# Patient Record
Sex: FEMALE | Race: WHITE | Employment: UNEMPLOYED | URBAN - METROPOLITAN AREA
[De-identification: names, ages, dates, MRNs, and addresses within clinical notes are randomized per-mention and may not be internally consistent; named-entity substitution may affect disease eponyms.]

---

## 2020-11-25 ENCOUNTER — TELEPHONE (OUTPATIENT)
Dept: PHARMACY | Age: 50
End: 2020-11-25

## 2020-11-25 ENCOUNTER — OFFICE VISIT (OUTPATIENT)
Dept: PRIMARY CARE CLINIC | Age: 50
End: 2020-11-25
Payer: COMMERCIAL

## 2020-11-25 VITALS
WEIGHT: 219.4 LBS | SYSTOLIC BLOOD PRESSURE: 106 MMHG | DIASTOLIC BLOOD PRESSURE: 64 MMHG | BODY MASS INDEX: 33.25 KG/M2 | HEIGHT: 68 IN | OXYGEN SATURATION: 99 % | HEART RATE: 85 BPM

## 2020-11-25 PROBLEM — Z86.718 HX OF DEEP VENOUS THROMBOSIS: Status: ACTIVE | Noted: 2020-11-25

## 2020-11-25 PROBLEM — R73.03 PREDIABETES: Status: ACTIVE | Noted: 2020-11-25

## 2020-11-25 PROBLEM — Z95.828 HISTORY OF AORTO-FEMORAL BYPASS: Status: ACTIVE | Noted: 2020-11-25

## 2020-11-25 PROBLEM — I50.9 CHRONIC CONGESTIVE HEART FAILURE (HCC): Status: ACTIVE | Noted: 2020-11-25

## 2020-11-25 PROBLEM — K21.9 GASTROESOPHAGEAL REFLUX DISEASE: Status: ACTIVE | Noted: 2020-11-25

## 2020-11-25 PROBLEM — I25.10 CORONARY ARTERY DISEASE INVOLVING NATIVE CORONARY ARTERY OF NATIVE HEART WITHOUT ANGINA PECTORIS: Status: ACTIVE | Noted: 2020-11-25

## 2020-11-25 PROBLEM — Z95.1 S/P CABG (CORONARY ARTERY BYPASS GRAFT): Status: ACTIVE | Noted: 2020-11-25

## 2020-11-25 PROBLEM — I10 ESSENTIAL HYPERTENSION: Status: ACTIVE | Noted: 2020-11-25

## 2020-11-25 PROCEDURE — 1036F TOBACCO NON-USER: CPT | Performed by: INTERNAL MEDICINE

## 2020-11-25 PROCEDURE — G8427 DOCREV CUR MEDS BY ELIG CLIN: HCPCS | Performed by: INTERNAL MEDICINE

## 2020-11-25 PROCEDURE — G8417 CALC BMI ABV UP PARAM F/U: HCPCS | Performed by: INTERNAL MEDICINE

## 2020-11-25 PROCEDURE — 99204 OFFICE O/P NEW MOD 45 MIN: CPT | Performed by: INTERNAL MEDICINE

## 2020-11-25 PROCEDURE — G8484 FLU IMMUNIZE NO ADMIN: HCPCS | Performed by: INTERNAL MEDICINE

## 2020-11-25 RX ORDER — WARFARIN SODIUM 5 MG/1
5 TABLET ORAL
COMMUNITY
End: 2020-11-25 | Stop reason: SDUPTHER

## 2020-11-25 RX ORDER — METOPROLOL SUCCINATE 25 MG/1
50 TABLET, EXTENDED RELEASE ORAL 2 TIMES DAILY
COMMUNITY
End: 2022-05-03 | Stop reason: SDUPTHER

## 2020-11-25 RX ORDER — POTASSIUM CHLORIDE 1.5 G/1.77G
20 POWDER, FOR SOLUTION ORAL DAILY
COMMUNITY
End: 2022-05-03 | Stop reason: SDUPTHER

## 2020-11-25 RX ORDER — WARFARIN SODIUM 5 MG/1
5 TABLET ORAL DAILY
Qty: 90 TABLET | Refills: 1 | Status: ON HOLD | OUTPATIENT
Start: 2020-11-25 | End: 2021-05-04 | Stop reason: HOSPADM

## 2020-11-25 RX ORDER — CICLOPIROX 7.7 MG/G
GEL TOPICAL
Qty: 1 TUBE | Refills: 1 | Status: SHIPPED | OUTPATIENT
Start: 2020-11-25 | End: 2021-05-03 | Stop reason: ALTCHOICE

## 2020-11-25 RX ORDER — FAMOTIDINE 20 MG/1
20 TABLET, FILM COATED ORAL 2 TIMES DAILY
COMMUNITY
End: 2021-05-03

## 2020-11-25 RX ORDER — LISINOPRIL 10 MG/1
10 TABLET ORAL DAILY
COMMUNITY
End: 2021-02-25 | Stop reason: ALTCHOICE

## 2020-11-25 RX ORDER — MEDROXYPROGESTERONE ACETATE 2.5 MG/1
5 TABLET ORAL DAILY
Status: ON HOLD | COMMUNITY
End: 2021-05-04

## 2020-11-25 RX ORDER — FUROSEMIDE 40 MG/1
40 TABLET ORAL DAILY
COMMUNITY
End: 2022-05-03 | Stop reason: SDUPTHER

## 2020-11-25 RX ORDER — ATORVASTATIN CALCIUM 40 MG/1
40 TABLET, FILM COATED ORAL DAILY
COMMUNITY
End: 2022-05-03 | Stop reason: SDUPTHER

## 2020-11-25 ASSESSMENT — PATIENT HEALTH QUESTIONNAIRE - PHQ9
SUM OF ALL RESPONSES TO PHQ QUESTIONS 1-9: 0
2. FEELING DOWN, DEPRESSED OR HOPELESS: 0
SUM OF ALL RESPONSES TO PHQ QUESTIONS 1-9: 0
SUM OF ALL RESPONSES TO PHQ QUESTIONS 1-9: 0
1. LITTLE INTEREST OR PLEASURE IN DOING THINGS: 0
SUM OF ALL RESPONSES TO PHQ9 QUESTIONS 1 & 2: 0

## 2020-11-25 NOTE — TELEPHONE ENCOUNTER
New Referral  Has been on warfarin since 2005  Has 2.5 mg and 1 mg tablets  Takes 5.5 mg daily  Will bring ID, Insurance card and medication list

## 2020-11-30 ENCOUNTER — TELEPHONE (OUTPATIENT)
Dept: PRIMARY CARE CLINIC | Age: 50
End: 2020-11-30

## 2020-11-30 NOTE — TELEPHONE ENCOUNTER
Patient states the handicap placard that was made for her does not have an expiration date so the [facility] would not accept it.  Please place new order for patient and call her when ready, thank you

## 2020-12-01 ENCOUNTER — TELEPHONE (OUTPATIENT)
Dept: PHARMACY | Age: 50
End: 2020-12-01

## 2020-12-01 NOTE — TELEPHONE ENCOUNTER
Spoke with patient for COVID 19 screening secondary to upcoming appointment tomorrow . At this time patient denies symptoms, recent (previous 14 days) positive covid test, recent travel and exposure. Patient will report to [facility] at scheduled time. Patient educated to call physician or go to ER with respiratory symptoms or fever and to not present to appointment if symptomatic. Will coordinate for next appointment accordingly. Reminded patient to bring insurance card, photo ID, and all current medication bottles for initial visit with us.     Camille Snider, PharmD, 12/1/2020 1:37 PM

## 2020-12-02 ENCOUNTER — HOSPITAL ENCOUNTER (OUTPATIENT)
Dept: PHARMACY | Age: 50
Setting detail: THERAPIES SERIES
Discharge: HOME | End: 2020-12-02
Payer: COMMERCIAL

## 2020-12-02 ENCOUNTER — TELEPHONE (OUTPATIENT)
Dept: PHARMACY | Age: 50
End: 2020-12-02

## 2020-12-02 VITALS — TEMPERATURE: 97.8 F

## 2020-12-02 LAB
INR BLD: 2.7
PROTIME: 32 SECONDS

## 2020-12-02 PROCEDURE — 99212 OFFICE O/P EST SF 10 MIN: CPT

## 2020-12-02 PROCEDURE — 85610 PROTHROMBIN TIME: CPT

## 2020-12-02 RX ORDER — WARFARIN SODIUM 1 MG/1
TABLET ORAL
Qty: 45 TABLET | Refills: 1 | Status: SHIPPED | OUTPATIENT
Start: 2020-12-02 | End: 2022-01-31

## 2020-12-02 NOTE — PROGRESS NOTES
Spoke with Rosita Chan at [address] Patient information given. They will contact patient to set up visit with their clinic. Progress note and referral faxed to [facility].    Jerilyn Maya, Pharm D, [facility]  [facility]  12/2/2020 12:25 PM

## 2020-12-02 NOTE — PROGRESS NOTES
First visit to [facility] Office. Education provided on indication and mechanism of warfarin, compliance, appropriate follow-up & monitoring, dietary and medication interactions, potential thromboembolic & bleeding complications, when to seek medical care, and office policy. Has been on warfarin since 2005 for dvt. Has been taking warfarin 5.5 mg (2.5 mg tablet with 3 of the 1 mg tablets) for at least 2 weeks. Unsure of dosing prior to that as warfarin was provided to her in skilled nursing. Patient also has warfarin 5 mg tablets at home she just picked up from [facility] after appointment with PCP. Patient would prefer to take a 5 mg tablet and 1/2 of the 1 mg tablet. Last INR was 11/16, but unsure of results. Patient is currently living with her elderly mother who drives her to all appointments. Patient is working on getting back on her feet and getting used to the [facility] area. Patient would like to go to the Coumadin Clinic in [facility] due to distance mother is driving her to appointments. Patient provided with a map of the [facility]    Patient states compliant all of the time with regimen. No bleeding or thromboembolic side effects noted. No significant med or dietary changes. Med list reviewed, no changes. Does eat salads, but uses regular head lettuce and understands to stay consistent. No significant recent illness or disease state changes. PT/INR done in office per protocol. INR was therapeutic at 2.7 (goal 2-3). Warfarin regimen will be continued at current dose 5.5 mg daily (1 of the 5 mg tablets with 1/2 of the 1 mg tablet). Will retest in 2 weeks. Patient understands dosing directions and information discussed. Dosing schedule and follow up appointment given to patient. Progress note routed to referring physicians office.     CLINICAL PHARMACY CONSULT: MED RECONCILIATION/REVIEW ADDENDUM    For Pharmacy Admin Tracking Only    PHSO: No  Total # of Interventions Recommended: 1  - Refills Provided #: 1  - Maintenance Safety Lab Monitoring #: 1  Total Interventions Accepted: 1  Time Spent (min): [address], LarryD

## 2020-12-02 NOTE — TELEPHONE ENCOUNTER
Refill for warfarin 1 mg tablet sent to [facility].    Larry Robb D, [facility]  [facility]  12/2/2020 10:28 AM

## 2020-12-05 ASSESSMENT — ENCOUNTER SYMPTOMS
CONSTIPATION: 0
DIARRHEA: 0
BACK PAIN: 0
SINUS PRESSURE: 0
SHORTNESS OF BREATH: 0
VOMITING: 0
WHEEZING: 0
NAUSEA: 0
SINUS PAIN: 0
ABDOMINAL DISTENTION: 0
ABDOMINAL PAIN: 0
COUGH: 0

## 2020-12-05 NOTE — PROGRESS NOTES
[facility] PRIMARY CARE  [facility] [address] [facility] [address]  [address] [address]  Dept: [phone]  Dept Fax: [phone]    Michael Bhandari is a 53 y.o. female who presents today for her medical conditions/complaints as noted below. Chief Complaint   Patient presents with   [facility] Patient     Establish Care. Referral to Cardiology. HPI:     This is a 41-year-old female who is here to establish care. She has past medical history of, coronary disease, history of DVT, essential hypertension, prediabetes, history of aortofemoral bypass, GERD. She is currently on Coumadin for history of DVT and she needs refill on Coumadin and referral to cardiology. She is currently on Lasix, lisinopril, Lipitor, potassium, Toprol. She is also on Provera. No other complaints or concerns.       No results found for: LABA1C          ( goal A1C is < 7)   No results found for: LABMICR  No results found for: LDLCHOLESTEROL, LDLCALC    (goal LDL is <100)   No results found for: AST, ALT, BUN  BP Readings from Last 3 Encounters:   20 106/64          (goal 120/80)    Past Medical History:   Diagnosis Date    CAD (coronary artery disease)     GERD (gastroesophageal reflux disease)     Hyperlipidemia     Hypertension     Prediabetes       Past Surgical History:   Procedure Laterality Date    CORONARY ARTERY BYPASS GRAFT      EYE SURGERY      Eye Socket    SHOULDER SURGERY Right     rods and pins placed    TONSILLECTOMY         Family History   Problem Relation Age of Onset    Heart Disease Father     Colon Cancer Brother        Social History     Tobacco Use    Smoking status: Former Smoker     Packs/day: 1.00     Years: 25.00     Pack years: 25.00     Types: Cigarettes     Last attempt to quit: 2007     Years since quittin.9    Smokeless tobacco: Never Used   Substance Use Topics    Alcohol use: Not Currently      Current Outpatient Medications Medication Sig Dispense Refill    metoprolol succinate (TOPROL XL) 25 MG extended release tablet Take 75 mg by mouth 2 times daily      medroxyPROGESTERone (PROVERA) 2.5 MG tablet Take 5 mg by mouth daily      famotidine (PEPCID) 20 MG tablet Take 20 mg by mouth 2 times daily      furosemide (LASIX) 40 MG tablet Take 40 mg by mouth daily      lisinopril (PRINIVIL;ZESTRIL) 10 MG tablet Take 10 mg by mouth daily      atorvastatin (LIPITOR) 40 MG tablet Take 40 mg by mouth daily      potassium chloride (KLOR-CON) 20 MEQ packet Take 20 mEq by mouth daily      warfarin (COUMADIN) 5 MG tablet Take 1 tablet by mouth daily Indications: Taking 5.5 Mg DAILY 90 tablet 1    Ciclopirox (LOPROX) 0.77 % gel Apply to affected areas twice daily (Patient not taking: Reported on 12/2/2020) 1 Tube 1    warfarin (COUMADIN) 1 MG tablet Take 5.5 mg (1 of the 5 mg tablet + 1/2 of the 1 mg tablet) daily or as directed by Coumadin Clinic. 45 tablet 1    Handicap Placard MISC by Does not apply route For 5 years 1 each 0     No current facility-administered medications for this visit. No Known Allergies    Health Maintenance   Topic Date Due    Potassium monitoring  1971    Creatinine monitoring  1971    A1C test (Diabetic or Prediabetic)  07/03/1981    Lipid screen  07/03/1981    HIV screen  07/03/1986    Cervical cancer screen  07/03/1992    DTaP/Tdap/Td vaccine (1 - Tdap) 11/25/2021 (Originally 7/3/1990)    Flu vaccine  Completed    Hepatitis A vaccine  Aged Out    Hepatitis B vaccine  Aged Out    Hib vaccine  Aged Out    Meningococcal (ACWY) vaccine  Aged Out    Pneumococcal 0-64 years Vaccine  Aged Out       Subjective:      Review of Systems   Constitutional: Negative for activity change, appetite change, chills, fatigue and fever. HENT: Negative for congestion, ear pain, hearing loss, nosebleeds, sinus pressure, sinus pain and sneezing. Eyes: Negative for visual disturbance.    Respiratory: Negative for cough, shortness of breath and wheezing. Cardiovascular: Negative for chest pain and palpitations. Gastrointestinal: Negative for abdominal distention, abdominal pain, constipation, diarrhea, nausea and vomiting. Endocrine: Negative for cold intolerance, heat intolerance, polydipsia, polyphagia and polyuria. Genitourinary: Negative for difficulty urinating, menstrual problem, vaginal bleeding and vaginal discharge. Musculoskeletal: Negative for back pain and joint swelling. Skin: Negative for rash. Neurological: Negative for numbness and headaches. Psychiatric/Behavioral: Negative for sleep disturbance. The patient is not nervous/anxious. All other systems reviewed and are negative. Objective:     Physical Exam  Vitals signs and nursing note reviewed. Constitutional:       General: She is not in acute distress. Appearance: She is well-developed. She is not diaphoretic. HENT:      Head: Normocephalic and atraumatic. Right Ear: Hearing normal.      Left Ear: Hearing normal.      Mouth/Throat:      Pharynx: Uvula midline. Eyes:      General: No scleral icterus. Conjunctiva/sclera: Conjunctivae normal.      Pupils: Pupils are equal, round, and reactive to light. Neck:      Musculoskeletal: Full passive range of motion without pain, normal range of motion and neck supple. Thyroid: No thyroid mass or thyromegaly. Vascular: No JVD. Trachea: Phonation normal.   Cardiovascular:      Rate and Rhythm: Normal rate and regular rhythm. Pulses: No decreased pulses. Carotid pulses are 2+ on the right side and 2+ on the left side. Radial pulses are 2+ on the right side and 2+ on the left side. Heart sounds: Normal heart sounds. No murmur. Pulmonary:      Effort: Pulmonary effort is normal. No accessory muscle usage or respiratory distress. Breath sounds: Normal breath sounds. No wheezing or rales.    Abdominal:      General: Bowel sounds are normal. There is no distension. Palpations: Abdomen is soft. Tenderness: There is no abdominal tenderness. Musculoskeletal: Normal range of motion. General: No deformity. Lymphadenopathy:      Cervical: No cervical adenopathy. Skin:     General: Skin is warm. Capillary Refill: Capillary refill takes less than 2 seconds. Neurological:      Mental Status: She is alert and oriented to person, place, and time. Deep Tendon Reflexes: Reflexes normal.   Psychiatric:         Behavior: Behavior normal.       /64   Pulse 85   Ht 5' 8\" (1.727 m)   Wt 219 lb 6.4 oz (99.5 kg)   SpO2 99% Comment: 2L of O2  BMI 33.36 kg/m²     Assessment:          1. Encounter to establish care with new doctor      2. Chronic congestive heart failure, unspecified heart failure type ([facility] [address]    - Handicap placard  - Armando Chery DO, Cardiology, [place]    3. Gastroesophageal reflux disease, unspecified whether esophagitis present  Pepcid    4. Coronary artery disease involving native coronary artery of native heart without angina pectoris  Toprol, Lipitor    5. Hx of deep venous thrombosis    - warfarin (COUMADIN) 5 MG tablet; Take 1 tablet by mouth daily Indications: Taking 5.5 Mg DAILY  Dispense: 90 tablet; Refill: 1  - [facility]) Medication Mgmt (Anticoagulation) - [address]    6. Essential hypertension  Well-controlled on lisinopril, Toprol    7. Prediabetes  Diet and exercise advised    8. Ringworm of body    - Ciclopirox (LOPROX) 0.77 % gel; Apply to affected areas twice daily (Patient not taking: Reported on 12/2/2020)  Dispense: 1 Tube; Refill: 1    9. History of aorto-femoral bypass  Follow-up with cardiology            Diagnosis Orders   1. Encounter to establish care with new doctor     2. Chronic congestive heart failure, unspecified [facility] failure type ([facility]  Handicap Armando Miranda DO, Cardiology, [place]   3.  Gastroesophageal reflux disease, unspecified whether esophagitis present     4. Coronary artery disease involving native coronary artery of native heart without angina pectoris     5. Hx of deep venous thrombosis  warfarin (COUMADIN) 5 MG tablet    [facility] Medication Mgmt (Anticoagulation) - [facility]   6. Essential hypertension     7. Prediabetes     8. Ringworm of body  Ciclopirox (LOPROX) 0.77 % gel   9. History of aorto-femoral bypass             Plan:      Return in about 3 months (around 2/25/2021) for Routine follow-up. Orders Placed This Encounter   Procedures    Handicap placard    EZEKIEL - Nikhil, [address], DO, Cardiology, [facility]     Referral Priority:   Routine     Referral Type:   Eval and Treat     Referral Reason:   Specialty Services Required     Referred to Provider:   Mitch Simon DO     Requested Specialty:   Cardiology     Number of Visits Requested:   [id number] Located within [facility] Medication Mgmt (Anticoagulation) - [facility]     Referral Priority:   Routine     Referral Type:   Eval and Treat     Referral Reason:   Specialty Services Required     Requested Specialty:   Pharmacist     Number of Visits Requested:   1     Expiration Date:   11/25/2022     Orders Placed This Encounter   Medications    warfarin (COUMADIN) 5 MG tablet     Sig: Take 1 tablet by mouth daily Indications: Taking 5.5 Mg DAILY     Dispense:  90 tablet     Refill:  1    Ciclopirox (LOPROX) 0.77 % gel     Sig: Apply to affected areas twice daily     Dispense:  1 Tube     Refill:  1         Patient given educational materials - see patient instructions. Discussed use, benefit, and side effects of prescribedmedications. All patient questions answered. Pt voiced understanding. Reviewed health maintenance. Instructed to continue current medications, diet and exercise. Patient agreed with treatment plan. Follow up as directed. I spent a total of 45 minutes face to face with this patient. Over 50% of that time was spent on counseling and care coordination.   Please see

## 2020-12-07 ENCOUNTER — TELEPHONE (OUTPATIENT)
Dept: PHARMACY | Age: 50
End: 2020-12-07

## 2020-12-16 ENCOUNTER — HOSPITAL ENCOUNTER (OUTPATIENT)
Dept: PHARMACY | Age: 50
Setting detail: THERAPIES SERIES
Discharge: HOME | End: 2020-12-16
Payer: COMMERCIAL

## 2020-12-16 LAB
INR BLD: 3.9
PROTIME: 46.4 SECONDS

## 2020-12-16 PROCEDURE — 99212 OFFICE O/P EST SF 10 MIN: CPT

## 2020-12-16 PROCEDURE — 85610 PROTHROMBIN TIME: CPT

## 2020-12-16 NOTE — PROGRESS NOTES
Medication Management Service, Warfarin Management  [facility] [phone]  Visit Date: 12/16/2020   Subjective:   Joanna Oneal is a 53 y.o. female who presents to clinic today for anticoagulation monitoring and adjustment. Patient seen in clinic for warfarin management due to  Indication:   DVT. INR goal: of 2.0-3.0. Duration of therapy: indefinite. Assessment and PLAN   PT/INR done in office per protocol. INR today is 3.9, supratherapeutic. Plan:  First time seeing the patient here at the [facility]. Pt reports that she is typically stable on this dose of 5.5 mg daily. Today, she presents supratherapeutic. After discussion, the cause could not be identified. Will hold today's dose then will continue current regimen of warfarin 5.5 mg daily. Using warfarin 1 mg and 5 mg tablets. Recheck INR in one week. Patient seen at [address] [place] [address] Dr. COVID screening complete and temperature recorded - afebrile. Patient verbalized understanding of dosing directions and information discussed. Dosing schedule given to patient. Progress note sent to referring office. Patient acknowledges working in consult agreement with pharmacist as referred by his/her physician.       Electronically signed by Meryle Locks, [address] [facility] on 12/16/20 at 9:00 AM EST    CLINICAL PHARMACY CONSULT: MED RECONCILIATION/REVIEW [address]: No  Total # of Interventions Recommended: 1  - Decreased Dose #: 1  - Maintenance Safety Lab Monitoring #: 1  Total Interventions Accepted: 1  Time Spent (min): [address], [address]

## 2020-12-23 ENCOUNTER — HOSPITAL ENCOUNTER (OUTPATIENT)
Dept: PHARMACY | Age: 50
Setting detail: THERAPIES SERIES
Discharge: HOME | End: 2020-12-23
Payer: COMMERCIAL

## 2020-12-23 LAB
INR BLD: 3.8
PROTIME: 45.9 SECONDS

## 2020-12-23 PROCEDURE — 85610 PROTHROMBIN TIME: CPT

## 2020-12-23 PROCEDURE — 99212 OFFICE O/P EST SF 10 MIN: CPT

## 2020-12-23 NOTE — PROGRESS NOTES
Medication Management Service, Warfarin Management  [facility] [phone]  Visit Date: 12/23/2020   Subjective:   Chaim Robledo is a 53 y.o. female who presents to clinic today for anticoagulation monitoring and adjustment. Patient seen in clinic for warfarin management due to  Indication:   DVT. INR goal: of 2.0-3.0. Duration of therapy: indefinite. Assessment and PLAN   PT/INR done in office per protocol. INR today is 3.8, supratherapeutic. Plan: The patient remains supratherapeutic. Will hold today's 5.5 mg dose then will initiate a new decreased regimen of warfarin 5 mg daily (9.1% decrease). Using warfarin 5 mg tablets. Recheck INR in one week. Patient seen at [address] [place] [address] Dr. COVID screening complete and temperature recorded - afebrile. Patient verbalized understanding of dosing directions and information discussed. Dosing schedule given to patient. Progress note sent to referring office. Patient acknowledges working in consult agreement with pharmacist as referred by his/her physician.       Electronically signed by Lula Petersen, [address] [place] [address] on 12/23/20 at 9:10 AM EST    CLINICAL PHARMACY CONSULT: MED RECONCILIATION/REVIEW Charles  22. Tracking Only    PHSO: No  Total # of Interventions Recommended: 2  - Decreased Dose #: 2  (hold x 1 then decrease regimen)  - Maintenance Safety Lab Monitoring #: 1  Total Interventions Accepted: 2  Time Spent (min): [facility], [address] [place] [facility]

## 2020-12-30 ENCOUNTER — HOSPITAL ENCOUNTER (OUTPATIENT)
Dept: PHARMACY | Age: 50
Setting detail: THERAPIES SERIES
Discharge: HOME | End: 2020-12-30
Payer: COMMERCIAL

## 2020-12-30 LAB
INR BLD: 3.5
PROTIME: 42.5 SECONDS

## 2020-12-30 PROCEDURE — 99212 OFFICE O/P EST SF 10 MIN: CPT

## 2020-12-30 PROCEDURE — 85610 PROTHROMBIN TIME: CPT

## 2020-12-30 NOTE — PROGRESS NOTES
Medication Management Service, Warfarin Management  [facility] Medication Management, [phone]  Visit Date: 12/30/2020   Subjective:   Ronna Cortes is a 53 y.o. female who presents to clinic today for anticoagulation monitoring and adjustment. Patient was referred for warfarin management due to  Indication:   DVT. INR goal: of 2.0-3.0. Duration of therapy: other: undeterminded. Patient reports the following:   Adherent with regimen:  Yes  Missed or extra doses:  None   Bleeding or thromboembolic side effects:  None  Significant medication, dietary, alcohol, or tobacco changes:  None  Significant recent illness, disease state changes, or hospitalization:  None  Upcoming surgeries or procedures:  ECHO on 1/8/21. Assessment and PLAN   PT/INR done in office per protocol. INR today is 3.5, supratherapeutic. The COVID screening was done at the appointment today and the patient's temperature was 97.6 F. Plan:  Instructed the patient to start a decreased warfarin regimen of 2.5 mg Wed and 5 mg on all other days. Using warfarin 1 mg and 5 mg tablets. Recheck INR in 2 week(s). Patient verbalized understanding of dosing directions and information discussed. Dosing schedule given to patient. Progress note sent to referring office. Patient acknowledges working in consult agreement with pharmacist as referred by his/her physician.       Electronically signed by Anna Cox, [facility] on 12/30/20 at 9:19 AM EST    CLINICAL PHARMACY CONSULT: MED RECONCILIATION/REVIEW Charles  22. Tracking Only    PHSO: No  Total # of Interventions Recommended: 1  - Decreased Dose #: 1  - Maintenance Safety Lab Monitoring #: 1  Total Interventions Accepted: 1  Time Spent (min): [facility], PharmD  [address]

## 2021-01-13 ENCOUNTER — HOSPITAL ENCOUNTER (OUTPATIENT)
Dept: PHARMACY | Age: 51
Setting detail: THERAPIES SERIES
Discharge: HOME | End: 2021-01-13
Payer: COMMERCIAL

## 2021-01-13 LAB
INR BLD: 3.8
PROTIME: 45.2 SECONDS

## 2021-01-13 PROCEDURE — 85610 PROTHROMBIN TIME: CPT

## 2021-01-13 PROCEDURE — 99212 OFFICE O/P EST SF 10 MIN: CPT

## 2021-01-13 NOTE — PROGRESS NOTES
Medication Management Service, Warfarin Management  [facility] [phone]  Visit Date: 1/13/2021   Subjective:   Magali Sanders is a 53 y.o. female who presents to clinic today for anticoagulation monitoring and adjustment. Patient seen in clinic for warfarin management due to  Indication:   DVT. INR goal: of 2.0-3.0. Duration of therapy: indefinite. Assessment and PLAN   PT/INR done in office per protocol. INR today is 3.8, supratherapeutic. Plan:  The patient remains supratherapeutic. Will hold today's 2.5 mg dose then further reduce the patient's warfarin maintenance regimen to 2.5 mg on WF and 5 mg all other days of the week. Using warfarin 5 mg tablets. Recheck INR in ~two weeks. Patient seen at [address] [place] [address] Dr. COVID screening complete and temperature recorded - afebrile. Patient verbalized understanding of dosing directions and information discussed. Dosing schedule given to patient. Progress note sent to referring office. Patient acknowledges working in consult agreement with pharmacist as referred by his/her physician.       Electronically signed by Benigno Dyson, [address] [place] [address] on 1/13/21 at 10:09 AM EST    CLINICAL PHARMACY CONSULT: MED RECONCILIATION/REVIEW Charles  22. Tracking Only    PHSO: No  Total # of Interventions Recommended: 2  - Decreased Dose #: 2 - hold x 1; reduce maintenance regimen  - Maintenance Safety Lab Monitoring #: 1  Total Interventions Accepted: 2  Time Spent (min): [address], [address] [place] [address]

## 2021-01-25 ENCOUNTER — HOSPITAL ENCOUNTER (OUTPATIENT)
Dept: PHARMACY | Age: 51
Setting detail: THERAPIES SERIES
Discharge: HOME | End: 2021-01-25
Payer: COMMERCIAL

## 2021-01-25 DIAGNOSIS — Z86.718 HX OF DEEP VENOUS THROMBOSIS: ICD-10-CM

## 2021-01-25 LAB
INR BLD: 2.9
PROTIME: 34.4 SECONDS

## 2021-01-25 PROCEDURE — 85610 PROTHROMBIN TIME: CPT

## 2021-01-25 PROCEDURE — 99211 OFF/OP EST MAY X REQ PHY/QHP: CPT

## 2021-02-08 ENCOUNTER — HOSPITAL ENCOUNTER (OUTPATIENT)
Dept: PHARMACY | Age: 51
Setting detail: THERAPIES SERIES
Discharge: HOME | End: 2021-02-08
Payer: COMMERCIAL

## 2021-02-08 ENCOUNTER — HOSPITAL ENCOUNTER (OUTPATIENT)
Age: 51
Discharge: HOME | End: 2021-02-08
Payer: COMMERCIAL

## 2021-02-08 DIAGNOSIS — Z86.718 HX OF DEEP VENOUS THROMBOSIS: ICD-10-CM

## 2021-02-08 LAB
ANION GAP SERPL CALCULATED.3IONS-SCNC: 7 MMOL/L (ref 9–17)
BUN BLDV-MCNC: 21 MG/DL (ref 6–20)
BUN/CREAT BLD: ABNORMAL (ref 9–20)
CALCIUM SERPL-MCNC: 9.3 MG/DL (ref 8.6–10.4)
CHLORIDE BLD-SCNC: 105 MMOL/L (ref 98–107)
CO2: 29 MMOL/L (ref 20–31)
CREAT SERPL-MCNC: 0.92 MG/DL (ref 0.5–0.9)
GFR AFRICAN AMERICAN: >60 ML/MIN
GFR NON-AFRICAN AMERICAN: >60 ML/MIN
GFR SERPL CREATININE-BSD FRML MDRD: ABNORMAL ML/MIN/{1.73_M2}
GFR SERPL CREATININE-BSD FRML MDRD: ABNORMAL ML/MIN/{1.73_M2}
GLUCOSE BLD-MCNC: 133 MG/DL (ref 70–99)
INR BLD: 2.2
POTASSIUM SERPL-SCNC: 5 MMOL/L (ref 3.7–5.3)
PROTIME: 26.8 SECONDS
SODIUM BLD-SCNC: 141 MMOL/L (ref 135–144)

## 2021-02-08 PROCEDURE — 85610 PROTHROMBIN TIME: CPT

## 2021-02-08 PROCEDURE — 80048 BASIC METABOLIC PNL TOTAL CA: CPT

## 2021-02-08 PROCEDURE — 36415 COLL VENOUS BLD VENIPUNCTURE: CPT

## 2021-02-08 PROCEDURE — 99211 OFF/OP EST MAY X REQ PHY/QHP: CPT

## 2021-02-08 NOTE — PROGRESS NOTES
Medication Management Service, Warfarin Management  [facility] Medication Management, [phone]  Visit Date: 2/8/2021   Subjective:   Marilu Mancuso is a 53 y.o. female who presents to clinic today for anticoagulation monitoring and adjustment. Patient was referred for warfarin management due to  Indication:   DVT. INR goal: of 2.0-3.0. Duration of therapy: other: undetermined. Patient reports the following:   Adherent with regimen:  Yes  Missed or extra doses:  None   Bleeding or thromboembolic side effects:  None  Significant medication, dietary, alcohol, or tobacco changes: The patient has been taking acetaminophen lately, 2 tablets BID, some days she takes 6 tablets daily. Significant recent illness, disease state changes, or hospitalization:  The patient threw out her back last week. Upcoming surgeries or procedures:  None           Assessment and PLAN   PT/INR done in office per protocol. INR today is 2.2, therapeutic. The COVID screening was done at the appointment today and the patient's temperature was 98.6 F. Plan:  Instructed the patient to continue the current warfarin regimen of 2.5 mg Wed, Fri and 5 mg on all other days. Using warfarin 1 mg and 5 mg tablets. Recheck INR in 4 week(s). Patient verbalized understanding of dosing directions and information discussed. Dosing schedule given to patient. Progress note sent to referring office. Patient acknowledges working in consult agreement with pharmacist as referred by his/her physician.       Electronically signed by Greg Beckett [facility] on 2/8/21 at 9:01 AM EST    CLINICAL PHARMACY CONSULT: MED RECONCILIATION/REVIEW Charles  22. Tracking Only    PHSO: No  Total # of Interventions Recommended: 0  - Maintenance Safety Lab Monitoring #: 1  Total Interventions Accepted: 0  Time Spent (min): 1060 [facility], PharmD  [address]

## 2021-02-25 ENCOUNTER — OFFICE VISIT (OUTPATIENT)
Dept: PRIMARY CARE CLINIC | Age: 51
End: 2021-02-25
Payer: COMMERCIAL

## 2021-02-25 VITALS
OXYGEN SATURATION: 99 % | SYSTOLIC BLOOD PRESSURE: 124 MMHG | WEIGHT: 239 LBS | RESPIRATION RATE: 16 BRPM | BODY MASS INDEX: 36.34 KG/M2 | DIASTOLIC BLOOD PRESSURE: 76 MMHG | HEART RATE: 104 BPM

## 2021-02-25 DIAGNOSIS — G89.29 CHRONIC BILATERAL LOW BACK PAIN WITHOUT SCIATICA: ICD-10-CM

## 2021-02-25 DIAGNOSIS — R73.03 PREDIABETES: ICD-10-CM

## 2021-02-25 DIAGNOSIS — I10 ESSENTIAL HYPERTENSION: Primary | ICD-10-CM

## 2021-02-25 DIAGNOSIS — K21.9 GASTROESOPHAGEAL REFLUX DISEASE, UNSPECIFIED WHETHER ESOPHAGITIS PRESENT: ICD-10-CM

## 2021-02-25 DIAGNOSIS — M54.50 CHRONIC BILATERAL LOW BACK PAIN WITHOUT SCIATICA: ICD-10-CM

## 2021-02-25 DIAGNOSIS — Z95.1 S/P CABG (CORONARY ARTERY BYPASS GRAFT): ICD-10-CM

## 2021-02-25 PROCEDURE — 99214 OFFICE O/P EST MOD 30 MIN: CPT | Performed by: INTERNAL MEDICINE

## 2021-02-25 PROCEDURE — G8427 DOCREV CUR MEDS BY ELIG CLIN: HCPCS | Performed by: INTERNAL MEDICINE

## 2021-02-25 PROCEDURE — G8484 FLU IMMUNIZE NO ADMIN: HCPCS | Performed by: INTERNAL MEDICINE

## 2021-02-25 PROCEDURE — 1036F TOBACCO NON-USER: CPT | Performed by: INTERNAL MEDICINE

## 2021-02-25 PROCEDURE — G8417 CALC BMI ABV UP PARAM F/U: HCPCS | Performed by: INTERNAL MEDICINE

## 2021-02-25 RX ORDER — SPIRONOLACTONE 25 MG/1
25 TABLET ORAL DAILY
COMMUNITY
End: 2021-09-01

## 2021-02-25 RX ORDER — SACUBITRIL AND VALSARTAN 24; 26 MG/1; MG/1
1 TABLET, FILM COATED ORAL 2 TIMES DAILY
COMMUNITY

## 2021-02-25 ASSESSMENT — ENCOUNTER SYMPTOMS
ABDOMINAL DISTENTION: 0
CONSTIPATION: 0
WHEEZING: 0
VOMITING: 0
SINUS PRESSURE: 0
COUGH: 0
SINUS PAIN: 0
SHORTNESS OF BREATH: 0
DIARRHEA: 0
NAUSEA: 0
ABDOMINAL PAIN: 0
BACK PAIN: 1

## 2021-02-25 ASSESSMENT — PATIENT HEALTH QUESTIONNAIRE - PHQ9
SUM OF ALL RESPONSES TO PHQ QUESTIONS 1-9: 0
SUM OF ALL RESPONSES TO PHQ9 QUESTIONS 1 & 2: 0
1. LITTLE INTEREST OR PLEASURE IN DOING THINGS: 0

## 2021-02-25 NOTE — PROGRESS NOTES
[facility] PRIMARY CARE  [address] [facility] 15[address]  [address] [address]  Dept: [phone]  Dept Fax: [phone]    Edinson Powers is a 53 y.o. female who presents today for her medical conditions/complaints as noted below. Chief Complaint   Patient presents with    Other     Low back pain       HPI:     This is a 67-year-old female who is here for regular follow-up. Next she has past medical history of essential hypertension, prediabetes, GERD, cardiac disease status post CABG. Reviewed all the medications updated list.  She has been having lower back pain and she has not taken any medications for the same. She does not have any radiation to her bilateral lower extremities.   Blood pressures well controlled      No results found for: LABA1C          ( goal A1C is < 7)   No results found for: LABMICR  No results found for: LDLCHOLESTEROL, LDLCALC    (goal LDL is <100)   BUN (mg/dL)   Date Value   2021 21 (H)     BP Readings from Last 3 Encounters:   21 124/76   20 106/64          (goal 120/80)    Past Medical History:   Diagnosis Date    CAD (coronary artery disease)     GERD (gastroesophageal reflux disease)     Hyperlipidemia     Hypertension     Prediabetes       Past Surgical History:   Procedure Laterality Date    CORONARY ARTERY BYPASS GRAFT  2004    EYE SURGERY  1990    Eye Socket    SHOULDER SURGERY Right 1994    rods and pins placed    TONSILLECTOMY         Family History   Problem Relation Age of Onset    Heart Disease Father     Colon Cancer Brother        Social History     Tobacco Use    Smoking status: Former Smoker     Packs/day: 1.00     Years: 25.00     Pack years: 25.00     Types: Cigarettes     Quit date: 2007     Years since quittin.1    Smokeless tobacco: Never Used   Substance Use Topics    Alcohol use: Not Currently      Current Outpatient Medications   Medication Sig Dispense Refill  sacubitril-valsartan (ENTRESTO) 24-26 MG per tablet Take 1 tablet by mouth 2 times daily      spironolactone (ALDACTONE) 25 MG tablet Take 25 mg by mouth daily      warfarin (COUMADIN) 1 MG tablet Take 5.5 mg (1 of the 5 mg tablet + 1/2 of the 1 mg tablet) daily or as directed by Coumadin Clinic. 45 tablet 1    metoprolol succinate (TOPROL XL) 25 MG extended release tablet Take 75 mg by mouth 2 times daily      medroxyPROGESTERone (PROVERA) 2.5 MG tablet Take 5 mg by mouth daily      furosemide (LASIX) 40 MG tablet Take 40 mg by mouth daily      atorvastatin (LIPITOR) 40 MG tablet Take 40 mg by mouth daily      potassium chloride (KLOR-CON) 20 MEQ packet Take 20 mEq by mouth daily      warfarin (COUMADIN) 5 MG tablet Take 1 tablet by mouth daily Indications: Taking 5.5 Mg DAILY 90 tablet 1    Ciclopirox (LOPROX) 0.77 % gel Apply to affected areas twice daily 1 Tube 1    Handicap Placard MISC by Does not apply route For 5 years 1 each 0    famotidine (PEPCID) 20 MG tablet Take 20 mg by mouth 2 times daily       No current facility-administered medications for this visit. No Known Allergies    Health Maintenance   Topic Date Due    Hepatitis C screen  1970    Pneumococcal 0-64 years Vaccine (1 of 1 - PPSV23) 07/03/1977    A1C test (Diabetic or Prediabetic)  07/03/1981    Lipid screen  07/03/1981    HIV screen  07/03/1986    Cervical cancer screen  07/03/1992    DTaP/Tdap/Td vaccine (1 - Tdap) 11/25/2021 (Originally 7/3/1990)    Potassium monitoring  02/08/2022    Creatinine monitoring  02/08/2022    Flu vaccine  Completed    Hepatitis A vaccine  Aged Out    Hepatitis B vaccine  Aged Out    Hib vaccine  Aged Out    Meningococcal (ACWY) vaccine  Aged Out       Subjective:      Review of Systems   Constitutional: Negative for activity change, appetite change, chills, fatigue and fever. Effort: Pulmonary effort is normal. No accessory muscle usage or respiratory distress. Breath sounds: Normal breath sounds. No wheezing or rales. Abdominal:      General: Bowel sounds are normal. There is no distension. Palpations: Abdomen is soft. Tenderness: There is no abdominal tenderness. Musculoskeletal: Normal range of motion. General: No deformity. Lymphadenopathy:      Cervical: No cervical adenopathy. Skin:     General: Skin is warm. Capillary Refill: Capillary refill takes less than 2 seconds. Findings: No rash. Neurological:      Mental Status: She is alert and oriented to person, place, and time. Deep Tendon Reflexes: Reflexes normal.   Psychiatric:         Behavior: Behavior normal.       /76   Pulse 104   Resp 16   Wt 239 lb (108.4 kg)   SpO2 99%   BMI 36.34 kg/m²     Assessment:          1. Essential hypertension  Well-controlled    2. Prediabetes      3. S/P CABG (coronary artery bypass graft)  On Entresto and metoprolol for congestive heart failure    4. Gastroesophageal reflux disease, unspecified whether esophagitis present  On Pepcid    5. Chronic bilateral low back pain without sciatica    - [facility]  [place]            Diagnosis Orders   1. Essential hypertension     2. Prediabetes     3. S/P CABG (coronary artery bypass graft)     4. Gastroesophageal reflux disease, unspecified whether esophagitis present     5.  Chronic bilateral low back pain without sciatica  [facility]  [place]           Plan:      Return in about 2 weeks (around 3/11/2021) for annual exam.    Orders Placed This Encounter   Procedures    [facility]     Referral Priority:   Routine     Referral Type:   Eval and Treat     Referral Reason:   Specialty Services Required     Requested Specialty:   Physical Therapy     Number of Visits Requested:   1 No orders of the defined types were placed in this encounter. Patient given educational materials - see patient instructions. Discussed use, benefit, and side effects of prescribedmedications. All patient questions answered. Pt voiced understanding. Reviewed health maintenance. Instructed to continue current medications, diet and exercise. Patient agreed with treatment plan. Follow up as directed. I spent a total of 25 minutes face to face with this patient. Over 50% of that time was spent on counseling and care coordination. Please see assessment and plan for details. Electronically signed by Alex Bowers MD on 2/25/2021 at 2:46 PM      Please note that this chart was generated using voice recognition Dragon dictation software. Although every effort was made to ensure the accuracy of this automatedtranscription, some errors in transcription may have occurred.

## 2021-02-26 ENCOUNTER — HOSPITAL ENCOUNTER (OUTPATIENT)
Dept: PHYSICAL THERAPY | Facility: CLINIC | Age: 51
Setting detail: THERAPIES SERIES
Discharge: HOME | End: 2021-02-26
Payer: COMMERCIAL

## 2021-02-26 PROCEDURE — 97162 PT EVAL MOD COMPLEX 30 MIN: CPT

## 2021-02-26 PROCEDURE — 97140 MANUAL THERAPY 1/> REGIONS: CPT

## 2021-02-26 PROCEDURE — 97161 PT EVAL LOW COMPLEX 20 MIN: CPT

## 2021-02-26 NOTE — CONSULTS
[] [facility]) [facility]STEP [facility] &  Therapy  [address]  [phone]  F: [phone] [] [address]  [address]   [address]  P: [phone]  F: [phone] [] [address] &  Therapy  [address]  P: [phone]  F: [phone] [] [address]  P: [phone]  F: [phone] [] [address]  [facility]   [address]   [place]: [phone]  F: [phone]      Physical Therapy Spine Evaluation    Date:  2021  Patient: Sheeba Soto  : 1970  MRN: [id number]  Physician: Dr. Echavarria Elder: [facility] Diagnosis: LBP   Rehab Codes: M54.5  Onset Date: 21  Next 's appt. : 3/11    Subjective:   CC: LBP (central mostly) but increased on R if she bends  HPI: approximately 1 month ago, she woke up and had increased LBP. Pain is still there, but not as severe. Walking increases her pain. Changed beds. PMHx: [] Unremarkable [] Diabetes [] HTN  [] Pacemaker   [] MI/Heart Problems [] Cancer [] Arthritis  [x] Other: arterial bypass.   15% ejection fraction so activity level is low, toes will turn purple, significant issues with blood clots            [x] Refer to full medical chart  In EPIC       Comorbidities:   [x] Obesity [] Dialysis  [] N/A   [x] Asthma/COPD [] Dementia [x] Other: CABG '03   [] Stroke [] Sleep apnea [] Other:   [x] Vascular disease [] Rheumatic disease [] Other:     Tests: [] X-Ray: [] MRI:  [] Other:no testing    Medications: [x] Refer to full medical record [] None [] Other:  Allergies:      [x] Refer to full medical record [] None [] Other:    Function:  Hand Dominance  [] Right  [] Left  Patient lives with: Her mom   In what type of home []  One story   [] Two story   [] Split level   Number of stairs to enter    With handrail on the []  Right to enter   [] Left to enter   Bathroom has a []  Tub only  [] Tub/shower combo   [] Walk in shower    []  Grab bars   Washing machine is on []  Main level   [] Second level   [] Basement   Employer    Job Status []  Normal duty   [] Light duty   [] Off due to condition    []  Retired   [x] Not employed   [] Disability  [] Other:  []  Return to work:    Work activities/duties Daily light chores, quilting, trying to start quilting       ADL/IADL Previous level of function Current level of function Who currently assists the patient with task   Bathing  [] Independent  [] Assist [x] Independent  [] Assist    Dress/grooming [] Independent  [] Assist [x] Independent  [] Assist    Transfer/mobility [] Independent  [] Assist [x] Independent  [] Assist    Feeding [] Independent  [] Assist [x] Independent  [] Assist    Toileting [] Independent  [] Assist [x] Independent  [] Assist    Driving [] Independent  [] Assist [x] Independent  [] Assist    Housekeeping [] Independent  [] Assist [x] Independent  [] Assist    Grocery shop/meal prep [] Independent  [] Assist [x] Independent  [] Assist      Gait Prior level of function Current level of function    [] Independent  [] Assist [x] Independent  [] Assist   Device: [] Independent [x] Independent    [] Straight Cane [] Quad cane [] Straight Cane [] Quad cane    [] Standard walker [] Rolling walker   [] 4 wheeled walker [] Standard walker [] Rolling walker   [] 4 wheeled walker    [] Wheelchair [] Wheelchair     Pain:  [x] Yes  [] No Location: LB Pain Rating: (0-10 scale) 1/10 now; worst 6/10 this morning   Pain altered Tx:  [] Yes  [x] No  Action:    Symptoms:  [x] Improving [] Worsening [] Same  Better:  [] AM    [] PM    [] Sit    [] Rise/Sit    []Stand    [] Walk    [] Lying    [] Other:  Worse: [] AM    [] PM    [] Sit    [] Rise/Sit    []Stand    [x] Walk    [] Lying    [x] Bend                      [] without cues. LTG: (to be met in 20treatments)  1. Pain 0/10 majority of the time, 2/10 at the worst  2. Modified Oswestry <10% interference as dictated by cardiologist   3. No pain in standing with ext, flexion and arielle SB    Patient goals:\"to get rid of the back pain\"    Rehab Potential:  [] Good  [x] Fair  [] Poor   Suggested Professional Referral:  [x] No  [] Yes:  Barriers to Goal Achievement:  [x] No  [] Yes:  Domestic Concerns:  [x] No  [] Yes:    Pt. Education:  [x] Plans/Goals, Risks/Benefits discussed  [] Home exercise program  Method of Education: [x] Verbal  [x] Demo  [] Written  Comprehension of Education:  [] Verbalizes understanding. [] Demonstrates understanding. [x] Needs Review. [] Demonstrates/verbalizes understanding of HEP/Ed previously given.     Treatment Plan:  [x] Therapeutic Exercise   71293  [] Iontophoresis: 4 mg/mL Dexamethasone Sodium Phosphate  mAmin  91432   [] Therapeutic Activity  63399 [] Vasopneumatic cold with compression  75667    [] Gait Training   78035 [] Ultrasound   47619   [] Neuromuscular Re-education  25021 [] Electrical Stimulation Unattended  64552   [x] Manual Therapy  11388 [] Electrical Stimulation Attended  71397   [x] Instruction in HEP  [] Lumbar/Cervical Traction  85389   [x] Aquatic Therapy-pending cardiologist approval   71953 [] Cold/hotpack    [] Massage   60560      [] Dry Needling, 1 or 2 muscles  35003   [] Biofeedback, first 15 minutes   28675  [] Biofeedback, additional 15 minutes   33599 [] Dry Needling, 3 or more muscles  31559       Frequency:  2 x/week for 20 visits    Todays Treatment:  Modalities: none  Precautions: SHE HAS 15% EJECTION FRACTION SO EXERTION LEVEL NEEDS TO BE LOW, FREQUENT BREAKS, NO CARDIO, SOB IS HER FIRST SYMPTOM  Exercises:  Exercise Reps/ Time Weight/ Level Issued for HEP completed Comments   Supine        Glut sets 10x10\"  X (verbally) x    Bridges  pain                       Other:  Manual  1.  DI to proximal arielle iliacus; not to psoas to not to occlude psoas      Specific Instructions for next treatment:      Evaluation Complexity:  History (Personal factors, comorbidities) [] 0 [x] 1-2 [] 3+   Exam (limitations, restrictions) [] 1-2 [x] 3 [] 4+   Clinical presentation (progression) [] Stable [x] Evolving  [] Unstable   Decision Making [] Low [x] Moderate [] High    [] Low Complexity [x] Moderate Complexity [] High Complexity       Treatment Charges: Mins Units   [x] Evaluation       []  Low       [x]  Moderate       []  High  1   []  Modalities     []  Ther Exercise     [x]  Manual Therapy 8 1   []  Ther Activities     []  Aquatics     []  Vasocompression     []  Other       TOTAL TREATMENT TIME: 45    Time in: 1000      Time out: 1050    Electronically signed by: Anna Marie Mendoza PT        Physician Signature:________________________________Date:__________________  By signing above or cosigning this note, I have reviewed this plan of care and certify a need for medically necessary rehabilitation services.      *PLEASE SIGN ABOVE AND FAX BACK ALL PAGES*

## 2021-03-01 ENCOUNTER — HOSPITAL ENCOUNTER (OUTPATIENT)
Dept: PHYSICAL THERAPY | Facility: CLINIC | Age: 51
Setting detail: THERAPIES SERIES
Discharge: HOME | End: 2021-03-01
Payer: COMMERCIAL

## 2021-03-01 PROCEDURE — 97110 THERAPEUTIC EXERCISES: CPT

## 2021-03-01 PROCEDURE — 97140 MANUAL THERAPY 1/> REGIONS: CPT

## 2021-03-01 NOTE — FLOWSHEET NOTE
[] [facility]) - [facility] &  Therapy  [address]  [phone]  F: [phone] [] [address]  [place] [address]   [address]  P: [phone]  F: [phone] [] [address] &  Therapy  [address]  P: [phone]  F: [phone] [] [address]  P: [phone]  F: [phone] [] [address]  [facility]   [address]   [place]: [phone]  F: [phone]      Physical Therapy Daily Treatment Note    Date:  3/1/2021  Patient Name:  Liza Gaines    :  1970  MRN: [id number]  Physician: Dr. Frankey Pin: [employer]  Medical Diagnosis: LBP                   Rehab Codes: M54.5  Onset Date: 21               Next 's appt. : 3/11  Visit# / total visits:      Cancels/No Shows: 0    Subjective:    Pain:  [x] Yes  [] No Location: LB Pain Rating: (0-10 scale) 1/10  Pain altered Tx:  [] No  [] Yes  Action:  Comments: States she isn't feeling too bad today. Has most of her pain in the morning when she wakes up. Has not yet tried sleeping with a pillow between her knees but plans on trying it tonight.      Objective:  Modalities: none  Precautions: SHE HAS 15% EJECTION FRACTION SO EXERTION LEVEL NEEDS TO BE LOW, FREQUENT BREAKS, NO CARDIO, SOB IS HER FIRST SYMPTOM  Exercises:  Exercise Reps/ Time Weight/ Level Issued for HEP completed Comments   Supine             LTR 10x10\"  x x    SKTC 5x10\"  x x    Piriformis Stretch 5x10\"  x x    Hip Flexor Stretch 1' ea   x    Glut sets 10x10\"   x x     Bridges  pain           TA activation 10x5\"    x x     TA activation with march x20   x x     Other:  Manual  1.  DI to proximal arielle iliacus; not to psoas to not to occlude psoas       Specific Instructions for next treatment:      Treatment Charges: Mins Units   []  Modalities     [x]  Ther Exercise 30 2   [x]  Manual Therapy 10 1   []  Ther Activities     []  Aquatics     []  Vasocompression     []  Other     Total Treatment time 40 3       Assessment: [x] Progressing toward goals. Stretches and ex completed as noted in log above. Focused on keeping all ex gentle to avoid excessive exertion and SOB. Good carry over to pt. Tightness noted in B proximal iliacus R>L. Cues with TA activation to avoid holding breath. Issued HEP of ex noted above. [] No change. [] Other:  [x] Patient would continue to benefit from skilled physical therapy services in order to: meet goals listed below    STG: (to be met in 10 treatments)  1. ? Pain: <4/10 at the worst  2. ? ROM: 90% of standing flexion and extension  3. ? Strength: able to do 1 squat correctly for sit to stand transfers  4. ? Function:<22% interference with modified Oswestry  5. Patient to be independent with home exercise program as demonstrated by performance with correct form without cues.     LTG: (to be met in 20treatments)  1. Pain 0/10 majority of the time, 2/10 at the worst  2. Modified Oswestry <10% interference as dictated by cardiologist   3. No pain in standing with ext, flexion and arielle SB     Patient goals:\"to get rid of the back pain\"    Pt. Education:  [x] Yes  [] No  [] Reviewed Prior HEP/Ed  Method of Education: [x] Verbal  [] Demo  [x] Written  Comprehension of Education:  [x] Verbalizes understanding. [] Demonstrates understanding. [] Needs review. [] Demonstrates/verbalizes HEP/Ed previously given. Plan: [x] Continue current frequency toward long and short term goals.     [] Specific Instructions for subsequent treatments:       Time In: 2:30pm           Time Out: 3:15 pm    Electronically signed by:  Benjamin Hrenandez PTA

## 2021-03-04 ENCOUNTER — HOSPITAL ENCOUNTER (OUTPATIENT)
Dept: PHYSICAL THERAPY | Facility: CLINIC | Age: 51
Setting detail: THERAPIES SERIES
Discharge: HOME | End: 2021-03-04
Payer: COMMERCIAL

## 2021-03-04 PROCEDURE — 97140 MANUAL THERAPY 1/> REGIONS: CPT

## 2021-03-04 PROCEDURE — 97110 THERAPEUTIC EXERCISES: CPT

## 2021-03-04 NOTE — FLOWSHEET NOTE
[] [address] [facility] &  Therapy  [address]  [phone]  F: [phone] [] [address]  [employer] [address]   [address]  P: [phone]  F: [phone] [] [address] &  Therapy  [address] [facility] [address]  P: [phone]  F: [phone] [] [address]  P: [phone]  F: [phone] [] [address]  [facility]   [address]   [place]: [phone]  F: [phone]      Physical Therapy Daily Treatment Note    Date:  3/4/2021  Patient Name:  Carter Sewell   :  1970  MRN: [id number]  Physician: Dr. Brandt Peter: [facility] Diagnosis: LBP                    Rehab Codes: M54.5  Onset Date: 21                Next 's appt. : 3/11  Visit# / total visits: 3/20   Cancels/No Shows: 0    Subjective:    Pain:  [x] Yes  [] No Location: LB Pain Rating: (0-10 scale) 1/10   Pain altered Tx:  [] No  [] Yes  Action:  Comments:    Pt reported they felt \" pretty tired\" after first tx session. Noted pain increased to 3/10 with movement but subsided quickly. Stated they had been performing their HEP regularly.     Objective:  Modalities: none  Precautions: SHE HAS 15% EJECTION FRACTION SO EXERTION LEVEL NEEDS TO BE LOW, FREQUENT BREAKS, NO CARDIO, SOB IS HER FIRST SYMPTOM  Exercises:  Exercise Reps/ Time Weight/ Level Issued for HEP completed Comments   Supine             LTR 10x10\"  x x    SKTC 5x10\"  x x    Piriformis Stretch 5x10\"  x x    Hip Flexor Stretch 1' ea   x    Glut sets 10x10\"   x x     Bridges          Attempted but had pain   TA activation 10x5\"    x x     TA activation with march 2x10   x x     Other:  Manual  1.  DI to proximal arielle iliacus; not to psoas to not to occlude psoas        Specific Instructions for next treatment: Add bike as cardiologist called to state \"no restrictions\"  However, inform pt to notify staff if she feels she is exerting herself too much. Add TG squats, lungroxanne, yvonne 3 way hip with TB      Treatment Charges: Mins Units   []  Modalities     [x]  Ther Exercise 30 2   [x]  Manual Therapy 10 1   []  Ther Activities     []  Aquatics     []  Vasocompression     []  Other     Total Treatment time 40 3       Assessment: [x] Progressing toward goals. Began with manual to arielle hips which consisted of DI to iliacus. Stretches and ex completed as noted per log above. Attempted bridges, but did not complete due to increased pain. Initiated UE raises with TA activation, however, pt stated these were too easy, So attempted alt UE/LE raises with TA activation. Pt stated these were too hard and stated they were \"ready to be done for the day\". Multiple breaks were given to avoid SOB. All ex was completed on this date with fair abdullahi. Pt fatigued quickly, noting that they were \"worn out\" after tx session, however, felt no increase in pain. Reminded pt to perform HEP regularly. [] No change. [] Other:  [x] Patient would continue to benefit from skilled physical therapy services in order to: meet goals listed below    STG: (to be met in 10 treatments)  1. ? Pain: <4/10 at the worst  2. ? ROM: 90% of standing flexion and extension  3. ? Strength: able to do 1 squat correctly for sit to stand transfers  4. ? Function:<22% interference with modified Oswestry  5. Patient to be independent with home exercise program as demonstrated by performance with correct form without cues.     LTG: (to be met in 20treatments)  1. Pain 0/10 majority of the time, 2/10 at the worst  2. Modified Oswestry <10% interference as dictated by cardiologist   3. No pain in standing with ext, flexion and arielle SB     Patient goals:\"to get rid of the back pain\"    Pt.  Education:  [x] Yes  [] No  [] Reviewed Prior HEP/Ed

## 2021-03-08 ENCOUNTER — HOSPITAL ENCOUNTER (OUTPATIENT)
Dept: PHYSICAL THERAPY | Facility: CLINIC | Age: 51
Setting detail: THERAPIES SERIES
Discharge: HOME | End: 2021-03-08
Payer: COMMERCIAL

## 2021-03-08 ENCOUNTER — HOSPITAL ENCOUNTER (OUTPATIENT)
Dept: PHARMACY | Age: 51
Setting detail: THERAPIES SERIES
Discharge: HOME | End: 2021-03-08
Payer: COMMERCIAL

## 2021-03-08 DIAGNOSIS — Z86.718 HX OF DEEP VENOUS THROMBOSIS: ICD-10-CM

## 2021-03-08 LAB
INR BLD: 3.3
PROTIME: 40 SECONDS

## 2021-03-08 PROCEDURE — 99212 OFFICE O/P EST SF 10 MIN: CPT

## 2021-03-08 PROCEDURE — 85610 PROTHROMBIN TIME: CPT

## 2021-03-08 PROCEDURE — 97110 THERAPEUTIC EXERCISES: CPT

## 2021-03-08 NOTE — PROGRESS NOTES
Medication Management Service, Warfarin Management  [facility] Medication Management, [phone]  Visit Date: 3/8/2021   Subjective:   Marilu Mancuso is a 53 y.o. female who presents to clinic today for anticoagulation monitoring and adjustment. Patient was referred for warfarin management due to  Indication:   DVT. INR goal: of 2.0-3.0. Duration of therapy: other: undetermined. Patient reports the following:   Adherent with regimen:  Yes  Missed or extra doses:  None   Bleeding or thromboembolic side effects:  None  Significant medication, dietary, alcohol, or tobacco changes:  None  Significant recent illness, disease state changes, or hospitalization:  The patient does report she does physical therapy now for her back. Upcoming surgeries or procedures:  None           Assessment and PLAN   PT/INR done in office per protocol. INR today is 3.3, supratherapeutic. The COVID screening was done at the appointment today and the patient's temperature was 98.1 F. Plan:  Instructed the patient to take a decreased dose of 2.5 mg today, then continue with the current warfarin regimen of 2.5 mg Wed, Fri and 5 mg on all other days. Using warfarin 1 mg and 5 mg tablets. Recheck INR in 2 week(s). Patient verbalized understanding of dosing directions and information discussed. Dosing schedule given to patient. Progress note sent to referring office. Patient acknowledges working in consult agreement with pharmacist as referred by his/her physician.       Electronically signed by Greg Beckett, [facility] on 3/8/21 at 8:59 AM EST    CLINICAL PHARMACY CONSULT: MED RECONCILIATION/REVIEW Charles  22. Tracking Only    PHSO: No  Total # of Interventions Recommended: 1  - Decreased Dose #: 1  - Maintenance Safety Lab Monitoring #: 1  Total Interventions Accepted: 1  Time Spent (min): 6707 [facility], PharmD  [address]

## 2021-03-08 NOTE — FLOWSHEET NOTE
[] [facility] TWELVESTEP [facility] &  Therapy  [address]  [phone]  F: [phone] [] [address]  [place] [address]   [address]  P: [phone]  F: [phone] [] [place]  [address]  P: [phone]  F: [phone] [] [address]  P: [phone]  F: [phone] [] [address]  [facility]   [address]   [place]: [phone]  F: [phone]      Physical Therapy Daily Treatment Note    Date:  3/8/2021  Patient Name:  Kalie Prakash   :  1970  MRN: [id number]  Physician: Dr. Esdras Spencer: [facility] Diagnosis: LBP                    Rehab Codes: M54.5  Onset Date: 21                Next 's appt. : 3/11  Visit# / total visits:    Cancels/No Shows: 0    Subjective: pt arrived reporting soreness just overall. States no pain this date.     Pain:  [x] Yes  [] No Location: LB Pain Rating: (0-10 scale) 0/10   Pain altered Tx:  [] No  [] Yes  Action:  Comments:    Pt     Objective:  Modalities: none  Precautions: SHE HAS 15% EJECTION FRACTION SO EXERTION LEVEL NEEDS TO BE LOW, FREQUENT BREAKS,  SOB IS HER FIRST SYMPTOM  Exercises:  Exercise Reps/ Time Weight/ Level Issued for HEP completed Comments   nustep 8' L1  x            TG squats 2x10 L14  x    lunges 10x   x    marches 10x   x    3 way hip  10x    next           Supine             LTR 10x10\"  x x    SKTC 5x10\"  x x    Piriformis Stretch 3x20\"  x x    Hip Flexor Stretch 1' ea   x    Supine HS stretch 3x20\"   x    Glut sets 10x10\"   x x     Bridges          Attempted but had pain   TA activation 10x5\"    x      TA activation with march 2x10   x      Other:    Manual  1.  DI to proximal arielle iliacus; not to psoas to not to occlude femoral artery        Specific Instructions for next treatment:        Treatment Charges: Mins Units   []  Modalities     [x]  Ther Exercise 40 3   [x]  Manual Therapy     []  Ther Activities     []  Aquatics     []  Vasocompression     []  Other     Total Treatment time 40 3       Assessment: [x] Progressing toward goals. Able to progress pt with adding in nustep for warmup, supine HS stretch, TG squats/HR, lunges, and marches with good tolerance. Pt noting slight pull in HS with nustep this date with stretching help decrease pull. Pt unable to complete 3 way hip this date due to pt having increased fatigue with progressions. Issued new exercises for HEP with good understanding noted. Plan to add in 3 way hip next visit. [] No change. [] Other:  [x] Patient would continue to benefit from skilled physical therapy services in order to: meet goals listed below    STG: (to be met in 10 treatments)  1. ? Pain: <4/10 at the worst  2. ? ROM: 90% of standing flexion and extension  3. ? Strength: able to do 1 squat correctly for sit to stand transfers  4. ? Function:<22% interference with modified Oswestry  5. Patient to be independent with home exercise program as demonstrated by performance with correct form without cues.     LTG: (to be met in 20treatments)  1. Pain 0/10 majority of the time, 2/10 at the worst  2. Modified Oswestry <10% interference as dictated by cardiologist   3. No pain in standing with ext, flexion and arielle SB     Patient goals:\"to get rid of the back pain\"    Pt. Education:  [x] Yes  [] No  [] Reviewed Prior HEP/Ed  Method of Education: [x] Verbal  [] Demo  [x] Written  Comprehension of Education:  [x] Verbalizes understanding. [] Demonstrates understanding. [] Needs review. [] Demonstrates/verbalizes HEP/Ed previously given. Plan: [x] Continue current frequency toward long and short term goals.     [] Specific Instructions for subsequent treatments:       Time In: 9:57 am Time Out: 10:45 am    Electronically signed by:  Vijay Harrison PTA

## 2021-03-10 ENCOUNTER — HOSPITAL ENCOUNTER (OUTPATIENT)
Dept: PHYSICAL THERAPY | Facility: CLINIC | Age: 51
Setting detail: THERAPIES SERIES
Discharge: HOME | End: 2021-03-10
Payer: COMMERCIAL

## 2021-03-10 PROCEDURE — 97140 MANUAL THERAPY 1/> REGIONS: CPT

## 2021-03-10 PROCEDURE — 97110 THERAPEUTIC EXERCISES: CPT

## 2021-03-10 NOTE — FLOWSHEET NOTE
[] [facility]  Therapy  [address]  [phone]  F: [phone] [] [address]  [place] [address]   [address]  P: [phone]  F: [phone] [x] [address] &  Therapy  [address]  P: [phone]  F: [phone] [] [address]  P: [phone]  F: [phone] [] [address]  [facility]   [address]   [place]: [phone]  F: [phone]      Physical Therapy Daily Treatment Note    Date:  3/10/2021  Patient Name:  Edinson Powers   :  1970  MRN: [id number]  Physician: Dr. Tessie Louie: [facility] Diagnosis: LBP                    Rehab Codes: M54.5  Onset Date: 21                Next 's appt. : 3/11  Visit# / total visits:    Cancels/No Shows: 0    Subjective: Pt arrives to PT mentioning her LB has been feeling much better. States she thinks her couch at home is giving her problems from being too soft. She feels good when she wakes up in the morning and her back stiffens up through the day.     Pain:  [x] Yes  [] No Location: LB Pain Rating: (0-10 scale) 0/10   Pain altered Tx:  [] No  [] Yes  Action:  Comments:         Objective:  Modalities: none  Precautions: SHE HAS 15% EJECTION FRACTION SO EXERTION LEVEL NEEDS TO BE LOW, FREQUENT BREAKS,  SOB IS HER FIRST SYMPTOM  Exercises:  Exercise Reps/ Time Weight/ Level Issued for HEP completed Comments   nustep 8' L1  x            TG squats 2x10 L14  x    lunges 10x   x    marches 10x   x    3 way hip  10x orange  x            Supine             LTR 10x10\"  x x    SKTC 5x10\"  x x    Piriformis Stretch 3x20\"  x x    Hip Flexor Stretch 1' ea   x    Supine HS stretch 3x20\"   x    Glut sets 10x10\"   x x     Bridges  5x     x Painful, but tolerable to complete 5   TA activation 10x5\"    x      TA activation with march 2x10   x      Other:    Manual  1.  DI to proximal arielle iliacus; not to psoas to not to occlude femoral artery        Specific Instructions for next treatment:        Treatment Charges: Mins Units   []  Modalities     [x]  Ther Exercise 35 2   [x]  Manual Therapy 10 1   []  Ther Activities     []  Aquatics     []  Vasocompression     []  Other     Total Treatment time 45 3       Assessment: [x] Progressing toward goals. Initiated pt with nustep for warmup followed by DI to B iliacus, more tenderness noted on R side. Progressed pt by adding 3 way hip and had pain with bridges but able to tolerate 5 reps before stopping. Pt requiring a rest break after completing mat stretches and exs and two more while completing standing exs. Pt mentions increased fatigue following exs d/t progressions. Monitor symptoms and continue to progress as tolerated. [] No change. [] Other:  [x] Patient would continue to benefit from skilled physical therapy services in order to: meet goals listed below    STG: (to be met in 10 treatments)  1. ? Pain: <4/10 at the worst  2. ? ROM: 90% of standing flexion and extension  3. ? Strength: able to do 1 squat correctly for sit to stand transfers  4. ? Function:<22% interference with modified Oswestry  5. Patient to be independent with home exercise program as demonstrated by performance with correct form without cues.     LTG: (to be met in 20treatments)  1. Pain 0/10 majority of the time, 2/10 at the worst  2. Modified Oswestry <10% interference as dictated by cardiologist   3. No pain in standing with ext, flexion and arielle SB     Patient goals:\"to get rid of the back pain\"    Pt. Education:  [x] Yes  [] No  [] Reviewed Prior HEP/Ed  Method of Education: [x] Verbal  [] Demo  [x] Written  Comprehension of Education:  [x] Verbalizes understanding. [] Demonstrates understanding. [] Needs review. [] Demonstrates/verbalizes HEP/Ed previously given. Plan: [x] Continue current frequency toward long and short term goals. [] Specific Instructions for subsequent treatments:       Time In: 10:00 am           Time Out: 10:50 am    Electronically signed by:   CALLY Castro  The documentation above was reviewed and accepted by supervising Clinical Instructor Rhonda Martin PTA

## 2021-03-11 ENCOUNTER — HOSPITAL ENCOUNTER (OUTPATIENT)
Age: 51
Setting detail: SPECIMEN
Discharge: HOME | End: 2021-03-11
Payer: COMMERCIAL

## 2021-03-11 ENCOUNTER — OFFICE VISIT (OUTPATIENT)
Dept: PRIMARY CARE CLINIC | Age: 51
End: 2021-03-11
Payer: COMMERCIAL

## 2021-03-11 VITALS
HEIGHT: 65 IN | OXYGEN SATURATION: 97 % | TEMPERATURE: 97 F | DIASTOLIC BLOOD PRESSURE: 74 MMHG | WEIGHT: 240.4 LBS | HEART RATE: 82 BPM | SYSTOLIC BLOOD PRESSURE: 130 MMHG | RESPIRATION RATE: 14 BRPM | BODY MASS INDEX: 40.05 KG/M2

## 2021-03-11 DIAGNOSIS — Z00.00 ANNUAL PHYSICAL EXAM: Primary | ICD-10-CM

## 2021-03-11 DIAGNOSIS — Z00.00 ANNUAL PHYSICAL EXAM: ICD-10-CM

## 2021-03-11 DIAGNOSIS — R73.03 PREDIABETES: ICD-10-CM

## 2021-03-11 DIAGNOSIS — I50.9 CHRONIC CONGESTIVE HEART FAILURE, UNSPECIFIED HEART FAILURE TYPE (HCC): ICD-10-CM

## 2021-03-11 LAB
ABSOLUTE EOS #: 0.26 K/UL (ref 0–0.44)
ABSOLUTE IMMATURE GRANULOCYTE: <0.03 K/UL (ref 0–0.3)
ABSOLUTE LYMPH #: 2.21 K/UL (ref 1.1–3.7)
ABSOLUTE MONO #: 0.66 K/UL (ref 0.1–1.2)
ALBUMIN SERPL-MCNC: 4 G/DL (ref 3.5–5.2)
ALBUMIN/GLOBULIN RATIO: 1.3 (ref 1–2.5)
ALP BLD-CCNC: 105 U/L (ref 35–104)
ALT SERPL-CCNC: 16 U/L (ref 5–33)
ANION GAP SERPL CALCULATED.3IONS-SCNC: 11 MMOL/L (ref 9–17)
AST SERPL-CCNC: 18 U/L
BASOPHILS # BLD: 1 % (ref 0–2)
BASOPHILS ABSOLUTE: 0.06 K/UL (ref 0–0.2)
BILIRUB SERPL-MCNC: 0.61 MG/DL (ref 0.3–1.2)
BUN BLDV-MCNC: 15 MG/DL (ref 6–20)
BUN/CREAT BLD: ABNORMAL (ref 9–20)
CALCIUM SERPL-MCNC: 9.2 MG/DL (ref 8.6–10.4)
CHLORIDE BLD-SCNC: 103 MMOL/L (ref 98–107)
CHOLESTEROL/HDL RATIO: 5.6
CHOLESTEROL: 208 MG/DL
CO2: 26 MMOL/L (ref 20–31)
CREAT SERPL-MCNC: 0.8 MG/DL (ref 0.5–0.9)
DIFFERENTIAL TYPE: NORMAL
EOSINOPHILS RELATIVE PERCENT: 4 % (ref 1–4)
ESTIMATED AVERAGE GLUCOSE: 137 MG/DL
FOLATE: 10 NG/ML
GFR AFRICAN AMERICAN: >60 ML/MIN
GFR NON-AFRICAN AMERICAN: >60 ML/MIN
GFR SERPL CREATININE-BSD FRML MDRD: ABNORMAL ML/MIN/{1.73_M2}
GFR SERPL CREATININE-BSD FRML MDRD: ABNORMAL ML/MIN/{1.73_M2}
GLUCOSE BLD-MCNC: 92 MG/DL (ref 70–99)
HBA1C MFR BLD: 6.4 % (ref 4–6)
HCT VFR BLD CALC: 46.6 % (ref 36.3–47.1)
HDLC SERPL-MCNC: 37 MG/DL
HEMOGLOBIN: 15 G/DL (ref 11.9–15.1)
IMMATURE GRANULOCYTES: 0 %
LDL CHOLESTEROL: 154 MG/DL (ref 0–130)
LYMPHOCYTES # BLD: 31 % (ref 24–43)
MCH RBC QN AUTO: 30.5 PG (ref 25.2–33.5)
MCHC RBC AUTO-ENTMCNC: 32.2 G/DL (ref 28.4–34.8)
MCV RBC AUTO: 94.7 FL (ref 82.6–102.9)
MONOCYTES # BLD: 9 % (ref 3–12)
NRBC AUTOMATED: 0 PER 100 WBC
PDW BLD-RTO: 12.4 % (ref 11.8–14.4)
PLATELET # BLD: 277 K/UL (ref 138–453)
PLATELET ESTIMATE: NORMAL
PMV BLD AUTO: 10.9 FL (ref 8.1–13.5)
POTASSIUM SERPL-SCNC: 4.6 MMOL/L (ref 3.7–5.3)
RBC # BLD: 4.92 M/UL (ref 3.95–5.11)
RBC # BLD: NORMAL 10*6/UL
SEG NEUTROPHILS: 55 % (ref 36–65)
SEGMENTED NEUTROPHILS ABSOLUTE COUNT: 3.97 K/UL (ref 1.5–8.1)
SODIUM BLD-SCNC: 140 MMOL/L (ref 135–144)
TOTAL PROTEIN: 7 G/DL (ref 6.4–8.3)
TRIGL SERPL-MCNC: 85 MG/DL
TSH SERPL DL<=0.05 MIU/L-ACNC: 1.25 MIU/L (ref 0.3–5)
VITAMIN B-12: 387 PG/ML (ref 232–1245)
VITAMIN D 25-HYDROXY: 16.1 NG/ML (ref 30–100)
VLDLC SERPL CALC-MCNC: ABNORMAL MG/DL (ref 1–30)
WBC # BLD: 7.2 K/UL (ref 3.5–11.3)
WBC # BLD: NORMAL 10*3/UL

## 2021-03-11 PROCEDURE — G8484 FLU IMMUNIZE NO ADMIN: HCPCS | Performed by: INTERNAL MEDICINE

## 2021-03-11 PROCEDURE — 99396 PREV VISIT EST AGE 40-64: CPT | Performed by: INTERNAL MEDICINE

## 2021-03-11 ASSESSMENT — ENCOUNTER SYMPTOMS
CONSTIPATION: 0
ABDOMINAL PAIN: 0
VOMITING: 0
ABDOMINAL DISTENTION: 0
SHORTNESS OF BREATH: 0
COUGH: 0
SINUS PAIN: 0
NAUSEA: 0
DIARRHEA: 0
WHEEZING: 0
BACK PAIN: 0
SINUS PRESSURE: 0

## 2021-03-11 NOTE — PROGRESS NOTES
[address] \[facility] [place] [facility]\"" PRIMARY CARE  [facility] [address] [facility] [address]  [address] [address]  Dept: [phone]  Dept Fax: [phone]    Sharon Salinas is a 53 y.o. female who presents today for her medical conditions/complaints as noted below. Chief Complaint   Patient presents with    Annual Exam     routine exam.        HPI:     Is a 60-year-old female who is here for annual physical.  She is due for blood work no other complaints or concerns. She is currently fasting.       No results found for: LABA1C          ( goal A1C is < 7)   No results found for: LABMICR  No results found for: LDLCHOLESTEROL, LDLCALC    (goal LDL is <100)   BUN (mg/dL)   Date Value   2021 21 (H)     BP Readings from Last 3 Encounters:   21 130/74   21 124/76   20 106/64          (goal 120/80)    Past Medical History:   Diagnosis Date    CAD (coronary artery disease)     GERD (gastroesophageal reflux disease)     Hyperlipidemia     Hypertension     Prediabetes       Past Surgical History:   Procedure Laterality Date    CORONARY ARTERY BYPASS GRAFT     [facility] [address]    Eye Socket    SHOULDER SURGERY Right 1994    rods and pins placed    TONSILLECTOMY         Family History   Problem Relation Age of Onset    Heart Disease Father     Colon Cancer Brother        Social History     Tobacco Use    Smoking status: Former Smoker     Packs/day: 1.00     Years: 25.00     Pack years: 25.00     Types: Cigarettes     Quit date: 2007     Years since quittin.2    Smokeless tobacco: Never Used   Substance Use Topics    Alcohol use: Not Currently      Current Outpatient Medications   Medication Sig Dispense Refill    sacubitril-valsartan (ENTRESTO) 24-26 MG per tablet Take 1 tablet by mouth 2 times daily      spironolactone (ALDACTONE) 25 MG tablet Take 25 mg by mouth daily      warfarin (COUMADIN) 1 MG tablet Take 5.5 mg (1 of the 5 mg tablet + 1/2 of the 1 mg tablet) daily or as directed by Coumadin Clinic. 45 tablet 1    Handicap Placard MISC by Does not apply route For 5 years 1 each 0    metoprolol succinate (TOPROL XL) 25 MG extended release tablet Take 75 mg by mouth 2 times daily      medroxyPROGESTERone (PROVERA) 2.5 MG tablet Take 5 mg by mouth daily      famotidine (PEPCID) 20 MG tablet Take 20 mg by mouth 2 times daily      furosemide (LASIX) 40 MG tablet Take 40 mg by mouth daily      atorvastatin (LIPITOR) 40 MG tablet Take 40 mg by mouth daily      potassium chloride (KLOR-CON) 20 MEQ packet Take 20 mEq by mouth daily      warfarin (COUMADIN) 5 MG tablet Take 1 tablet by mouth daily Indications: Taking 5.5 Mg DAILY 90 tablet 1    Ciclopirox (LOPROX) 0.77 % gel Apply to affected areas twice daily 1 Tube 1     No current facility-administered medications for this visit. No Known Allergies    Health Maintenance   Topic Date Due    Hepatitis C screen  Never done    Pneumococcal 0-64 years Vaccine (1 of 1 - PPSV23) Never done    A1C test (Diabetic or Prediabetic)  Never done    Lipid screen  Never done    HIV screen  Never done    Cervical cancer screen  Never done    DTaP/Tdap/Td vaccine (1 - Tdap) 11/25/2021 (Originally 7/3/1990)    Potassium monitoring  02/08/2022    Creatinine monitoring  02/08/2022    Flu vaccine  Completed    Hepatitis A vaccine  Aged Out    Hepatitis B vaccine  Aged Out    Hib vaccine  Aged Out    Meningococcal (ACWY) vaccine  Aged Out       Subjective:      Review of Systems   Constitutional: Negative for activity change, appetite change, chills, fatigue and fever. HENT: Negative for congestion, ear pain, hearing loss, nosebleeds, sinus pressure, sinus pain and sneezing. Eyes: Negative for visual disturbance. Respiratory: Negative for cough, shortness of breath and wheezing. Cardiovascular: Negative for chest pain and palpitations.    Gastrointestinal: Negative for abdominal distention, abdominal pain, constipation, diarrhea, nausea and vomiting. Endocrine: Negative for cold intolerance, heat intolerance, polydipsia, polyphagia and polyuria. Genitourinary: Negative for difficulty urinating, menstrual problem, vaginal bleeding and vaginal discharge. Musculoskeletal: Negative for back pain and joint swelling. Skin: Negative for rash. Neurological: Negative for numbness and headaches. Psychiatric/Behavioral: Negative for sleep disturbance. The patient is not nervous/anxious. All other systems reviewed and are negative. Objective:     Physical Exam  Vitals signs and nursing note reviewed. Constitutional:       General: She is not in acute distress. Appearance: She is well-developed. She is not diaphoretic. HENT:      Head: Normocephalic and atraumatic. Right Ear: Hearing normal.      Left Ear: Hearing normal.      Mouth/Throat:      Pharynx: Uvula midline. Eyes:      General: No scleral icterus. Conjunctiva/sclera: Conjunctivae normal.      Pupils: Pupils are equal, round, and reactive to light. Neck:      Musculoskeletal: Full passive range of motion without pain, normal range of motion and neck supple. Thyroid: No thyroid mass or thyromegaly. Vascular: No JVD. Trachea: Phonation normal.   Cardiovascular:      Rate and Rhythm: Normal rate and regular rhythm. Pulses: No decreased pulses. Carotid pulses are 2+ on the right side and 2+ on the left side. Radial pulses are 2+ on the right side and 2+ on the left side. Heart sounds: Normal heart sounds. No murmur. Pulmonary:      Effort: Pulmonary effort is normal. No accessory muscle usage or respiratory distress. Breath sounds: Normal breath sounds. No wheezing or rales. Abdominal:      General: Bowel sounds are normal. There is no distension. Palpations: Abdomen is soft. Tenderness: There is no abdominal tenderness. Musculoskeletal: Normal range of motion. General: No deformity. Lymphadenopathy:      Cervical: No cervical adenopathy. Skin:     General: Skin is warm. Capillary Refill: Capillary refill takes less than 2 seconds. Findings: No rash. Neurological:      Mental Status: She is alert and oriented to person, place, and time. Deep Tendon Reflexes: Reflexes normal.   Psychiatric:         Behavior: Behavior normal.       /74 (Site: Left Upper Arm, Position: Sitting, Cuff Size: Medium Adult)   Pulse 82   Temp 97 °F (36.1 °C) (Temporal)   Resp 14   Ht 5' 5\" (1.651 m)   Wt 240 lb 6.4 oz (109 kg)   LMP  (Exact Date)   SpO2 97%   BMI 40.00 kg/m²     Assessment:          1. Annual physical exam    - CBC Auto Differential; Future  - TSH with Reflex; Future  - Lipid Panel; Future  - Comprehensive Metabolic Panel; Future  - Vitamin B12 & Folate; Future  - Vitamin D 25 Hydroxy; Future  - Hemoglobin A1C; Future    2. Prediabetes    - Hemoglobin A1C; Future    3. Chronic congestive heart failure, unspecified heart failure type (HCC)    - TSH with Reflex; Future  - Lipid Panel; Future  - Vitamin D 25 Hydroxy; Future            Diagnosis Orders   1. Annual physical exam  CBC Auto Differential    TSH with Reflex    Lipid Panel    Comprehensive Metabolic Panel    Vitamin B12 & Folate    Vitamin D 25 Hydroxy    Hemoglobin A1C   2. Prediabetes  Hemoglobin A1C   3. Chronic congestive heart failure, unspecified heart failure type (HCC)  TSH with Reflex    Lipid Panel    Vitamin D 25 Hydroxy           Plan:      No follow-ups on file. Orders Placed This Encounter   Procedures    CBC Auto Differential     Standing Status:   Future     Standing Expiration Date:   3/11/2022    TSH with Reflex     Standing Status:   Future     Standing Expiration Date:   3/11/2022    Lipid Panel     Standing Status:   Future     Standing Expiration Date:   6/11/2021     Order Specific Question:   Is Patient Fasting?/# of Hours     Answer:    Fast 8-10 hours    Comprehensive Metabolic Panel     Standing Status:   Future     Standing Expiration Date:   3/11/2022    Vitamin B12 & Folate     Standing Status:   Future     Standing Expiration Date:   3/11/2022    Vitamin D 25 Hydroxy     Standing Status:   Future     Standing Expiration Date:   3/11/2022    Hemoglobin A1C     Standing Status:   Future     Standing Expiration Date:   3/11/2022     No orders of the defined types were placed in this encounter. Patient given educational materials - see patient instructions. Discussed use, benefit, and side effects of prescribedmedications. All patient questions answered. Pt voiced understanding. Reviewed health maintenance. Instructed to continue current medications, diet and exercise. Patient agreed with treatment plan. Follow up as directed. I spent a total of 30 minutes face to face with this patient. Over 50% of that time was spent on counseling and care coordination. Please see assessment and plan for details. Electronically signed by Jaimee Mcqueen MD on 3/11/2021 at 10:22 AM      Please note that this chart was generated using voice recognition Dragon dictation software. Although every effort was made to ensure the accuracy of this automatedtranscription, some errors in transcription may have occurred.

## 2021-03-12 RX ORDER — ERGOCALCIFEROL 1.25 MG/1
50000 CAPSULE ORAL WEEKLY
Qty: 12 CAPSULE | Refills: 1 | Status: SHIPPED | OUTPATIENT
Start: 2021-03-12 | End: 2021-08-12 | Stop reason: SDUPTHER

## 2021-03-12 RX ORDER — EMPAGLIFLOZIN 10 MG/1
10 TABLET, FILM COATED ORAL DAILY
Qty: 30 TABLET | Refills: 1 | Status: SHIPPED | OUTPATIENT
Start: 2021-03-12 | End: 2021-05-18

## 2021-03-15 ENCOUNTER — TELEPHONE (OUTPATIENT)
Dept: PRIMARY CARE CLINIC | Age: 51
End: 2021-03-15

## 2021-03-15 ENCOUNTER — HOSPITAL ENCOUNTER (OUTPATIENT)
Dept: PHYSICAL THERAPY | Facility: CLINIC | Age: 51
Setting detail: THERAPIES SERIES
Discharge: HOME | End: 2021-03-15
Payer: COMMERCIAL

## 2021-03-15 PROCEDURE — 97140 MANUAL THERAPY 1/> REGIONS: CPT

## 2021-03-15 PROCEDURE — 97110 THERAPEUTIC EXERCISES: CPT

## 2021-03-15 NOTE — FLOWSHEET NOTE
[] [facility]) - [facility] TWELVESTEP [facility] &  Therapy  [address]  [phone]  F: [phone] [] [address]  [place] [address]   [address]  P: [phone]  F: [phone] [x] [address] &  Therapy  [address]  P: [phone]  F: [phone] [] [address]  P: [phone]  F: [phone] [] [address]  [facility]   [address]   [place]: [phone]  F: [phone]      Physical Therapy Daily Treatment Note    Date:  3/15/2021  Patient Name:  Dolores Ferreira   :  1970  MRN: [id number]  Physician: Dr. Juanita Finnegant: [facility] Diagnosis: LBP                    Rehab Codes: M54.5  Onset Date: 21                Next 's appt. : 3/11  Visit# / total visits:    Cancels/No Shows: 0    Subjective:   Pain:  [x] Yes  [] No Location: LB Pain Rating: (0-10 scale) 1/10   Pain altered Tx:  [] No  [] Yes  Action:  Comments: Pt states very minimal pain coming in today. Has follow up with cardiologist on Wednesday. States she has felt very tired lately, but always feels better after leaving PT.      Objective:  Modalities: none  Precautions: SHE HAS 15% EJECTION FRACTION SO EXERTION LEVEL NEEDS TO BE LOW, FREQUENT BREAKS,  SOB IS HER FIRST SYMPTOM  Exercises:  Exercise Reps/ Time Weight/ Level Issued for HEP completed Comments   nustep 8' L1  x            TG squats 2x10 L14  x    lunges 10x   x    marches 10x   x    3 way hip  10x orange  x            Supine             LTR 10x10\"  x x    SKTC 5x10\"  x x    Piriformis Stretch 3x20\"  x x    Hip Flexor Stretch 1' ea   x    Supine HS stretch 3x20\"       Glut sets 10x10\"   x      Bridges  2x5   x x    TA activation 10x5\"    x x     TA activation with march 2x10   x x   Clamshells x10  x x    Other:    Manual  1.  DI to proximal arielle iliacus; not to psoas to not to occlude femoral artery        Specific Instructions for next treatment:        Treatment Charges: Mins Units   []  Modalities     [x]  Ther Exercise 35 2   [x]  Manual Therapy 10 1   []  Ther Activities     []  Aquatics     []  Vasocompression     []  Other     Total Treatment time 45 3       Assessment: [x] Progressing toward goals. Continued with Nustep warm up followed by standing ex. Needed a few standing rest breaks to complete d/t fatigue. Continues to have tenderness in hip flexors R>L but overall less TTP since starting PT. Able to complete 2 sets of bridges today, needs cues to avoid holding breath. Added clamshells to program with good tolerance. Given handout to add clamshells and bridges to HEP. [] No change. [] Other:  [x] Patient would continue to benefit from skilled physical therapy services in order to: meet goals listed below    STG: (to be met in 10 treatments)  1. ? Pain: <4/10 at the worst  2. ? ROM: 90% of standing flexion and extension  3. ? Strength: able to do 1 squat correctly for sit to stand transfers  4. ? Function:<22% interference with modified Oswestry  5. Patient to be independent with home exercise program as demonstrated by performance with correct form without cues.     LTG: (to be met in 20treatments)  1. Pain 0/10 majority of the time, 2/10 at the worst  2. Modified Oswestry <10% interference as dictated by cardiologist   3. No pain in standing with ext, flexion and arielle SB     Patient goals:\"to get rid of the back pain\"    Pt. Education:  [x] Yes  [] No  [x] Reviewed Prior HEP/Ed  Method of Education: [x] Verbal  [] Demo  [x] Written  Comprehension of Education:  [x] Verbalizes understanding. [] Demonstrates understanding. [] Needs review. [] Demonstrates/verbalizes HEP/Ed previously given. Plan: [x] Continue current frequency toward long and short term goals.     [] Specific Instructions for subsequent treatments:       Time In: 9:55 am         Time Out: 10:52 am    Electronically signed by:  Babs Lefort, PTA

## 2021-03-18 ENCOUNTER — HOSPITAL ENCOUNTER (OUTPATIENT)
Dept: PHYSICAL THERAPY | Facility: CLINIC | Age: 51
Setting detail: THERAPIES SERIES
Discharge: HOME | End: 2021-03-18
Payer: COMMERCIAL

## 2021-03-18 PROCEDURE — 97110 THERAPEUTIC EXERCISES: CPT

## 2021-03-18 PROCEDURE — 97140 MANUAL THERAPY 1/> REGIONS: CPT

## 2021-03-18 NOTE — FLOWSHEET NOTE
[] [facility]) - [facility] TWELVESTEP [facility] &  Therapy  [address]  [phone]  F: [phone] [] [address]  [address]   [address]  P: [phone]  F: [phone] [x] [address] &  Therapy  [address]  P: [phone]  F: [phone] [] [address]  P: [phone]  F: [phone] [] [address]  [facility]   [address]   [place]: [phone]  F: [phone]      Physical Therapy Daily Treatment/Discharge Note    Date:  3/18/2021  Patient Name:  Edinson Powers   :  1970  MRN: [id number]  Physician: Dr. Tessie Louie: [facility] Diagnosis: LBP                    Rehab Codes: M54.5  Onset Date: 21                Next 's appt. : 3/11  Visit# / total visits:    Cancels/No Shows: 0    Subjective:   Pain:  [x] Yes  [] No Location: [facility] Pain Rating: (0-10 scale) 1/10   Pain altered Tx:  [] No  [] Yes  Action:  Comments: Pt states she is feeling well this morning. Has been feeling less pain at night before bed which has felt better. Does mention possibly being done with PT at this time d/t upcoming procedures related to her heart.      Objective:  Modalities: none  Precautions: SHE HAS 15% EJECTION FRACTION SO EXERTION LEVEL NEEDS TO BE LOW, FREQUENT BREAKS,  SOB IS HER FIRST SYMPTOM  Exercises:  Exercise Reps/ Time Weight/ Level Issued for HEP completed Comments   nustep 8' L1  x            TG squats 2x10 L16  x    lunges 10x   x    marches 15x   x    3 way hip  10x orange  x            Supine             LTR 10x10\"  x x    SKTC 5x10\"  x x    Piriformis Stretch 3x20\"  x x    Hip Flexor Stretch 1' ea   x    Supine HS stretch 3x20\"       Glut sets 10x10\"   x      Bridges  2x5   x x    TA activation 10x5\"    x x     TA activation with march 2x10   x x     Clamshells x10  x x    Other:    Manual  1.  DI to proximal arielle iliacus; not to psoas to not to occlude femoral artery        Specific Instructions for next treatment:        Treatment Charges: Mins Units   []  Modalities     [x]  Ther Exercise 35 2   [x]  Manual Therapy 10 1   []  Ther Activities     []  Aquatics     []  Vasocompression     []  Other     Total Treatment time 45 3       Assessment: [x] Progressing toward goals. Good tolerance to therex and stretches completed this date. Pt still requiring intermittent rest breaks but does feel endurance has improved with starting PT. Good recall demonstrated. Less tenderness into bilat hip flexors. Educated pt on continuing with stretches daily and strengthening ex multiple times a week to continue to progress, pt verbalizes understanding. Pt to be d/c at this time. [] No change. [] Other:  [x] Patient would continue to benefit from skilled physical therapy services in order to: meet goals listed below    STG: (to be met in 10 treatments)  1. ? Pain: <4/10 at the worst  2. ? ROM: 90% of standing flexion and extension  3. ? Strength: able to do 1 squat correctly for sit to stand transfers  4. ? Function:<22% interference with modified Oswestry  5. Patient to be independent with home exercise program as demonstrated by performance with correct form without cues.     LTG: (to be met in 20treatments)  1. Pain 0/10 majority of the time, 2/10 at the worst  2. Modified Oswestry <10% interference as dictated by cardiologist   3. No pain in standing with ext, flexion and arielle SB     Patient goals:\"to get rid of the back pain\"    Pt. Education:  [x] Yes  [] No  [x] Reviewed Prior HEP/Ed  Method of Education: [x] Verbal  [] Demo  [x] Written  Comprehension of Education:  [x] Verbalizes understanding. [] Demonstrates understanding. [] Needs review. [] Demonstrates/verbalizes HEP/Ed previously given.      Plan: [] Continue current frequency toward long and short term goals.     [x] DC to HEP at this time      Time In: 10:00 am         Time Out: 10:50 am    Electronically signed by:  Lily Villalba PTA

## 2021-03-22 ENCOUNTER — HOSPITAL ENCOUNTER (OUTPATIENT)
Dept: PHARMACY | Age: 51
Setting detail: THERAPIES SERIES
Discharge: HOME | End: 2021-03-22
Payer: COMMERCIAL

## 2021-03-22 LAB
INR BLD: 3.8
PROTIME: 45.7 SECONDS

## 2021-03-22 PROCEDURE — 85610 PROTHROMBIN TIME: CPT

## 2021-03-22 PROCEDURE — 99212 OFFICE O/P EST SF 10 MIN: CPT

## 2021-03-22 NOTE — PROGRESS NOTES
Medication Management Service, Warfarin Management  [facility] [phone]  Visit Date: 3/22/2021   Subjective:   Kalie Prakash is a 53 y.o. female who presents to clinic today for anticoagulation monitoring and adjustment. Patient seen in clinic for warfarin management due to  Indication:   DVT. INR goal: of 2.0-3.0. Duration of therapy: indefinite. Assessment and PLAN   PT/INR done in office per protocol. INR today is 3.8, supratherapeutic. Plan:  The patient remains supratherapeutic. Advised the patient to hold her dose today then to start a decreased regimen of warfarin 2.5 mg on MWF and 5 mg all other days of the week (8.3% decrease). Using warfarin 5 mg tablets. Recheck INR in two weeks. Patient seen at [address] [place] [address] Dr. COVID screening complete and temperature recorded - afebrile. Patient verbalized understanding of dosing directions and information discussed. Dosing schedule given to patient. Progress note sent to referring office. Patient acknowledges working in consult agreement with pharmacist as referred by his/her physician.       Electronically signed by CAR Liz [facility] on 3/22/21 at 9:10 AM EDT    CLINICAL PHARMACY CONSULT: MED RECONCILIATION/REVIEW hCarles  22. Tracking Only    PHSO: No  Total # of Interventions Recommended: 2  - Decreased Dose #: 2  - Maintenance Safety Lab Monitoring #: 1  Total Interventions Accepted: 2  Time Spent (min): [address], [facility]

## 2021-04-05 ENCOUNTER — HOSPITAL ENCOUNTER (OUTPATIENT)
Dept: PHARMACY | Age: 51
Setting detail: THERAPIES SERIES
Discharge: HOME | End: 2021-04-05
Payer: COMMERCIAL

## 2021-04-05 DIAGNOSIS — Z86.718 HX OF DEEP VENOUS THROMBOSIS: ICD-10-CM

## 2021-04-05 LAB
INR BLD: 2.4
PROTIME: 29.3 SECONDS

## 2021-04-05 PROCEDURE — 99211 OFF/OP EST MAY X REQ PHY/QHP: CPT

## 2021-04-05 PROCEDURE — 85610 PROTHROMBIN TIME: CPT

## 2021-04-26 ENCOUNTER — ANTI-COAG VISIT (OUTPATIENT)
Dept: PHARMACY | Age: 51
End: 2021-04-26

## 2021-04-26 ENCOUNTER — TELEPHONE (OUTPATIENT)
Dept: PRIMARY CARE CLINIC | Age: 51
End: 2021-04-26

## 2021-04-26 DIAGNOSIS — Z86.718 HX OF DEEP VENOUS THROMBOSIS: ICD-10-CM

## 2021-04-26 NOTE — PROGRESS NOTES
Princess with [facility] left a VM 4/26/21 regarding an upcoming procedure. The patient will have an ICD implantation and will need bridging while off warfarin. Will need to determine how many days the patient is to stop warfarin, current weight, and pharmacy to call in the [facility]. The next appointment will need to be rescheduled as well. I attempted to call the patient, but the call rang to a busy tone and then disconnected.

## 2021-04-28 ENCOUNTER — TELEPHONE (OUTPATIENT)
Dept: PHARMACY | Age: 51
End: 2021-04-28

## 2021-04-28 NOTE — TELEPHONE ENCOUNTER
Spoke to the patient regarding a Lovenox bridge for her upcoming procedure on 5/3/21. At first, the patient was hesitant due to her fear of needles and having to inject herself, but since has agreed to treatment as she will have the help of her niece. Instructions for Coumadin hold and Lovenox bridge were explained to the patient. The patient verbalized understanding. Expressed to call with any questions. Lovenox rx called into the [facility]. Thank you. Will continue to follow.    Bello Araujo, LarryD

## 2021-05-03 ENCOUNTER — HOSPITAL ENCOUNTER (OUTPATIENT)
Dept: GENERAL RADIOLOGY | Age: 51
Discharge: HOME | End: 2021-05-05
Payer: COMMERCIAL

## 2021-05-03 ENCOUNTER — APPOINTMENT (OUTPATIENT)
Dept: PHARMACY | Age: 51
End: 2021-05-03
Payer: COMMERCIAL

## 2021-05-03 ENCOUNTER — HOSPITAL ENCOUNTER (OUTPATIENT)
Dept: CARDIAC CATH/INVASIVE PROCEDURES | Age: 51
Discharge: HOME | End: 2021-05-03
Payer: COMMERCIAL

## 2021-05-03 ENCOUNTER — HOSPITAL ENCOUNTER (OUTPATIENT)
Age: 51
Setting detail: OBSERVATION
Discharge: HOME | End: 2021-05-04
Attending: EMERGENCY MEDICINE | Admitting: INTERNAL MEDICINE
Payer: COMMERCIAL

## 2021-05-03 VITALS
WEIGHT: 244 LBS | TEMPERATURE: 98.1 F | DIASTOLIC BLOOD PRESSURE: 70 MMHG | RESPIRATION RATE: 22 BRPM | SYSTOLIC BLOOD PRESSURE: 99 MMHG | OXYGEN SATURATION: 97 % | HEART RATE: 77 BPM | BODY MASS INDEX: 36.98 KG/M2 | HEIGHT: 68 IN

## 2021-05-03 DIAGNOSIS — T82.118A AICD MALFUNCTION, INITIAL ENCOUNTER: Primary | ICD-10-CM

## 2021-05-03 DIAGNOSIS — Z95.810 S/P INTERNAL CARDIAC DEFIBRILLATOR PROCEDURE: ICD-10-CM

## 2021-05-03 LAB
GFR NON-AFRICAN AMERICAN: 55 ML/MIN
GFR SERPL CREATININE-BSD FRML MDRD: >60 ML/MIN
GFR SERPL CREATININE-BSD FRML MDRD: ABNORMAL ML/MIN/{1.73_M2}
GLUCOSE BLD-MCNC: 109 MG/DL (ref 74–100)
HCG, PREGNANCY URINE (POC): NEGATIVE
POC BUN: 21 MG/DL (ref 8–26)
POC CHLORIDE: 106 MMOL/L (ref 98–107)
POC CREATININE: 1.06 MG/DL (ref 0.51–1.19)
POC HEMATOCRIT: 49 % (ref 36–46)
POC HEMOGLOBIN: 16.8 G/DL (ref 12–16)
POC INR: 1
POC POTASSIUM: 4.3 MMOL/L (ref 3.5–4.5)
POC SODIUM: 141 MMOL/L (ref 138–146)
PROTHROMBIN TIME, POC: 11.8 SEC (ref 10.4–14.2)

## 2021-05-03 PROCEDURE — C1751 CATH, INF, PER/CENT/MIDLINE: HCPCS

## 2021-05-03 PROCEDURE — 81025 URINE PREGNANCY TEST: CPT

## 2021-05-03 PROCEDURE — C1894 INTRO/SHEATH, NON-LASER: HCPCS

## 2021-05-03 PROCEDURE — 6360000004 HC RX CONTRAST MEDICATION

## 2021-05-03 PROCEDURE — 82947 ASSAY GLUCOSE BLOOD QUANT: CPT

## 2021-05-03 PROCEDURE — 33225 L VENTRIC PACING LEAD ADD-ON: CPT | Performed by: INTERNAL MEDICINE

## 2021-05-03 PROCEDURE — 2709999900 HC NON-CHARGEABLE SUPPLY

## 2021-05-03 PROCEDURE — 6360000002 HC RX W HCPCS

## 2021-05-03 PROCEDURE — 2500000003 HC RX 250 WO HCPCS

## 2021-05-03 PROCEDURE — 7100000001 HC PACU RECOVERY - ADDTL 15 MIN

## 2021-05-03 PROCEDURE — 82565 ASSAY OF CREATININE: CPT

## 2021-05-03 PROCEDURE — 6370000000 HC RX 637 (ALT 250 FOR IP)

## 2021-05-03 PROCEDURE — C1898 LEAD, PMKR, OTHER THAN TRANS: HCPCS

## 2021-05-03 PROCEDURE — C1722 AICD, SINGLE CHAMBER: HCPCS

## 2021-05-03 PROCEDURE — 85014 HEMATOCRIT: CPT

## 2021-05-03 PROCEDURE — 33208 INSRT HEART PM ATRIAL & VENT: CPT

## 2021-05-03 PROCEDURE — 99283 EMERGENCY DEPT VISIT LOW MDM: CPT

## 2021-05-03 PROCEDURE — 82435 ASSAY OF BLOOD CHLORIDE: CPT

## 2021-05-03 PROCEDURE — 84520 ASSAY OF UREA NITROGEN: CPT

## 2021-05-03 PROCEDURE — 33249 INSJ/RPLCMT DEFIB W/LEAD(S): CPT | Performed by: INTERNAL MEDICINE

## 2021-05-03 PROCEDURE — C1777 LEAD, AICD, ENDO SINGLE COIL: HCPCS

## 2021-05-03 PROCEDURE — 71045 X-RAY EXAM CHEST 1 VIEW: CPT

## 2021-05-03 PROCEDURE — 84132 ASSAY OF SERUM POTASSIUM: CPT

## 2021-05-03 PROCEDURE — 2580000003 HC RX 258

## 2021-05-03 PROCEDURE — 7100000000 HC PACU RECOVERY - FIRST 15 MIN

## 2021-05-03 PROCEDURE — 84295 ASSAY OF SERUM SODIUM: CPT

## 2021-05-03 PROCEDURE — C1769 GUIDE WIRE: HCPCS

## 2021-05-03 PROCEDURE — 2580000003 HC RX 258: Performed by: INTERNAL MEDICINE

## 2021-05-03 PROCEDURE — C1882 AICD, OTHER THAN SING/DUAL: HCPCS

## 2021-05-03 PROCEDURE — 6370000000 HC RX 637 (ALT 250 FOR IP): Performed by: EMERGENCY MEDICINE

## 2021-05-03 PROCEDURE — 85610 PROTHROMBIN TIME: CPT

## 2021-05-03 RX ORDER — OXYCODONE HYDROCHLORIDE AND ACETAMINOPHEN 5; 325 MG/1; MG/1
1 TABLET ORAL ONCE
Status: COMPLETED | OUTPATIENT
Start: 2021-05-03 | End: 2021-05-03

## 2021-05-03 RX ORDER — SODIUM CHLORIDE 9 MG/ML
25 INJECTION, SOLUTION INTRAVENOUS PRN
Status: DISCONTINUED | OUTPATIENT
Start: 2021-05-03 | End: 2021-05-04 | Stop reason: HOSPADM

## 2021-05-03 RX ORDER — SODIUM CHLORIDE 0.9 % (FLUSH) 0.9 %
5-40 SYRINGE (ML) INJECTION EVERY 12 HOURS SCHEDULED
Status: DISCONTINUED | OUTPATIENT
Start: 2021-05-03 | End: 2021-05-04 | Stop reason: HOSPADM

## 2021-05-03 RX ORDER — CEFAZOLIN SODIUM 1 G/50ML
1000 INJECTION, SOLUTION INTRAVENOUS ONCE
Status: DISCONTINUED | OUTPATIENT
Start: 2021-05-03 | End: 2021-05-04 | Stop reason: HOSPADM

## 2021-05-03 RX ORDER — SODIUM CHLORIDE 9 MG/ML
INJECTION, SOLUTION INTRAVENOUS CONTINUOUS
Status: DISCONTINUED | OUTPATIENT
Start: 2021-05-03 | End: 2021-05-04 | Stop reason: HOSPADM

## 2021-05-03 RX ORDER — SODIUM CHLORIDE 0.9 % (FLUSH) 0.9 %
5-40 SYRINGE (ML) INJECTION PRN
Status: DISCONTINUED | OUTPATIENT
Start: 2021-05-03 | End: 2021-05-04 | Stop reason: HOSPADM

## 2021-05-03 RX ORDER — ACETAMINOPHEN 325 MG/1
650 TABLET ORAL ONCE
Status: COMPLETED | OUTPATIENT
Start: 2021-05-03 | End: 2021-05-03

## 2021-05-03 RX ADMIN — SODIUM CHLORIDE: 9 INJECTION, SOLUTION INTRAVENOUS at 08:26

## 2021-05-03 RX ADMIN — OXYCODONE HYDROCHLORIDE AND ACETAMINOPHEN 1 TABLET: 5; 325 TABLET ORAL at 13:34

## 2021-05-03 RX ADMIN — ACETAMINOPHEN 650 MG: 325 TABLET ORAL at 11:47

## 2021-05-03 RX ADMIN — OXYCODONE HYDROCHLORIDE AND ACETAMINOPHEN 1 TABLET: 5; 325 TABLET ORAL at 22:47

## 2021-05-03 ASSESSMENT — PAIN SCALES - GENERAL
PAINLEVEL_OUTOF10: 7
PAINLEVEL_OUTOF10: 6
PAINLEVEL_OUTOF10: 2

## 2021-05-03 NOTE — PROGRESS NOTES
Patient admitted, consent signed and questions answered. Patient ready for procedure. Call light to reach with side rails up 2 of 2. Mother Hall Irons at bedside with patient. History and physical needs updated.     2% Chlorhexidine cloths used to prep site

## 2021-05-03 NOTE — PROCEDURES
Documented prior MI and NYHA class IV heart failure and meets all current medicare coverage for CRT device. Section C. (if one or more box is checked, then patient does not meet NCD coverage requirements for primary prevention)  [] NYHA class IV (Covered Indications 4 and 8)  [] Cardiogenic shock or symptomatic hypotension while in a stable baseline rhythm. [] CABG or PTCA within past 3 months  [] Acute MI within past 40 days. [] Patient is unable to give informed consent. [] Symptoms / Findings making them a candidate for coronary revascularization. [] Irreversible brain damage from pre existing cerebral disease.  [] Any disease, other than cardiac disease, with likely survival less than 1 year.  / Device Data:        Name Implant Date   Medtronic 5/3/2021          Model # Serial #   Bi-V [id number]   RA-Lead [id number]   RV-Lead [id number]   LV-Lead [id number]       ATRIUM R VENT L VENT   P waves 2.0 mV R waves: 15 mV R waves: 30 mV   Threshold: 0.2/0.4 Threshold: 0.7/0.4 Threshold: 0.7/0.4   Impedance: 570 Impedance: 589 Impedance:  513       · [x] Sedation monitoring  · [x] Left Subclavian Angiogram   · [x] RV lead   · [x] RA lead   · [x] LV lead   · [x] Intra - OP Lead Testing  · [x] Coronary Sinus Angiogram   · [x] Pocket   · [x] Generators Implant  · [x] Fluoro time: 12 min 12 secs      Procedure  After the usual preparation of the left neck and chest, the patient was draped in the usual sterile manner. Local anesthesia was infused below the left clavicle from the midline laterally. An incision was made inferior and parallel to the clavicle. The incision was carried down to the fascia. A pocket was formed inferior using blunt dissection. A thin walled 18 gauge needle was used to puncture the left subclavian vein using the modified Seldinger technique. A guide wire was passed into the right heart under fluoroscopic control.   This was repeated with a second guide wire. RV Lead Implant:  A 9.0 Salvadorean sheath was then passed over the guide wire and the guide wire removed. The ventricular lead was advanced through the sheath into the right heart. The lead was then positioned in the right ventricle under fluoroscopic control. The acute pacing and sensing thresholds were measured and found to be satisfactory. LV Lead Implant:  A 9.0 Salvadorean sheath was then passed over the guide wire and the guide wire removed. a multi purpose guiding catheter was introduced and engaged with the coronary sinus. A balloon tip catheter was used to perform coronary sinus angiogram and identify the lateral baranch. The left ventricular lead was advanced through the sheath into the lateral branch and positioned in the best position under fluoroscopic control, with multiple testing performed in that position . The acute pacing and sensing thresholds were measured and found to be satisfactory. RA Lead Implant:  A second 7.0 Salvadorean sheath was then passed over the guide wire and the guide wire removed. The atrial lead was advanced into the right heart. The lead was then positioned in the right atrium. The acute pacing and sensing thresholds were measured and found to be acceptable. The two sheaths were removed and the leads were secured to the fascia. Generator: The implanted leads were attached to the pacemaker / AICD using the setscrews. The pocket was irrigated with antibiotic solution. The pulse generator and leads were coiled and placed in the pocket. Fluoroscopy was used to verify the final placement of the pacemaker and leads. The pocket was closed using multiple layers of suture and a dry sterile dressing was applied. There were no complications, patient tolerated the procedure well. The patient left the EP lab in stable condition.           Impression / Device:  Successful Implantation of: CRT-D  Estimated blood loss less than 25 ml  Patient

## 2021-05-03 NOTE — PROGRESS NOTES
Received post ICD procedure to [facility] room 4. Assessment obtained. Restrictions reviewed with patient. Post procedure pathway initiated. Left chest site soft , dressing dry and intact. No hematoma noted. Family at side.      PAtient complaining of a thumping feeling in her chest.    Daryl Vee from [place] at bedside to interrogate device

## 2021-05-03 NOTE — H&P
[facility]  Procedure History and Physical Update          Patient Name: Ajith Palacio  MRN:    [id number]  YOB: 1970  Date of evaluation:  5/3/2021    Procedure:    CRT-D    Indication for procedure:  Non-ischemic Cardiomyopathy (EF 15%) w/ LBBB      Please refer to the office note completed by Dr. Lilia Yepez on 4/21/21 in the medical record and note that:    [x] I have examined the patient and reviewed the H&P/Consult and there are no changes to be made to the assessment or plan. [] I have examined the patient and reviewed the H&P/Consult and have noted the following changes:    Past Medical History:   Diagnosis Date    CAD (coronary artery disease)     GERD (gastroesophageal reflux disease)     Hyperlipidemia     Hypertension     Prediabetes        Past Surgical History:   Procedure Laterality Date    CARDIAC DEFIBRILLATOR PLACEMENT  05/03/2021    CORONARY ARTERY BYPASS GRAFT  2004    EYE SURGERY  1990    Eye Socket    SHOULDER SURGERY Right 1994    rods and pins placed    TONSILLECTOMY         Family History   Problem Relation Age of Onset    Heart Disease Father     Colon Cancer Brother        No Known Allergies    Prior to Admission medications    Medication Sig Start Date End Date Taking? Authorizing Provider   enoxaparin (LOVENOX) 100 MG/ML injection Inject 1 mL into the skin 2 times daily 4/28/21  Yes Conception MD Francisca   empagliflozin (JARDIANCE) 10 MG tablet Take 1 tablet by mouth daily 3/12/21  Yes Conception MD Francisca   sacubitril-valsartan (ENTRESTO) 24-26 MG per tablet Take 1 tablet by mouth 2 times daily   Yes Historical Provider, MD   spironolactone (ALDACTONE) 25 MG tablet Take 25 mg by mouth daily   Yes Historical Provider, MD   warfarin (COUMADIN) 1 MG tablet Take 5.5 mg (1 of the 5 mg tablet + 1/2 of the 1 mg tablet) daily or as directed by Coumadin Clinic.  12/2/20  Yes Naima Espinosa MD   metoprolol succinate (TOPROL XL) 25 MG extended release tablet Take 75 mg by mouth 2 times daily   Yes Historical Provider, MD   furosemide (LASIX) 40 MG tablet Take 40 mg by mouth daily   Yes Historical Provider, MD   atorvastatin (LIPITOR) 40 MG tablet Take 40 mg by mouth daily   Yes Historical Provider, MD   potassium chloride (KLOR-CON) 20 MEQ packet Take 20 mEq by mouth daily   Yes Historical Provider, MD   warfarin (COUMADIN) 5 MG tablet Take 1 tablet by mouth daily Indications: Taking 5.5 Mg DAILY 11/25/20  Yes Stacy Goode MD   vitamin D (ERGOCALCIFEROL) 1.25 MG (31150 UT) CAPS capsule Take 1 capsule by mouth once a week 3/12/21   Stacy Goode MD   Handicap Placard MISC by Does not apply route For 5 years 11/30/20   Stacy Goode MD   medroxyPROGESTERone (PROVERA) 2.5 MG tablet Take 5 mg by mouth daily    Historical Provider, MD         Vitals:    05/03/21 0800   BP: 117/84   Pulse: 83   Resp: 11   Temp: 98.1 °F (36.7 °C)   SpO2: 99%       Constitutional and General Appearance:   alert, cooperative, no distress and appears stated age  HEENT:  · PERRL, EOMI  Respiratory:  · Normal excursion and expansion without use of accessory muscles  · Resp Auscultation:  Good respiratory effort. No for increased work of breathing. On auscultation: clear to auscultation bilaterally  Cardiovascular:  · Regular rate and rhythm. · S1/S2  · No murmurs. · The apical impulse is not displaced  Abdomen:  · Soft  · Bowel sounds present  · Non-tender to palpation  Extremities:  · No cyanosis or clubbing  · Lower extremity edema: No  Skin:  · Warm and dry  Neurological:  · Alert and oriented. · Moves all extremities well    Impression:  1. Nonischemic cardiomyopathy with ejection fraction of around 15% w/ LBBB on EKG  2. Hypertension   3. Hyperlipidemia  4. DVT on coumadin (INR 1.0 today)  5. Peripheral vascular disease status post fem-pop bypass surgery    Plan:  · Proceed with planned procedure. · Further orders to follow.     The risks, indications, benefits, as well as alternatives to the procedure were discussed with the patient and family. The risks include but is not limited to bleeding, hematoma formation, pneumothorax, infection, cardiac perforation, and rarely heart attack. The patient understood and wished to proceed.      Electronically signed on 05/03/21 at 8:30 AM by:    Rene Olson MD   Fellow, [id number] [facility]

## 2021-05-04 ENCOUNTER — APPOINTMENT (OUTPATIENT)
Dept: GENERAL RADIOLOGY | Age: 51
End: 2021-05-04
Payer: COMMERCIAL

## 2021-05-04 VITALS
HEART RATE: 77 BPM | SYSTOLIC BLOOD PRESSURE: 97 MMHG | OXYGEN SATURATION: 97 % | BODY MASS INDEX: 38.16 KG/M2 | DIASTOLIC BLOOD PRESSURE: 66 MMHG | HEIGHT: 68 IN | WEIGHT: 251.77 LBS | TEMPERATURE: 97.8 F | RESPIRATION RATE: 16 BRPM

## 2021-05-04 PROBLEM — Z79.01 ANTICOAGULATED ON COUMADIN: Status: ACTIVE | Noted: 2021-05-04

## 2021-05-04 LAB
ALBUMIN SERPL-MCNC: 3.7 G/DL (ref 3.5–5.2)
ALBUMIN/GLOBULIN RATIO: 1.3 (ref 1–2.5)
ALP BLD-CCNC: 105 U/L (ref 35–104)
ALT SERPL-CCNC: 43 U/L (ref 5–33)
ANION GAP SERPL CALCULATED.3IONS-SCNC: 11 MMOL/L (ref 9–17)
AST SERPL-CCNC: 56 U/L
BILIRUB SERPL-MCNC: 0.52 MG/DL (ref 0.3–1.2)
BUN BLDV-MCNC: 14 MG/DL (ref 6–20)
BUN/CREAT BLD: ABNORMAL (ref 9–20)
CALCIUM SERPL-MCNC: 8.8 MG/DL (ref 8.6–10.4)
CHLORIDE BLD-SCNC: 104 MMOL/L (ref 98–107)
CHOLESTEROL/HDL RATIO: 4.4
CHOLESTEROL: 171 MG/DL
CO2: 21 MMOL/L (ref 20–31)
CREAT SERPL-MCNC: 0.66 MG/DL (ref 0.5–0.9)
GFR AFRICAN AMERICAN: >60 ML/MIN
GFR NON-AFRICAN AMERICAN: >60 ML/MIN
GFR SERPL CREATININE-BSD FRML MDRD: ABNORMAL ML/MIN/{1.73_M2}
GFR SERPL CREATININE-BSD FRML MDRD: ABNORMAL ML/MIN/{1.73_M2}
GLUCOSE BLD-MCNC: 109 MG/DL (ref 70–99)
HCT VFR BLD CALC: 42.7 % (ref 36–46)
HDLC SERPL-MCNC: 39 MG/DL
HEMOGLOBIN: 14.2 G/DL (ref 12–16)
LDL CHOLESTEROL: 120 MG/DL (ref 0–130)
MAGNESIUM: 2.1 MG/DL (ref 1.6–2.6)
MCH RBC QN AUTO: 30.9 PG (ref 26–34)
MCHC RBC AUTO-ENTMCNC: 33.3 G/DL (ref 31–37)
MCV RBC AUTO: 92.8 FL (ref 80–100)
NRBC AUTOMATED: NORMAL PER 100 WBC
PDW BLD-RTO: 13.8 % (ref 12.5–15.4)
PLATELET # BLD: 225 K/UL (ref 140–450)
PMV BLD AUTO: 8.3 FL (ref 6–12)
POTASSIUM SERPL-SCNC: 4.1 MMOL/L (ref 3.7–5.3)
RBC # BLD: 4.6 M/UL (ref 4–5.2)
SODIUM BLD-SCNC: 136 MMOL/L (ref 135–144)
TOTAL PROTEIN: 6.5 G/DL (ref 6.4–8.3)
TRIGL SERPL-MCNC: 61 MG/DL
TROPONIN INTERP: ABNORMAL
TROPONIN INTERP: ABNORMAL
TROPONIN T: ABNORMAL NG/ML
TROPONIN T: ABNORMAL NG/ML
TROPONIN, HIGH SENSITIVITY: 26 NG/L (ref 0–14)
TROPONIN, HIGH SENSITIVITY: 31 NG/L (ref 0–14)
VLDLC SERPL CALC-MCNC: ABNORMAL MG/DL (ref 1–30)
WBC # BLD: 7 K/UL (ref 3.5–11)

## 2021-05-04 PROCEDURE — 6370000000 HC RX 637 (ALT 250 FOR IP): Performed by: NURSE PRACTITIONER

## 2021-05-04 PROCEDURE — 36415 COLL VENOUS BLD VENIPUNCTURE: CPT

## 2021-05-04 PROCEDURE — 85027 COMPLETE CBC AUTOMATED: CPT

## 2021-05-04 PROCEDURE — 83735 ASSAY OF MAGNESIUM: CPT

## 2021-05-04 PROCEDURE — 80053 COMPREHEN METABOLIC PANEL: CPT

## 2021-05-04 PROCEDURE — 2580000003 HC RX 258: Performed by: NURSE PRACTITIONER

## 2021-05-04 PROCEDURE — 71046 X-RAY EXAM CHEST 2 VIEWS: CPT

## 2021-05-04 PROCEDURE — G0378 HOSPITAL OBSERVATION PER HR: HCPCS

## 2021-05-04 PROCEDURE — APPSS60 APP SPLIT SHARED TIME 46-60 MINUTES: Performed by: NURSE PRACTITIONER

## 2021-05-04 PROCEDURE — 80061 LIPID PANEL: CPT

## 2021-05-04 PROCEDURE — 93005 ELECTROCARDIOGRAM TRACING: CPT | Performed by: NURSE PRACTITIONER

## 2021-05-04 PROCEDURE — 99219 PR INITIAL OBSERVATION CARE/DAY 50 MINUTES: CPT | Performed by: INTERNAL MEDICINE

## 2021-05-04 PROCEDURE — 84484 ASSAY OF TROPONIN QUANT: CPT

## 2021-05-04 RX ORDER — SPIRONOLACTONE 25 MG/1
25 TABLET ORAL DAILY
Status: DISCONTINUED | OUTPATIENT
Start: 2021-05-04 | End: 2021-05-04 | Stop reason: HOSPADM

## 2021-05-04 RX ORDER — ACETAMINOPHEN 325 MG/1
650 TABLET ORAL EVERY 6 HOURS PRN
Status: DISCONTINUED | OUTPATIENT
Start: 2021-05-04 | End: 2021-05-04 | Stop reason: HOSPADM

## 2021-05-04 RX ORDER — ATORVASTATIN CALCIUM 40 MG/1
40 TABLET, FILM COATED ORAL DAILY
Status: DISCONTINUED | OUTPATIENT
Start: 2021-05-04 | End: 2021-05-04 | Stop reason: SDUPTHER

## 2021-05-04 RX ORDER — ONDANSETRON 2 MG/ML
4 INJECTION INTRAMUSCULAR; INTRAVENOUS EVERY 6 HOURS PRN
Status: DISCONTINUED | OUTPATIENT
Start: 2021-05-04 | End: 2021-05-04 | Stop reason: HOSPADM

## 2021-05-04 RX ORDER — ATORVASTATIN CALCIUM 40 MG/1
40 TABLET, FILM COATED ORAL NIGHTLY
Status: DISCONTINUED | OUTPATIENT
Start: 2021-05-04 | End: 2021-05-04 | Stop reason: HOSPADM

## 2021-05-04 RX ORDER — PROMETHAZINE HYDROCHLORIDE 25 MG/1
12.5 TABLET ORAL EVERY 6 HOURS PRN
Status: DISCONTINUED | OUTPATIENT
Start: 2021-05-04 | End: 2021-05-04 | Stop reason: HOSPADM

## 2021-05-04 RX ORDER — MAGNESIUM SULFATE 1 G/100ML
1000 INJECTION INTRAVENOUS PRN
Status: DISCONTINUED | OUTPATIENT
Start: 2021-05-04 | End: 2021-05-04 | Stop reason: HOSPADM

## 2021-05-04 RX ORDER — SODIUM CHLORIDE 0.9 % (FLUSH) 0.9 %
10 SYRINGE (ML) INJECTION PRN
Status: DISCONTINUED | OUTPATIENT
Start: 2021-05-04 | End: 2021-05-04 | Stop reason: HOSPADM

## 2021-05-04 RX ORDER — POTASSIUM CHLORIDE 7.45 MG/ML
10 INJECTION INTRAVENOUS PRN
Status: DISCONTINUED | OUTPATIENT
Start: 2021-05-04 | End: 2021-05-04 | Stop reason: HOSPADM

## 2021-05-04 RX ORDER — SODIUM CHLORIDE 0.9 % (FLUSH) 0.9 %
5-40 SYRINGE (ML) INJECTION EVERY 12 HOURS SCHEDULED
Status: DISCONTINUED | OUTPATIENT
Start: 2021-05-04 | End: 2021-05-04 | Stop reason: HOSPADM

## 2021-05-04 RX ORDER — FUROSEMIDE 20 MG/1
40 TABLET ORAL DAILY
Status: DISCONTINUED | OUTPATIENT
Start: 2021-05-04 | End: 2021-05-04 | Stop reason: HOSPADM

## 2021-05-04 RX ORDER — SODIUM CHLORIDE 9 MG/ML
25 INJECTION, SOLUTION INTRAVENOUS PRN
Status: DISCONTINUED | OUTPATIENT
Start: 2021-05-04 | End: 2021-05-04 | Stop reason: HOSPADM

## 2021-05-04 RX ORDER — POTASSIUM CHLORIDE 20 MEQ/1
40 TABLET, EXTENDED RELEASE ORAL PRN
Status: DISCONTINUED | OUTPATIENT
Start: 2021-05-04 | End: 2021-05-04 | Stop reason: HOSPADM

## 2021-05-04 RX ORDER — ACETAMINOPHEN 650 MG/1
650 SUPPOSITORY RECTAL EVERY 6 HOURS PRN
Status: DISCONTINUED | OUTPATIENT
Start: 2021-05-04 | End: 2021-05-04 | Stop reason: HOSPADM

## 2021-05-04 RX ORDER — POTASSIUM CHLORIDE 750 MG/1
20 CAPSULE, EXTENDED RELEASE ORAL DAILY
Status: DISCONTINUED | OUTPATIENT
Start: 2021-05-04 | End: 2021-05-04 | Stop reason: HOSPADM

## 2021-05-04 RX ORDER — NITROGLYCERIN 0.4 MG/1
0.4 TABLET SUBLINGUAL EVERY 5 MIN PRN
Status: DISCONTINUED | OUTPATIENT
Start: 2021-05-04 | End: 2021-05-04 | Stop reason: HOSPADM

## 2021-05-04 RX ADMIN — POTASSIUM CHLORIDE 20 MEQ: 750 CAPSULE, EXTENDED RELEASE ORAL at 10:13

## 2021-05-04 RX ADMIN — METOPROLOL SUCCINATE 75 MG: 50 TABLET, EXTENDED RELEASE ORAL at 00:50

## 2021-05-04 RX ADMIN — FUROSEMIDE 40 MG: 20 TABLET ORAL at 10:13

## 2021-05-04 RX ADMIN — SODIUM CHLORIDE, PRESERVATIVE FREE 10 ML: 5 INJECTION INTRAVENOUS at 10:14

## 2021-05-04 RX ADMIN — SACUBITRIL AND VALSARTAN 1 TABLET: 24; 26 TABLET, FILM COATED ORAL at 00:50

## 2021-05-04 RX ADMIN — SACUBITRIL AND VALSARTAN 1 TABLET: 24; 26 TABLET, FILM COATED ORAL at 10:14

## 2021-05-04 RX ADMIN — ASPIRIN 325 MG: 325 TABLET, COATED ORAL at 00:50

## 2021-05-04 RX ADMIN — SPIRONOLACTONE 25 MG: 25 TABLET, FILM COATED ORAL at 10:13

## 2021-05-04 RX ADMIN — METOPROLOL SUCCINATE 75 MG: 50 TABLET, EXTENDED RELEASE ORAL at 10:13

## 2021-05-04 RX ADMIN — DESMOPRESSIN ACETATE 40 MG: 0.2 TABLET ORAL at 00:50

## 2021-05-04 ASSESSMENT — PAIN DESCRIPTION - DESCRIPTORS: DESCRIPTORS: ACHING

## 2021-05-04 ASSESSMENT — ENCOUNTER SYMPTOMS
CONSTIPATION: 0
VOMITING: 0
BLOOD IN STOOL: 0
STRIDOR: 0
COUGH: 1
ABDOMINAL PAIN: 0
NAUSEA: 0
SHORTNESS OF BREATH: 1
DIARRHEA: 0
WHEEZING: 0

## 2021-05-04 ASSESSMENT — PAIN SCALES - GENERAL
PAINLEVEL_OUTOF10: 1
PAINLEVEL_OUTOF10: 1

## 2021-05-04 ASSESSMENT — PAIN DESCRIPTION - LOCATION: LOCATION: CHEST

## 2021-05-04 ASSESSMENT — PAIN DESCRIPTION - ORIENTATION: ORIENTATION: LEFT

## 2021-05-04 ASSESSMENT — PAIN DESCRIPTION - PAIN TYPE: TYPE: SURGICAL PAIN

## 2021-05-04 NOTE — DISCHARGE SUMMARY
[facility]  Office: [address], DO, Chiki Uche, DO, Gurvinder Osborne, DO, Rosana Caceres Blood, DO, Kenny Larsen MD, Rossi Archuleta MD, Bill Chaney MD, Ector Adams MD, Patrick Wilson MD, Mey Cain MD, Idania Boo MD, Indy Palomares MD, Raimundo Ibarra, DO, Ofelia Barney MD, Macie Fraga DO, Aidee Daniel MD,  Skye Arzola, DO, Angela Soriano MD, Britney Tony MD, Erik Pimentle MD, Ash Hess MD, Ericka Kohler [facility], [facility] Yoelfaith, CNP, Ricardo Crawford, CNP, Casandra Jacobo, CNS, Bogdan Ruth, CNP, Yanna Bui, CNP, Richard Medina, CNP, Hemant Arias, CNP, Juwan Pelayo, CNP, Tello Arevalo PA-C, Cathy Boyd, [facility], Grant Barragan, CNP, Loyd Sun, CNP, Sarbjit Meadows, CNP, Kimberly West, CNP, Josephine Fernandes, CNP, [facility], [facility] [address]    Discharge Summary     Patient ID: Chema Napier  :  1970   MRN: [id number]     ACCOUNT:  [de-identified]   Patient's PCP: Jose Ortiz MD  Admit Date: 5/3/2021   Discharge Date: 2021     Length of Stay: 0  Code Status:  Prior  Admitting Physician: Idania Boo MD  Discharge Physician: Ced Kramer APRYAO - CNP     Active Discharge Diagnoses:     Hospital Problem Lists:  Principal Problem:    AICD malfunction, initial encounter  Active Problems:    Hx of deep venous thrombosis    S/P CABG (coronary artery bypass graft)    Gastroesophageal reflux disease    Chronic congestive heart failure ([facility] [address]    Essential hypertension    History of aorto-femoral bypass    S/P internal cardiac defibrillator procedure    Anticoagulated on Coumadin  Resolved Problems:    * No resolved hospital problems. *      Admission Condition:  fair     Discharged Condition: stable    Hospital Stay:     Hospital Course:  Chema Napier is a 53 y.o.  Non-/non  female who presents with Pacemaker Problem (reports defib was placed today, reports depending on position, she feels it \"pulsating\" denies pain )   and is admitted to the hospital for the management of AICD malfunction, initial encounter.     Patient states she had a pacemaker AICD placed yesterday with Dr. Love Schreiber. Patient has a history of CAD, EF of 15%, hypertension, hyperlipidemia and prediabetes. She states she had an episode right after the pacemaker was placed of this pulsating feeling in her chest and the pacemaker was adjusted and her symptoms resolved. She states last night after she was discharged home she was watching TV and she has noticed these palpitations/pulsating in her chest when she was sitting up. She denies chest pain, shortness of breath, nausea or vomiting. She does report taking some Tylenol for generalized soreness postop. She also reports that she has been off her Coumadin since last Tuesday related to preop instructions. She states she took Lovenox Friday Saturday and Sunday and was supposed to resume her Coumadin today. I told her we would check with cardiology about resuming her Coumadin.     Pacemaker evaluation was completed. Adjustments were made to the settings. Per the pacer rep the pacemaker is functioning normally.     Per cardiology the patient is okay for discharge home and to resume her Coumadin tonight. Patient encouraged to resume her normal Coumadin regimen and to call Coumadin clinic either later today or tomorrow.     Patient encouraged to follow-up in 7 to 10 days with her PCP to keep current follow-up appointment with cardiology      Significant therapeutic interventions:   Pacemaker interrogation    Significant Diagnostic Studies:   Labs / Micro:  CBC:   Lab Results   Component Value Date    WBC 7.0 05/04/2021    RBC 4.60 05/04/2021    HGB 14.2 05/04/2021    HCT 42.7 05/04/2021    MCV 92.8 05/04/2021    MCH 30.9 05/04/2021    MCHC 33.3 05/04/2021    RDW 13.8 05/04/2021     05/04/2021     BMP:    Lab Results   Component Value Date GLUCOSE 109 05/04/2021     05/04/2021    K 4.1 05/04/2021     05/04/2021    CO2 21 05/04/2021    ANIONGAP 11 05/04/2021    BUN 14 05/04/2021    CREATININE 0.66 05/04/2021    BUNCRER NOT REPORTED 05/04/2021    CALCIUM 8.8 05/04/2021    LABGLOM >60 05/04/2021    GFRAA >60 05/04/2021    GFR      05/04/2021    GFR NOT REPORTED 05/04/2021     CMP:    Lab Results   Component Value Date    GLUCOSE 109 05/04/2021     05/04/2021    K 4.1 05/04/2021     05/04/2021    CO2 21 05/04/2021    BUN 14 05/04/2021    CREATININE 0.66 05/04/2021    ANIONGAP 11 05/04/2021    ALKPHOS 105 05/04/2021    ALT 43 05/04/2021    AST 56 05/04/2021    BILITOT 0.52 05/04/2021    LABALBU 3.7 05/04/2021    ALBUMIN 1.3 05/04/2021    LABGLOM >60 05/04/2021    GFRAA >60 05/04/2021    GFR      05/04/2021    GFR NOT REPORTED 05/04/2021    PROT 6.5 05/04/2021    CALCIUM 8.8 05/04/2021     FLP:    Lab Results   Component Value Date    CHOL 171 05/04/2021    TRIG 61 05/04/2021    HDL 39 05/04/2021     TSH:    Lab Results   Component Value Date    TSH 1.25 03/11/2021        Radiology:  Xr Chest (2 Vw)    Result Date: 5/4/2021  Mild cardiomegaly and chronic pulmonary change with stable pacemaker. Xr Chest Portable    Result Date: 5/3/2021  Status post left-sided ppm/AICD with satisfactory electrode lead positioning and no pneumothorax. Consultations:    Consults:     Final Specialist Recommendations/Findings:   IP CONSULT TO CARDIOLOGY      The patient was seen and examined on day of discharge and this discharge summary is in conjunction with any daily progress note from day of discharge. Discharge plan:     Disposition: Home    Physician Follow Up:     Aisha Lang [facility]   [facility]  [phone]    On 5/11/2021  For wound re-check at 11:30 am with RN.       Kelvin Aviles MD  [facility]  [address]  [address]  [phone]      Hospital follow-up in 7 to 10 days Diet: cardiac diet, diabetic diet and low fat, low cholesterol diet    Activity: As tolerated    Instructions to Patient: Call 911 or return to the ER if you experience a recurrence of your symptoms or start having fever, chills, chest pain or shortness of breath. Discharge Medications:      Medication List      CHANGE how you take these medications    warfarin 1 MG tablet  Commonly known as: Coumadin  Take as directed. If you are unsure how to take this medication, talk to your nurse or doctor. Original instructions: Take 5.5 mg (1 of the 5 mg tablet + 1/2 of the 1 mg tablet) daily or as directed by Coumadin Clinic. What changed: Another medication with the same name was removed. Continue taking this medication, and follow the directions you see here. CONTINUE taking these medications    atorvastatin 40 MG tablet  Commonly known as: LIPITOR     Entresto 24-26 MG per tablet  Generic drug: sacubitril-valsartan     furosemide 40 MG tablet  Commonly known as: LASIX     Handicap Placard Misc  by Does not apply route For 5 years     Jardiance 10 MG tablet  Generic drug: empagliflozin  Take 1 tablet by mouth daily     metoprolol succinate 25 MG extended release tablet  Commonly known as: TOPROL XL     potassium chloride 20 MEQ packet  Commonly known as: KLOR-CON     spironolactone 25 MG tablet  Commonly known as: ALDACTONE     vitamin D 1.25 MG (29945 UT) Caps capsule  Commonly known as: ERGOCALCIFEROL  Take 1 capsule by mouth once a week        STOP taking these medications    enoxaparin 100 MG/ML injection  Commonly known as: Lovenox            No discharge procedures on file. Time Spent on discharge is  20 mins in patient examination, evaluation, counseling as well as medication reconciliation, prescriptions for required medications, discharge plan and follow up.     Electronically signed by   STARR Kirk CNP  5/4/2021  6:23 PM      Thank you Dr. Kelvin Aviles MD for the opportunity to be involved in this patient's care.

## 2021-05-04 NOTE — PLAN OF CARE
Problem: Pain:  Goal: Pain level will decrease  Description: Pain level will decrease  Outcome: Ongoing   No c/o pain this shift. Will continue to monitor. Problem: Gas Exchange - Impaired:  Goal: Levels of oxygenation will improve  Description: Levels of oxygenation will improve  Outcome: Ongoing   Patient O2 remains wdl this shift. Will continue to monitor.

## 2021-05-04 NOTE — PROGRESS NOTES
Pt given discharge instructions. Instructed to call and make a follow up appointment with Dr. Michael Mejias and confirm that the May 11th appointment is more than a wound check. States understanding. Pt wheeled out via wheel chair to pts car.

## 2021-05-04 NOTE — PROGRESS NOTES
RN spoke to Dr. Wilma Tejeda (Cardiology) regarding changes in patient's EKG this morning. No new orders at this time. Dr. Peter Menjivar wants to have her pacemaker looked at today to see if it needs interrogated.

## 2021-05-04 NOTE — CARE COORDINATION
[facility] ICU Quality Flow/Interdisciplinary Rounds Progress Note    Quality Flow Rounds held on May 4, 2021 at [address] Attending:  Bedside Nurse, , Nursing Unit Leadership, Dietary, Respiratory Therapy, Physical Therapy and Occupational Therapy    Anticipated Discharge Date:  Expected Discharge Date: 05/05/21    Anticipated Discharge Disposition: home   Readmission Risk              Risk of Unplanned Readmission:        0           Discussed patient goal for the day, patient clinical progression, and barriers to discharge.   The following Goal(s) of the Day/Commitment(s) have been identified:  Diagnostics - Report Results - pacer interrogation      [facility] QUINN FLOOD  May 4, 2021

## 2021-05-04 NOTE — PROGRESS NOTES
Cardiology informed of pacemaker interrogation and OK'd for discharge per Leonor Rock NP    OK to resume coumadin tonight per verbal and note.

## 2021-05-04 NOTE — H&P
[facility]  Office: [address], DO, Kelley Ashby, DO, Yehuda Whitehead, DO, Alicecely Lundy, DO, Gabby Greco MD, Broderick Butcher MD, Dolores Martin MD, Dung Higgins MD, Farhad Jin MD, Silvia Wooten MD, Roberto Stock MD, Alexi Molina MD, Crystal Vargas, DO, Ranjana Vines MD, Mera Weber DO, Mukund Bang MD,  Lachelle Rebolledo DO, Dietrich Halsted, MD, Marisela Lloyd MD, Josephine Cruz MD, Jovany Seo MD, Dalila Davidson, [facility], [facility], CNP, Tomy Cortez, CNP, Stewart Peter, CNS, Skylar Guo, CNP, Golden Hess, CNP, Daniel Tracy, CNP, Gavin Garay, CNP, Suzette Hollingsworth, CNP, Harleen Khalil PA-C, Kali Felix, [facility], Jami Oteros, [facility], Arabella Maravilla, CNP, Harjit Rebolledo, CNP, Gali Shirley, CNP, Sandi Cool, CNP, Ene Weldon, [facility]   [address] [facility]    HISTORY AND PHYSICAL EXAMINATION            Date:   5/4/2021  Patient name:  Christiano Arias  Date of admission:  5/3/2021  9:47 PM  MRN:   [id number]  Account:  [de-identified]  YOB: 1970  PCP:    Bernard Tillman MD  Room:   [address] [place] [address]  Code Status:    Full Code    Chief Complaint:     Chief Complaint   Patient presents with    Pacemaker Problem     reports defib was placed today, reports depending on position, she feels it \"pulsating\" denies pain        History Obtained From:     patient, electronic medical record    History of Present Illness:     Christiano Arias is a 53 y.o. Non-/non  female who presents with Pacemaker Problem (reports defib was placed today, reports depending on position, she feels it \"pulsating\" denies pain )   and is admitted to the hospital for the management of AICD malfunction, initial encounter. Patient states she had a pacemaker AICD placed yesterday with Dr. Steffen Sherwood. Patient has a history of CAD, EF of 15%, hypertension, hyperlipidemia and prediabetes.   She states she had an episode right after the pacemaker was placed of this pulsating feeling in her chest and the pacemaker was adjusted and her symptoms resolved. She states last night after she was discharged home she was watching TV and she has noticed these palpitations/pulsating in her chest when she was sitting up. She denies chest pain, shortness of breath, nausea or vomiting. She does report taking some Tylenol for generalized soreness postop. She also reports that she has been off her Coumadin since last Tuesday related to preop instructions. She states she took Lovenox Friday Saturday and Sunday and was supposed to resume her Coumadin today. I told her we would check with cardiology about resuming her Coumadin. Pacemaker evaluation was completed. Adjustments were made to the settings. Per the pacer rep the pacemaker is functioning normally. Per cardiology the patient is okay for discharge home and to resume her Coumadin tonight. Patient encouraged to resume her normal Coumadin regimen and to call Coumadin clinic either later today or tomorrow. Past Medical History:     Past Medical History:   Diagnosis Date    CAD (coronary artery disease)     GERD (gastroesophageal reflux disease)     Hyperlipidemia     Hypertension     Prediabetes         Past Surgical History:     Past Surgical History:   Procedure Laterality Date    CARDIAC DEFIBRILLATOR PLACEMENT  05/03/2021    CORONARY ARTERY BYPASS GRAFT  2004    EYE SURGERY  1990    Eye Socket    SHOULDER SURGERY Right 1994    rods and pins placed    TONSILLECTOMY          Medications Prior to Admission:     Prior to Admission medications    Medication Sig Start Date End Date Taking?  Authorizing Provider   enoxaparin (LOVENOX) 100 MG/ML injection Inject 1 mL into the skin 2 times daily 4/28/21  Yes Crystal Palacio MD   vitamin D (ERGOCALCIFEROL) 1.25 MG (75512 UT) CAPS capsule Take 1 capsule by mouth once a week 3/12/21  Yes Crystal Palacio MD empagliflozin (JARDIANCE) 10 MG tablet Take 1 tablet by mouth daily 3/12/21  Yes Ubaldo Mullen MD   sacubitril-valsartan (ENTRESTO) 24-26 MG per tablet Take 1 tablet by mouth 2 times daily   Yes Historical Provider, MD   spironolactone (ALDACTONE) 25 MG tablet Take 25 mg by mouth daily   Yes Historical Provider, MD   warfarin (COUMADIN) 1 MG tablet Take 5.5 mg (1 of the 5 mg tablet + 1/2 of the 1 mg tablet) daily or as directed by Coumadin Clinic. 12/2/20  Yes Ubaldo Mullen MD   metoprolol succinate (TOPROL XL) 25 MG extended release tablet Take 75 mg by mouth 2 times daily   Yes Historical Provider, MD   furosemide (LASIX) 40 MG tablet Take 40 mg by mouth daily   Yes Historical Provider, MD   atorvastatin (LIPITOR) 40 MG tablet Take 40 mg by mouth daily   Yes Historical Provider, MD   potassium chloride (KLOR-CON) 20 MEQ packet Take 20 mEq by mouth daily   Yes Historical Provider, MD   Malik Layne [address] [facility] by Does not apply route For 5 years 11/30/20   Ubaldo Mullen MD   warfarin (COUMADIN) 5 MG tablet Take 1 tablet by mouth daily Indications: Taking 5.5 Mg DAILY 11/25/20   Ubaldo Mullen MD        Allergies:     Patient has no known allergies. Social History:     Tobacco:    reports that she quit smoking about 14 years ago. Her smoking use included cigarettes. She has a 25.00 pack-year smoking history. She has never used smokeless tobacco.  Alcohol:      reports previous alcohol use. Drug Use:  reports previous drug use. Family History:     Family History   Problem Relation Age of Onset    Heart Disease Father     Colon Cancer Brother     Heart Attack Mother        Review of Systems:     Positive and Negative as described in HPI. Review of Systems   Constitutional: Negative for chills, diaphoresis and fever. HENT: Negative for congestion and hearing loss. Respiratory: Positive for cough (occasional chronic cough) and shortness of breath (chronic).  Negative for wheezing and (from the past 24 hour(s))   Troponin    Collection Time: 05/04/21 12:24 AM   Result Value Ref Range    Troponin, High Sensitivity 31 (H) 0 - 14 ng/L    Troponin T NOT REPORTED <0.03 ng/mL    Troponin Interp NOT REPORTED    Troponin    Collection Time: 05/04/21  4:17 AM   Result Value Ref Range    Troponin, High Sensitivity 26 (H) 0 - 14 ng/L    Troponin T NOT REPORTED <0.03 ng/mL    Troponin Interp NOT REPORTED    Comprehensive Metabolic Panel w/ Reflex to MG    Collection Time: 05/04/21  5:54 AM   Result Value Ref Range    Glucose 109 (H) 70 - 99 mg/dL    BUN 14 6 - 20 mg/dL    CREATININE 0.66 0.50 - 0.90 mg/dL    Bun/Cre Ratio NOT REPORTED 9 - 20    Calcium 8.8 8.6 - 10.4 mg/dL    Sodium 136 135 - 144 mmol/L    Potassium 4.1 3.7 - 5.3 mmol/L    Chloride 104 98 - 107 mmol/L    CO2 21 20 - 31 mmol/L    Anion Gap 11 9 - 17 mmol/L    Alkaline Phosphatase 105 (H) 35 - 104 U/L    ALT 43 (H) 5 - 33 U/L    AST 56 (H) <32 U/L    Total Bilirubin 0.52 0.3 - 1.2 mg/dL    Total Protein 6.5 6.4 - 8.3 g/dL    Albumin 3.7 3.5 - 5.2 g/dL    Albumin/Globulin Ratio 1.3 1.0 - 2.5    GFR Non-African American >60 >60 mL/min    GFR African American >60 >60 mL/min    GFR Comment          GFR Staging NOT REPORTED    Magnesium    Collection Time: 05/04/21  5:54 AM   Result Value Ref Range    Magnesium 2.1 1.6 - 2.6 mg/dL   CBC    Collection Time: 05/04/21  5:54 AM   Result Value Ref Range    WBC 7.0 3.5 - 11.0 k/uL    RBC 4.60 4.0 - 5.2 m/uL    Hemoglobin 14.2 12.0 - 16.0 g/dL    Hematocrit 42.7 36 - 46 %    MCV 92.8 80 - 100 fL    MCH 30.9 26 - 34 pg    MCHC 33.3 31 - 37 g/dL    RDW 13.8 12.5 - 15.4 %    Platelets 483 611 - 521 k/uL    MPV 8.3 6.0 - 12.0 fL    NRBC Automated NOT REPORTED per 100 WBC   EKG 12 lead    Collection Time: 05/04/21  5:59 AM   Result Value Ref Range    Ventricular Rate 75 BPM    Atrial Rate 75 BPM    P-R Interval 164 ms    QRS Duration 136 ms    Q-T Interval 458 ms    QTc Calculation (Bazett) 511 ms    P Axis 26 degrees    R Axis -57 degrees    T Axis 61 degrees       Imaging/Diagnostics:    Xr Chest Portable    Result Date: 5/3/2021  Status post left-sided ppm/AICD with satisfactory electrode lead positioning and no pneumothorax. Assessment :      Hospital Problems           Last Modified POA    * (Principal) AICD malfunction, initial encounter 5/4/2021 Yes    Hx of deep venous thrombosis 5/4/2021 Yes    S/P CABG (coronary artery bypass graft) 5/4/2021 Yes    Gastroesophageal reflux disease 5/4/2021 Yes    Chronic congestive heart failure ([address] 5/4/2021 Yes    Essential hypertension 5/4/2021 Yes    History of aorto-femoral bypass 5/4/2021 Yes    S/P internal cardiac defibrillator procedure 5/4/2021 Yes    Anticoagulated on Coumadin 5/4/2021 Yes          Plan:     Patient status observation in the Med/Surge    AICD malfunction; order to interrogate pacer. Cardiology consulted    Home Entresto, Aldactone, Toprol-XL, Lasix, Lipitor, potassium resumed. Hold Lovenox pending cardiology recommendation.     Consultations:   STARR Batres - CNP  5/4/2021  9:24 AM    Copy sent to Dr. Angela Barron MD

## 2021-05-04 NOTE — CARE COORDINATION
Case Management Initial Discharge Plan  Earlene Hickman,             Met with:patient to discuss discharge plans. Information verified: address, contacts, phone number, , insurance Yes    Emergency Contact/Next of Kin name & number: Ray gallagher [phone]    PCP: Javier Baez MD  Date of last visit: past year    Insurance Provider: Zachary    Discharge Planning    Living Arrangements:  Parent   Support Systems:  Family Members, Parent    Home has 2 stories  4 stairs to climb to get into front door, 1 flight stairs to climb to reach second floor  Location of bedroom/bathroom in home 2    Patient able to perform ADL's:Independent    Current Services (outpatient & in home) none  DME equipment:   DME provider:     Receiving oral anticoagulation therapy? Yes    If indicated:   Physician managing anticoagulation treatment: PCP  Where does patient obtain lab work for ATC treatment? [facility]      Potential Assistance Needed:  N/A    Patient agreeable to home care: No  [place] of choice provided:  no    Prior SNF/Rehab Placement and Facility: no  Agreeable to SNF/Rehab: No  [place] of choice provided: no     Evaluation: no    Expected Discharge date:  21    Patient expects to be discharged to:  Home  Follow Up Appointment: Best Day/ Time: Monday AM    Transportation provider: mother  Transportation arrangements needed for discharge: No    Readmission Risk              Risk of Unplanned Readmission:        0             Does patient have a readmission risk score greater than 14?: No  If yes, follow-up appointment must be made within 7 days of discharge.      Goals of Care: make sure pacer is working appropriately      Discharge Plan: home with mom, independent - pacer/ AICD placed yesterday - need interrogated          Electronically signed by Minal Barney RN on 21 at 10:28 AM EDT

## 2021-05-04 NOTE — CONSULTS
[facility]    Consult                        Today's Date: 5/4/2021  Patient Name: Sammy Mak  Date of admission: 5/3/2021  9:47 PM  Patient's age: 53 y.o., 1970  Admission Dx: AICD malfunction, initial encounter [T82.118A]    Reason for Consult:  Cardiac evaluation    Requesting Physician: Brittanie Reilly MD    CHIEF COMPLAINT:  Pulsating device     History Obtained From:  patient, electronic medical record    HISTORY OF PRESENT ILLNESS:      The patient is a 53 y.o.  female who is admitted to the hospital for \"pulsating device\". Pt had AICD implanted yesterday per Dr. Lilian Mendez. Pt did have some diaphramatic stimulation at 4 Volts which was adjusted yesterday. Pt denies any CP or SOB. She states that it occurred yesterday when she was at home watching TV. Site intact today. + staples. Slight hematoma at site. Past Medical History:   has a past medical history of CAD (coronary artery disease), GERD (gastroesophageal reflux disease), Hyperlipidemia, Hypertension, and Prediabetes. Past Surgical History:   has a past surgical history that includes Coronary artery bypass graft (2004); shoulder surgery (Right, 1994); eye surgery (1990); Tonsillectomy; and Cardiac defibrillator placement (05/03/2021). Home Medications:    Prior to Admission medications    Medication Sig Start Date End Date Taking?  Authorizing Provider   enoxaparin (LOVENOX) 100 MG/ML injection Inject 1 mL into the skin 2 times daily 4/28/21  Yes Krista Garcia MD   vitamin D (ERGOCALCIFEROL) 1.25 MG (78321 UT) CAPS capsule Take 1 capsule by mouth once a week 3/12/21  Yes Krista Garcia MD   empagliflozin (JARDIANCE) 10 MG tablet Take 1 tablet by mouth daily 3/12/21  Yes Krista Garcia MD   sacubitril-valsartan (ENTRESTO) 24-26 MG per tablet Take 1 tablet by mouth 2 times daily   Yes Historical Provider, MD   spironolactone (ALDACTONE) 25 MG tablet Take 25 mg by mouth daily   Yes Historical Provider, MD   warfarin (COUMADIN) 1 MG tablet Take 5.5 mg (1 of the 5 mg tablet + 1/2 of the 1 mg tablet) daily or as directed by Coumadin Clinic. 12/2/20  Yes Javier Baez MD   metoprolol succinate (TOPROL XL) 25 MG extended release tablet Take 75 mg by mouth 2 times daily   Yes Historical Provider, MD   furosemide (LASIX) 40 MG tablet Take 40 mg by mouth daily   Yes Historical Provider, MD   atorvastatin (LIPITOR) 40 MG tablet Take 40 mg by mouth daily   Yes Historical Provider, MD   potassium chloride (KLOR-CON) 20 MEQ packet Take 20 mEq by mouth daily   Yes Historical Provider, MD   Malik Layne [address] [facility] by Does not apply route For 5 years 11/30/20   Javier Baez MD   warfarin (COUMADIN) 5 MG tablet Take 1 tablet by mouth daily Indications: Taking 5.5 Mg DAILY 11/25/20   Javier Baez MD       Allergies:  Patient has no known allergies. Social History:   reports that she quit smoking about 14 years ago. Her smoking use included cigarettes. She has a 25.00 pack-year smoking history. She has never used smokeless tobacco. She reports previous alcohol use. She reports previous drug use. Family History: family history includes Colon Cancer in her brother; Heart Attack in her mother; Heart Disease in her father. No h/o sudden cardiac death. No for premature CAD    REVIEW OF SYSTEMS:    Constitutional: there has been no unanticipated weight loss. There's been No change in energy level, No change in activity level. Eyes: No visual changes or diplopia. No scleral icterus. ENT: No Headaches, hearing loss or vertigo. No mouth sores or sore throat. Cardiovascular: see above  Respiratory: see above  Gastrointestinal: No abdominal pain, appetite loss, blood in stools. Genitourinary: No dysuria, trouble voiding, or hematuria. Musculoskeletal:  No gait disturbance, No weakness or joint complaints. Integumentary: No rash or pruritis. Neurological: No headache or diplopia.  No 7. 0   HGB 14.2   HCT 42.7        BMP:   Recent Labs     05/03/21  0806 05/04/21  0554   NA  --  136   K  --  4.1   CO2  --  21   BUN  --  14   CREATININE 1.06 0.66   LABGLOM 55* >60   GLUCOSE  --  109*     BNP: No results for input(s): BNP in the last 72 hours. PT/INR:   Recent Labs     05/03/21  0807   PROTIME 11.8   INR 1.0     APTT:No results for input(s): APTT in the last 72 hours. CARDIAC ENZYMES:No results for input(s): CKTOTAL, CKMB, CKMBINDEX, TROPONINI in the last 72 hours. FASTING LIPID PANEL:  Lab Results   Component Value Date    HDL 37 03/11/2021    TRIG 85 03/11/2021     LIVER PROFILE:  Recent Labs     05/04/21  0554   AST 56*   ALT 43*   LABALBU 3.7       IMPRESSION:    Patient Active Problem List   Diagnosis    Hx of deep venous thrombosis    S/P CABG (coronary artery bypass graft)    Coronary artery disease involving native coronary artery of native heart without angina pectoris    Gastroesophageal reflux disease    Chronic congestive heart failure (HCC)    Essential hypertension    Prediabetes    History of aorto-femoral bypass    S/P internal cardiac defibrillator procedure    AICD malfunction, initial encounter    Anticoagulated on Coumadin       RECOMMENDATIONS:  Nonischemic cardiomyopathy with EF 15% and LBBB. S/p CRTD yesterday. Will proceed with CXR today and [employer] contacted for interrogation. Slight swelling at site. Continue Ice pack. Staples intact. Hx of DVT. Ok to resume Coumadin tonight. Follows at coumadin clinic   Continue ASA, statin, lasx, BB, entresto and aldactone   Plan for 1 week wound check. Possible DC later today. Discussed with patient and Nurse. Betty Guerra, [address]  Cardiology Consult           [phone]    Attending Cardiologist Addendum: I have reviewed and performed the history, physical, subjective, objective, assessment, and plan with the resident/fellow/NP and agree with the note.  I performed the history and physical personally. I have made changes to the note above as needed. Thank you for allowing me to participate in the care of this patient, please do not hesitate to call if you have any questions. Gisele Pimentel, [address] [facility]  [facility]  [phone]

## 2021-05-05 LAB
EKG ATRIAL RATE: 75 BPM
EKG P AXIS: 26 DEGREES
EKG P-R INTERVAL: 164 MS
EKG Q-T INTERVAL: 458 MS
EKG QRS DURATION: 136 MS
EKG QTC CALCULATION (BAZETT): 511 MS
EKG R AXIS: -57 DEGREES
EKG T AXIS: 61 DEGREES
EKG VENTRICULAR RATE: 75 BPM

## 2021-05-07 LAB
EKG ATRIAL RATE: 103 BPM
EKG P AXIS: 77 DEGREES
EKG P-R INTERVAL: 146 MS
EKG Q-T INTERVAL: 400 MS
EKG QRS DURATION: 146 MS
EKG QTC CALCULATION (BAZETT): 524 MS
EKG R AXIS: -101 DEGREES
EKG T AXIS: 73 DEGREES
EKG VENTRICULAR RATE: 103 BPM

## 2021-05-10 ENCOUNTER — HOSPITAL ENCOUNTER (OUTPATIENT)
Dept: PHARMACY | Age: 51
Setting detail: THERAPIES SERIES
Discharge: HOME | End: 2021-05-10
Payer: COMMERCIAL

## 2021-05-10 DIAGNOSIS — Z86.718 HX OF DEEP VENOUS THROMBOSIS: ICD-10-CM

## 2021-05-10 LAB
INR BLD: 2.1
PROTIME: 25.2 SECONDS

## 2021-05-10 PROCEDURE — 99211 OFF/OP EST MAY X REQ PHY/QHP: CPT

## 2021-05-10 PROCEDURE — 85610 PROTHROMBIN TIME: CPT

## 2021-05-18 RX ORDER — EMPAGLIFLOZIN 10 MG/1
TABLET, FILM COATED ORAL
Qty: 30 TABLET | Refills: 0 | Status: SHIPPED | OUTPATIENT
Start: 2021-05-18 | End: 2021-06-14

## 2021-05-24 ENCOUNTER — HOSPITAL ENCOUNTER (OUTPATIENT)
Dept: PHARMACY | Age: 51
Setting detail: THERAPIES SERIES
Discharge: HOME | End: 2021-05-24
Payer: COMMERCIAL

## 2021-05-24 LAB
INR BLD: 3
PROTIME: 36.1 SECONDS

## 2021-05-24 PROCEDURE — 99211 OFF/OP EST MAY X REQ PHY/QHP: CPT

## 2021-05-24 PROCEDURE — 85610 PROTHROMBIN TIME: CPT

## 2021-06-14 ENCOUNTER — HOSPITAL ENCOUNTER (OUTPATIENT)
Dept: PHARMACY | Age: 51
Setting detail: THERAPIES SERIES
Discharge: HOME | End: 2021-06-14
Payer: COMMERCIAL

## 2021-06-14 DIAGNOSIS — Z86.718 HX OF DEEP VENOUS THROMBOSIS: Primary | ICD-10-CM

## 2021-06-14 LAB
INR BLD: 3.4
PROTIME: 41.1 SECONDS

## 2021-06-14 PROCEDURE — 85610 PROTHROMBIN TIME: CPT

## 2021-06-14 PROCEDURE — 99213 OFFICE O/P EST LOW 20 MIN: CPT

## 2021-06-14 RX ORDER — WARFARIN SODIUM 5 MG/1
TABLET ORAL
Qty: 90 TABLET | Refills: 1 | Status: SHIPPED | OUTPATIENT
Start: 2021-06-14 | End: 2021-09-01 | Stop reason: SDUPTHER

## 2021-06-14 RX ORDER — EMPAGLIFLOZIN 10 MG/1
TABLET, FILM COATED ORAL
Qty: 30 TABLET | Refills: 2 | Status: SHIPPED | OUTPATIENT
Start: 2021-06-14 | End: 2021-09-07 | Stop reason: SDUPTHER

## 2021-06-28 ENCOUNTER — HOSPITAL ENCOUNTER (OUTPATIENT)
Dept: PHARMACY | Age: 51
Setting detail: THERAPIES SERIES
Discharge: HOME | End: 2021-06-28
Payer: COMMERCIAL

## 2021-06-28 DIAGNOSIS — Z86.718 HX OF DEEP VENOUS THROMBOSIS: Primary | ICD-10-CM

## 2021-06-28 LAB
INR BLD: 3.3
PROTIME: 39.4 SECONDS

## 2021-06-28 PROCEDURE — 99212 OFFICE O/P EST SF 10 MIN: CPT

## 2021-06-28 PROCEDURE — 85610 PROTHROMBIN TIME: CPT

## 2021-06-28 NOTE — PROGRESS NOTES
Medication Management Service, Warfarin Management  [facility] Medication Management, [phone]  Visit Date: 6/28/2021   Subjective:   Ananya Floyd is a 53 y.o. female who presents to clinic today for anticoagulation monitoring and adjustment. Patient was referred for warfarin management due to  Indication:   DVT. INR goal: of 2.0-3.0. Duration of therapy: . Patient reports the following:   Adherent with regimen:  Yes  Missed or extra doses:  None   Bleeding or thromboembolic side effects:  None  Significant medication, dietary, alcohol, or tobacco changes: The patient reports she had a water pill stopped and her metoprolol dose was decreased. The patient reports she has been more active lately. Significant recent illness, disease state changes, or hospitalization:  None  Upcoming surgeries or procedures:  None           Assessment and PLAN   PT/INR done in office per protocol. INR today is 3.3, supratherapeutic. Plan:  Instructed the patient to start a decreased warfarin regimen of 5 mg Sun, Wed, Fri and 2.5 mg on all other days. Using warfarin 5 mg tablets. Recheck INR in 2 week(s). Patient verbalized understanding of dosing directions and information discussed. Dosing schedule given to patient. Progress note sent to referring office. Patient acknowledges working in consult agreement with pharmacist as referred by his/her physician.       Electronically signed by CAR [facility] on 6/28/21 at 10:34 AM EDT    For Pharmacy Admin Tracking Only     Intervention Detail: Dose Adjustment: 1, reason: Therapy Optimization   Total # of Interventions Recommended: 1   Total # of Interventions Accepted: 1   Time Spent (min): 15

## 2021-07-12 ENCOUNTER — HOSPITAL ENCOUNTER (OUTPATIENT)
Dept: PHARMACY | Age: 51
Setting detail: THERAPIES SERIES
Discharge: HOME | End: 2021-07-12
Payer: COMMERCIAL

## 2021-07-12 DIAGNOSIS — Z86.718 HX OF DEEP VENOUS THROMBOSIS: Primary | ICD-10-CM

## 2021-07-12 LAB
INR BLD: 1.9
PROTIME: 22.9 SECONDS

## 2021-07-12 PROCEDURE — 85610 PROTHROMBIN TIME: CPT

## 2021-07-12 PROCEDURE — 99212 OFFICE O/P EST SF 10 MIN: CPT

## 2021-07-12 NOTE — PROGRESS NOTES
Medication Management Service, Warfarin Management  [facility] Medication Management, [phone]  Visit Date: 7/12/2021   Subjective:   Ronna Cortes is a 49 y.o. female who presents to clinic today for anticoagulation monitoring and adjustment. Patient was referred for warfarin management due to  Indication:   DVT. INR goal: of 2.0-3.0. Duration of therapy: . Patient reports the following:   Adherent with regimen:  Yes  Missed or extra doses:  May have missed a dose 7/3/21. Bleeding or thromboembolic side effects:  None  Significant medication, dietary, alcohol, or tobacco changes:  None  Significant recent illness, disease state changes, or hospitalization:  None  Upcoming surgeries or procedures:  None           Assessment and PLAN   PT/INR done in office per protocol. INR today is 1.9, subtherapeutic. Plan:  Instructed the patient to take an increased dose of 5 mg today, then continue with the current warfarin regimen of 5 mg Sun, Wed, Fri and 2.5 mg on all other days. Using warfarin 5 mg tablets. Recheck INR in 2 week(s). Patient verbalized understanding of dosing directions and information discussed. Dosing schedule given to patient. Progress note sent to referring office. Patient acknowledges working in consult agreement with pharmacist as referred by his/her physician.       Electronically signed by CAR [facility] on 7/12/21 at 10:43 AM EDT    For Pharmacy Admin Tracking Only     Intervention Detail: Dose Adjustment: 1, reason: Therapy Optimization   Total # of Interventions Recommended: 1   Total # of Interventions Accepted: 1   Time Spent (min): 15

## 2021-07-26 ENCOUNTER — HOSPITAL ENCOUNTER (OUTPATIENT)
Dept: PHARMACY | Age: 51
Setting detail: THERAPIES SERIES
Discharge: HOME | End: 2021-07-26
Payer: COMMERCIAL

## 2021-07-26 DIAGNOSIS — Z86.718 HX OF DEEP VENOUS THROMBOSIS: Primary | ICD-10-CM

## 2021-07-26 LAB
INR BLD: 2.2
PROTIME: 25.8 SECONDS

## 2021-07-26 PROCEDURE — 85610 PROTHROMBIN TIME: CPT

## 2021-07-26 PROCEDURE — 99211 OFF/OP EST MAY X REQ PHY/QHP: CPT

## 2021-08-12 RX ORDER — ERGOCALCIFEROL 1.25 MG/1
50000 CAPSULE ORAL WEEKLY
Qty: 12 CAPSULE | Refills: 1 | Status: SHIPPED | OUTPATIENT
Start: 2021-08-12 | End: 2022-01-31

## 2021-08-23 ENCOUNTER — HOSPITAL ENCOUNTER (OUTPATIENT)
Dept: PHARMACY | Age: 51
Setting detail: THERAPIES SERIES
Discharge: HOME | End: 2021-08-23
Payer: COMMERCIAL

## 2021-08-23 DIAGNOSIS — Z86.718 HX OF DEEP VENOUS THROMBOSIS: Primary | ICD-10-CM

## 2021-08-23 LAB
INR BLD: 2.1
PROTIME: 25.4 SECONDS

## 2021-08-23 PROCEDURE — 85610 PROTHROMBIN TIME: CPT

## 2021-08-23 PROCEDURE — 99211 OFF/OP EST MAY X REQ PHY/QHP: CPT

## 2021-08-23 NOTE — PROGRESS NOTES
Medication Management Service, Warfarin Management  [facility] Medication Management, [phone]  Visit Date: 8/23/2021   Subjective:   Joanna Oneal is a 49 y.o. female who presents to clinic today for anticoagulation monitoring and adjustment. Patient was referred for warfarin management due to  Indication:   DVT. INR goal: of 2.0-3.0. Duration of therapy: indefinite. Patient reports the following:   Adherent with regimen:  Yes  Missed or extra doses:  None   Bleeding or thromboembolic side effects:  None  Significant medication, dietary, alcohol, or tobacco changes: The patient reports her dose of metoprolol was decreased from 75 mg to 50 mg. Significant recent illness, disease state changes, or hospitalization:  None  Upcoming surgeries or procedures:  None           Assessment and PLAN   PT/INR done in office per protocol. INR today is 2.1, therapeutic. Plan:  Instructed the patient to continue the current warfarin regimen of 5 mg Sun, Wed, Fri and 2.5 mg on all other days. Using warfarin 5 mg tablets. Recheck INR in 4 week(s). Patient verbalized understanding of dosing directions and information discussed. Dosing schedule given to patient. Progress note sent to referring office. Patient acknowledges working in consult agreement with pharmacist as referred by his/her physician.       Electronically signed by Tamiko Moreland, [facility] on 8/23/21 at 10:39 AM EDT    For Pharmacy Admin Tracking Only     Intervention Detail:    Total # of Interventions Recommended: 0   Total # of Interventions Accepted: 0   Time Spent (min): 20

## 2021-09-01 ENCOUNTER — OFFICE VISIT (OUTPATIENT)
Dept: PRIMARY CARE CLINIC | Age: 51
End: 2021-09-01
Payer: COMMERCIAL

## 2021-09-01 VITALS
RESPIRATION RATE: 18 BRPM | OXYGEN SATURATION: 99 % | BODY MASS INDEX: 36.01 KG/M2 | DIASTOLIC BLOOD PRESSURE: 64 MMHG | SYSTOLIC BLOOD PRESSURE: 102 MMHG | WEIGHT: 236.8 LBS | HEART RATE: 77 BPM

## 2021-09-01 DIAGNOSIS — Z12.11 ENCOUNTER FOR COLORECTAL CANCER SCREENING: ICD-10-CM

## 2021-09-01 DIAGNOSIS — Z95.810 S/P INTERNAL CARDIAC DEFIBRILLATOR PROCEDURE: ICD-10-CM

## 2021-09-01 DIAGNOSIS — Z87.891 PERSONAL HISTORY OF TOBACCO USE: ICD-10-CM

## 2021-09-01 DIAGNOSIS — I50.22 CHRONIC SYSTOLIC CONGESTIVE HEART FAILURE (HCC): ICD-10-CM

## 2021-09-01 DIAGNOSIS — Z12.31 ENCOUNTER FOR SCREENING MAMMOGRAM FOR BREAST CANCER: ICD-10-CM

## 2021-09-01 DIAGNOSIS — Z12.12 ENCOUNTER FOR COLORECTAL CANCER SCREENING: ICD-10-CM

## 2021-09-01 DIAGNOSIS — I10 ESSENTIAL HYPERTENSION: Primary | ICD-10-CM

## 2021-09-01 DIAGNOSIS — Z95.1 S/P CABG (CORONARY ARTERY BYPASS GRAFT): ICD-10-CM

## 2021-09-01 DIAGNOSIS — E55.9 VITAMIN D DEFICIENCY: ICD-10-CM

## 2021-09-01 DIAGNOSIS — E66.01 CLASS 2 SEVERE OBESITY DUE TO EXCESS CALORIES WITH SERIOUS COMORBIDITY AND BODY MASS INDEX (BMI) OF 36.0 TO 36.9 IN ADULT (HCC): ICD-10-CM

## 2021-09-01 DIAGNOSIS — R73.03 PREDIABETES: ICD-10-CM

## 2021-09-01 PROBLEM — T82.118A AICD MALFUNCTION, INITIAL ENCOUNTER: Status: RESOLVED | Noted: 2021-05-03 | Resolved: 2021-09-01

## 2021-09-01 PROCEDURE — 99213 OFFICE O/P EST LOW 20 MIN: CPT | Performed by: STUDENT IN AN ORGANIZED HEALTH CARE EDUCATION/TRAINING PROGRAM

## 2021-09-01 PROCEDURE — 1036F TOBACCO NON-USER: CPT | Performed by: STUDENT IN AN ORGANIZED HEALTH CARE EDUCATION/TRAINING PROGRAM

## 2021-09-01 PROCEDURE — 3017F COLORECTAL CA SCREEN DOC REV: CPT | Performed by: STUDENT IN AN ORGANIZED HEALTH CARE EDUCATION/TRAINING PROGRAM

## 2021-09-01 PROCEDURE — G8417 CALC BMI ABV UP PARAM F/U: HCPCS | Performed by: STUDENT IN AN ORGANIZED HEALTH CARE EDUCATION/TRAINING PROGRAM

## 2021-09-01 PROCEDURE — G8427 DOCREV CUR MEDS BY ELIG CLIN: HCPCS | Performed by: STUDENT IN AN ORGANIZED HEALTH CARE EDUCATION/TRAINING PROGRAM

## 2021-09-01 PROCEDURE — G0296 VISIT TO DETERM LDCT ELIG: HCPCS | Performed by: STUDENT IN AN ORGANIZED HEALTH CARE EDUCATION/TRAINING PROGRAM

## 2021-09-01 SDOH — ECONOMIC STABILITY: FOOD INSECURITY: WITHIN THE PAST 12 MONTHS, YOU WORRIED THAT YOUR FOOD WOULD RUN OUT BEFORE YOU GOT MONEY TO BUY MORE.: NEVER TRUE

## 2021-09-01 SDOH — ECONOMIC STABILITY: FOOD INSECURITY: WITHIN THE PAST 12 MONTHS, THE FOOD YOU BOUGHT JUST DIDN'T LAST AND YOU DIDN'T HAVE MONEY TO GET MORE.: NEVER TRUE

## 2021-09-01 ASSESSMENT — ENCOUNTER SYMPTOMS
BACK PAIN: 0
ABDOMINAL PAIN: 0
EYE ITCHING: 0
SHORTNESS OF BREATH: 0
WHEEZING: 0
ABDOMINAL DISTENTION: 0
EYE DISCHARGE: 0
COUGH: 0
RHINORRHEA: 0

## 2021-09-01 ASSESSMENT — PATIENT HEALTH QUESTIONNAIRE - PHQ9
SUM OF ALL RESPONSES TO PHQ QUESTIONS 1-9: 0
2. FEELING DOWN, DEPRESSED OR HOPELESS: 0
SUM OF ALL RESPONSES TO PHQ9 QUESTIONS 1 & 2: 0
1. LITTLE INTEREST OR PLEASURE IN DOING THINGS: 0
SUM OF ALL RESPONSES TO PHQ QUESTIONS 1-9: 0
SUM OF ALL RESPONSES TO PHQ QUESTIONS 1-9: 0

## 2021-09-01 ASSESSMENT — SOCIAL DETERMINANTS OF HEALTH (SDOH): HOW HARD IS IT FOR YOU TO PAY FOR THE VERY BASICS LIKE FOOD, HOUSING, MEDICAL CARE, AND HEATING?: NOT VERY HARD

## 2021-09-01 NOTE — PROGRESS NOTES
Risks associated with radiation from annual LDCT- Radiation exposure is about the same as for a mammogram, which is about 1/3 of the annual background radiation exposure from everyday life. Starting screening at age 54 is not likely to increase cancer risk from radiation exposure. Patients with comorbidities resulting in life expectancy of < 10 years, or that would preclude treatment of an abnormality identified on CT, should not be screened due to lack of benefit.     To obtain maximal benefit from this screening, smoking cessation and long-term abstinence from smoking is critical              Hemoglobin A1C (%)   Date Value   2021 6.4 (H)             ( goal A1C is < 7)   No results found for: LABMICR  LDL Cholesterol (mg/dL)   Date Value   2021 120   2021 154 (H)       (goal LDL is <100)   AST (U/L)   Date Value   2021 56 (H)     ALT (U/L)   Date Value   2021 43 (H)     BUN (mg/dL)   Date Value   2021 14     BP Readings from Last 3 Encounters:   21 102/64   21 97/66   21 99/70          (goal 120/80)    Past Medical History:   Diagnosis Date    CAD (coronary artery disease)     GERD (gastroesophageal reflux disease)     Hyperlipidemia     Hypertension     Prediabetes       Past Surgical History:   Procedure Laterality Date    CARDIAC DEFIBRILLATOR PLACEMENT  2021    CORONARY ARTERY BYPASS GRAFT      EYE SURGERY  1990    Eye Socket    SHOULDER SURGERY Right 1994    rods and pins placed    TONSILLECTOMY         Family History   Problem Relation Age of Onset    Heart Disease Father     Colon Cancer Brother     Heart Attack Mother        Social History     Tobacco Use    Smoking status: Former Smoker     Packs/day: 1.00     Years: 25.00     Pack years: 25.00     Types: Cigarettes     Quit date: 2007     Years since quittin.6    Smokeless tobacco: Never Used   Substance Use Topics    Alcohol use: Not Currently      Current Outpatient Medications   Medication Sig Dispense Refill    vitamin D (ERGOCALCIFEROL) 1.25 MG (28716 UT) CAPS capsule Take 1 capsule by mouth once a week 12 capsule 1    JARDIANCE 10 MG tablet Take 1 tablet by mouth once daily 30 tablet 2    sacubitril-valsartan (ENTRESTO) 24-26 MG per tablet Take 1 tablet by mouth 2 times daily      warfarin (COUMADIN) 1 MG tablet Take 5.5 mg (1 of the 5 mg tablet + 1/2 of the 1 mg tablet) daily or as directed by Coumadin Clinic. 45 tablet 1    Handicap Placard MISC by Does not apply route For 5 years 1 each 0    metoprolol succinate (TOPROL XL) 25 MG extended release tablet Take 75 mg by mouth 2 times daily      furosemide (LASIX) 40 MG tablet Take 40 mg by mouth daily      atorvastatin (LIPITOR) 40 MG tablet Take 40 mg by mouth daily      potassium chloride (KLOR-CON) 20 MEQ packet Take 20 mEq by mouth daily       No current facility-administered medications for this visit. No Known Allergies    Health Maintenance   Topic Date Due    Hepatitis C screen  Never done    Pneumococcal 0-64 years Vaccine (1 of 2 - PPSV23) Never done    HIV screen  Never done    Cervical cancer screen  Never done    Colon cancer screen colonoscopy  Never done    Breast cancer screen  Never done    Shingles Vaccine (1 of 2) Never done    Low dose CT lung screening  Never done    Flu vaccine (1) 09/01/2021    DTaP/Tdap/Td vaccine (1 - Tdap) 11/25/2021 (Originally 7/3/1990)    A1C test (Diabetic or Prediabetic)  03/11/2022    Lipid screen  05/04/2022    Potassium monitoring  05/04/2022    Creatinine monitoring  05/04/2022    COVID-19 Vaccine  Completed    Hepatitis A vaccine  Aged Out    Hepatitis B vaccine  Aged Out    Hib vaccine  Aged Out    Meningococcal (ACWY) vaccine  Aged Out       Subjective:      Review of Systems   Constitutional: Negative for chills, fatigue and fever. HENT: Negative for congestion, hearing loss and rhinorrhea.     Eyes: Negative for discharge Future    3. Encounter for screening mammogram for breast cancer    - STACIA DIGITAL SCREEN W OR WO CAD BILATERAL; Future    4. Personal history of tobacco use    - ID VISIT TO DISCUSS LUNG CA SCREEN W LDCT  - CT Lung Screen (Annual); Future    5. Chronic systolic congestive heart failure (HCC)  On Entresto, Toprol-XL. Follows up with cardiology. 6. S/P CABG (coronary artery bypass graft)  On Entresto, Toprol-XL, Lasix and Lipitor. Follows up with cardiology. 7. Prediabetes  On Jardiance    8. S/P internal cardiac defibrillator procedure  Follows up with cardiology    9. Vitamin D deficiency  Continued vitamin D supplementation. Diagnosis Orders   1. Essential hypertension     2. Encounter for colorectal cancer screening  Cologuard (For External Results Only)   3. Encounter for screening mammogram for breast cancer  STACIA DIGITAL SCREEN W OR WO CAD BILATERAL   4. Personal history of tobacco use  ID VISIT TO DISCUSS LUNG CA SCREEN W LDCT    CT Lung Screen (Annual)   5. Chronic systolic congestive heart failure ([address]     6. S/P CABG (coronary artery bypass graft)     7. Prediabetes     8. S/P internal cardiac defibrillator procedure     9. Vitamin D deficiency     10. Class 2 severe obesity due to excess calories with serious comorbidity and body mass index (BMI) of 36.0 to 36.9 in adult [facility])             Plan:      Return in about 3 months (around 12/1/2021). Orders Placed This Encounter   Procedures    Cologuard (For External Results Only)     This test is performed by an external laboratory and is used for result attachment only. It is required that this order requisition be faxed to: [facility] @ [phone]. See [email or web address] for further information.      Standing Status:   Future     Standing Expiration Date:   9/1/2022    STACIA DIGITAL SCREEN W OR WO CAD BILATERAL     Standing Status:   Future     Standing Expiration Date:   11/1/2022     Order Specific Question:   Reason for

## 2021-09-07 RX ORDER — EMPAGLIFLOZIN 10 MG/1
TABLET, FILM COATED ORAL
Qty: 90 TABLET | Refills: 3 | Status: SHIPPED | OUTPATIENT
Start: 2021-09-07 | End: 2022-05-03 | Stop reason: SDUPTHER

## 2021-09-20 ENCOUNTER — HOSPITAL ENCOUNTER (OUTPATIENT)
Dept: PHARMACY | Age: 51
Setting detail: THERAPIES SERIES
Discharge: HOME | End: 2021-09-20
Payer: COMMERCIAL

## 2021-09-20 DIAGNOSIS — Z86.718 HX OF DEEP VENOUS THROMBOSIS: Primary | ICD-10-CM

## 2021-09-20 LAB
INR BLD: 2.1
PROTIME: 24.9 SECONDS

## 2021-09-20 PROCEDURE — 99211 OFF/OP EST MAY X REQ PHY/QHP: CPT

## 2021-09-20 PROCEDURE — 85610 PROTHROMBIN TIME: CPT

## 2021-09-20 NOTE — PROGRESS NOTES
Medication Management Service, Warfarin Management  [facility] Medication Management, [phone]  Visit Date: 9/20/2021   Subjective:   Florencia Marr is a 49 y.o. female who presents to clinic today for anticoagulation monitoring and adjustment. Patient was referred for warfarin management due to  Indication:   DVT. INR goal: of 2.0-3.0. Duration of therapy: indefinite. Patient reports the following:   Adherent with regimen:  Yes  Missed or extra doses:  None   Bleeding or thromboembolic side effects:  None  Significant medication, dietary, alcohol, or tobacco changes:  None  Significant recent illness, disease state changes, or hospitalization:  None  Upcoming surgeries or procedures:  None           Assessment and PLAN   PT/INR done in office per protocol. INR today is 2.1, therapeutic. Plan:  Instructed the patient to continue the current warfarin regimen of 5 mg Sun, Wed, Fri and 2.5 mg on all other days. Using warfarin 5 mg tablets. Recheck INR in 5 week(s). Patient verbalized understanding of dosing directions and information discussed. Dosing schedule given to patient. Progress note sent to referring office. Patient acknowledges working in consult agreement with pharmacist as referred by his/her physician.       Electronically signed by CAR [facility] on 9/20/21 at 10:58 AM EDT    For Pharmacy Admin Tracking Only     Intervention Detail:    Total # of Interventions Recommended: 0   Total # of Interventions Accepted: 0   Time Spent (min): 15

## 2021-10-05 ENCOUNTER — HOSPITAL ENCOUNTER (OUTPATIENT)
Dept: MAMMOGRAPHY | Age: 51
Discharge: HOME | End: 2021-10-07
Payer: COMMERCIAL

## 2021-10-05 VITALS — WEIGHT: 234 LBS | HEIGHT: 68 IN | BODY MASS INDEX: 35.46 KG/M2

## 2021-10-05 DIAGNOSIS — Z12.31 ENCOUNTER FOR SCREENING MAMMOGRAM FOR BREAST CANCER: ICD-10-CM

## 2021-10-05 PROCEDURE — 77063 BREAST TOMOSYNTHESIS BI: CPT

## 2021-10-25 ENCOUNTER — HOSPITAL ENCOUNTER (OUTPATIENT)
Dept: PHARMACY | Age: 51
Setting detail: THERAPIES SERIES
Discharge: HOME | End: 2021-10-25
Payer: COMMERCIAL

## 2021-10-25 DIAGNOSIS — Z86.718 HX OF DEEP VENOUS THROMBOSIS: Primary | ICD-10-CM

## 2021-10-25 LAB
INR BLD: 2.1
PROTIME: 25.6 SECONDS

## 2021-10-25 PROCEDURE — 85610 PROTHROMBIN TIME: CPT

## 2021-10-25 PROCEDURE — 99211 OFF/OP EST MAY X REQ PHY/QHP: CPT

## 2021-10-25 NOTE — PROGRESS NOTES
Medication Management Service, Warfarin Management  [facility] Medication Management, [phone]  Visit Date: 10/25/2021   Subjective:   Rebekah De La Paz is a 49 y.o. female who presents to clinic today for anticoagulation monitoring and adjustment. Patient was referred for warfarin management due to  Indication:   DVT. INR goal: of 2.0-3.0. Duration of therapy: indefinite. Patient reports the following:   Adherent with regimen:  Yes  Missed or extra doses: The patient did miss a dose last week, but took the next morning. Bleeding or thromboembolic side effects: The patient has a couple scratches on her right hand from a kitten. She states she bled from these but was able to get them to stop bleeding. Significant medication, dietary, alcohol, or tobacco changes:  None  Significant recent illness, disease state changes, or hospitalization:  The patient had a vaginal yeast infection last week and was taking an over the counter vaginal insert for this. Upcoming surgeries or procedures:  None           Assessment and PLAN   PT/INR done in office per protocol. INR today is 2.1, therapeutic. Plan:  Instructed the patient to continue the current warfarin regimen of 5 mg Sun, Wed, Fri and 2.5 mg on all other days. Using warfarin 5 mg tablets. Recheck INR in 6 week(s). Patient verbalized understanding of dosing directions and information discussed. Dosing schedule given to patient. Progress note sent to referring office. Patient acknowledges working in consult agreement with pharmacist as referred by his/her physician.       Electronically signed by Shruti Mo [facility] on 10/25/21 at 11:30 AM EDT    For Pharmacy Admin Tracking Only     Intervention Detail:    Total # of Interventions Recommended: 0   Total # of Interventions Accepted: 0   Time Spent (min): 20

## 2021-11-01 ENCOUNTER — TELEPHONE (OUTPATIENT)
Dept: PRIMARY CARE CLINIC | Age: 51
End: 2021-11-01

## 2021-11-01 NOTE — TELEPHONE ENCOUNTER
Pt concerned for yeast infection that has not gone away. Pt took OTC Vagistat, and did not know she was not to take medication due to being on warfarin. Pt states that she had vaginal bleeding x 2 weeks, but has stopped last week. Pt state he still have vaginal yeast infection. Pt has appt on 11/03/2021 to further discuss with PCP and was advised to be seen at walk in clinic if any issue arise before her appt.     Please advise any other further instructions for pt

## 2021-11-03 ENCOUNTER — TELEPHONE (OUTPATIENT)
Dept: PRIMARY CARE CLINIC | Age: 51
End: 2021-11-03

## 2021-11-03 ENCOUNTER — OFFICE VISIT (OUTPATIENT)
Dept: PRIMARY CARE CLINIC | Age: 51
End: 2021-11-03
Payer: COMMERCIAL

## 2021-11-03 VITALS
SYSTOLIC BLOOD PRESSURE: 104 MMHG | WEIGHT: 236 LBS | HEART RATE: 83 BPM | RESPIRATION RATE: 16 BRPM | BODY MASS INDEX: 35.88 KG/M2 | OXYGEN SATURATION: 99 % | DIASTOLIC BLOOD PRESSURE: 66 MMHG

## 2021-11-03 DIAGNOSIS — Z86.718 HX OF DEEP VENOUS THROMBOSIS: ICD-10-CM

## 2021-11-03 DIAGNOSIS — R73.03 PREDIABETES: ICD-10-CM

## 2021-11-03 DIAGNOSIS — I50.42 CHRONIC COMBINED SYSTOLIC AND DIASTOLIC CONGESTIVE HEART FAILURE (HCC): ICD-10-CM

## 2021-11-03 DIAGNOSIS — N76.1 SUBACUTE VAGINITIS: Primary | ICD-10-CM

## 2021-11-03 DIAGNOSIS — Z00.00 ANNUAL PHYSICAL EXAM: ICD-10-CM

## 2021-11-03 DIAGNOSIS — I10 ESSENTIAL HYPERTENSION: ICD-10-CM

## 2021-11-03 DIAGNOSIS — Z95.1 S/P CABG (CORONARY ARTERY BYPASS GRAFT): ICD-10-CM

## 2021-11-03 DIAGNOSIS — E55.9 VITAMIN D DEFICIENCY: ICD-10-CM

## 2021-11-03 DIAGNOSIS — Z79.01 ANTICOAGULATED ON COUMADIN: ICD-10-CM

## 2021-11-03 DIAGNOSIS — K21.9 GASTROESOPHAGEAL REFLUX DISEASE WITHOUT ESOPHAGITIS: ICD-10-CM

## 2021-11-03 DIAGNOSIS — Z23 NEED FOR INFLUENZA VACCINATION: ICD-10-CM

## 2021-11-03 PROCEDURE — 1036F TOBACCO NON-USER: CPT | Performed by: STUDENT IN AN ORGANIZED HEALTH CARE EDUCATION/TRAINING PROGRAM

## 2021-11-03 PROCEDURE — G8427 DOCREV CUR MEDS BY ELIG CLIN: HCPCS | Performed by: STUDENT IN AN ORGANIZED HEALTH CARE EDUCATION/TRAINING PROGRAM

## 2021-11-03 PROCEDURE — G8417 CALC BMI ABV UP PARAM F/U: HCPCS | Performed by: STUDENT IN AN ORGANIZED HEALTH CARE EDUCATION/TRAINING PROGRAM

## 2021-11-03 PROCEDURE — 90674 CCIIV4 VAC NO PRSV 0.5 ML IM: CPT | Performed by: STUDENT IN AN ORGANIZED HEALTH CARE EDUCATION/TRAINING PROGRAM

## 2021-11-03 PROCEDURE — G8482 FLU IMMUNIZE ORDER/ADMIN: HCPCS | Performed by: STUDENT IN AN ORGANIZED HEALTH CARE EDUCATION/TRAINING PROGRAM

## 2021-11-03 PROCEDURE — 99213 OFFICE O/P EST LOW 20 MIN: CPT | Performed by: STUDENT IN AN ORGANIZED HEALTH CARE EDUCATION/TRAINING PROGRAM

## 2021-11-03 PROCEDURE — 3017F COLORECTAL CA SCREEN DOC REV: CPT | Performed by: STUDENT IN AN ORGANIZED HEALTH CARE EDUCATION/TRAINING PROGRAM

## 2021-11-03 PROCEDURE — 90471 IMMUNIZATION ADMIN: CPT | Performed by: STUDENT IN AN ORGANIZED HEALTH CARE EDUCATION/TRAINING PROGRAM

## 2021-11-03 RX ORDER — CLINDAMYCIN HYDROCHLORIDE 300 MG/1
300 CAPSULE ORAL 2 TIMES DAILY
Qty: 14 CAPSULE | Refills: 0 | Status: SHIPPED | OUTPATIENT
Start: 2021-11-03 | End: 2021-11-10

## 2021-11-03 RX ORDER — FLUCONAZOLE 150 MG/1
150 TABLET ORAL ONCE
Qty: 1 TABLET | Refills: 0 | Status: SHIPPED | OUTPATIENT
Start: 2021-11-03 | End: 2021-11-03

## 2021-11-03 RX ORDER — METRONIDAZOLE 500 MG/1
500 TABLET ORAL 2 TIMES DAILY
Qty: 14 TABLET | Refills: 0 | Status: SHIPPED | OUTPATIENT
Start: 2021-11-03 | End: 2021-11-03

## 2021-11-03 ASSESSMENT — PATIENT HEALTH QUESTIONNAIRE - PHQ9
2. FEELING DOWN, DEPRESSED OR HOPELESS: 0
SUM OF ALL RESPONSES TO PHQ9 QUESTIONS 1 & 2: 0
1. LITTLE INTEREST OR PLEASURE IN DOING THINGS: 0
SUM OF ALL RESPONSES TO PHQ QUESTIONS 1-9: 0

## 2021-11-03 ASSESSMENT — ENCOUNTER SYMPTOMS
BACK PAIN: 0
COUGH: 0
SHORTNESS OF BREATH: 0
ABDOMINAL DISTENTION: 0
EYE ITCHING: 0
RHINORRHEA: 0
ABDOMINAL PAIN: 0
EYE DISCHARGE: 0
WHEEZING: 0

## 2021-11-03 NOTE — PROGRESS NOTES
Vaccine Information Sheet, \"Influenza - Inactivated\"  given to Yoselyn Macias, or parent/legal guardian of  Yoselyn Macias and verbalized understanding. Patient responses:    Have you ever had a reaction to a flu vaccine? No  Are you able to eat eggs without adverse effects? Yes  Do you have any current illness? No  Have you ever had [place] Syndrome? No    Flu vaccine given per order. Please see immunization tab.

## 2021-11-03 NOTE — TELEPHONE ENCOUNTER
Jaylen Momin called stating the pt. Medication fill was flagged, Flagyl, increased risk for bleeding due to contraindication; pt is on blood thinner. Holding fill on medication until call back received from provider's office.

## 2021-11-03 NOTE — PROGRESS NOTES
[facility] PRIMARY [facility]  [address]  [address] [address]  Dept: [phone]  Dept Fax: [phone]    Juwan Fonseca is a 49 y.o. female who presents today for her medical conditions/complaints as noted below. Chief Complaint   Patient presents with    Vaginitis    Leg Pain     left       HPI:     49 y. o. M/F presented to office today for vaginal infection. Patient mentioned that she has been experiencing grayish vaginal discharge with fishy odor a lot of itching. Denied any rash, curdy white discharge, erythema, local trauma. No new sexual partners. She is a prediabetic on Jardiance. Denied any history of recurrent vaginal infections. Her other chronic comorbidities including essential hypertension, prediabetes, CAD s/p CABG and systolic heart failure(EF 15%) s/p AICD are stable  Follows up with cardiology. She is on Coumadin for previous history of DVT. Follows up with Coumadin clinic. No other current complaints     Advised lifestyle changes. Medications reviewed and refilled as appropriate, problem list updated. All concerns discussed in details and all questions answered to patient satisfaction.         Hemoglobin A1C (%)   Date Value   03/11/2021 6.4 (H)             ( goal A1C is < 7)   No results found for: LABMICR  LDL Cholesterol (mg/dL)   Date Value   05/04/2021 120   03/11/2021 154 (H)       (goal LDL is <100)   AST (U/L)   Date Value   05/04/2021 56 (H)     ALT (U/L)   Date Value   05/04/2021 43 (H)     BUN (mg/dL)   Date Value   05/04/2021 14     BP Readings from Last 3 Encounters:   11/03/21 104/66   09/01/21 102/64   05/04/21 97/66          (goal 120/80)    Past Medical History:   Diagnosis Date    CAD (coronary artery disease)     GERD (gastroesophageal reflux disease)     Hyperlipidemia     Hypertension     Prediabetes       Past Surgical History:   Procedure Laterality Date    CARDIAC DEFIBRILLATOR PLACEMENT 2021    CORONARY ARTERY BYPASS GRAFT      EYE SURGERY  1990    Eye Socket    SHOULDER SURGERY Right 1994    rods and pins placed    TONSILLECTOMY         Family History   Problem Relation Age of Onset    Heart Disease Father     Colon Cancer Brother     Heart Attack Mother        Social History     Tobacco Use    Smoking status: Former Smoker     Packs/day: 1.00     Years: 25.00     Pack years: 25.00     Types: Cigarettes     Quit date: 2007     Years since quittin.8    Smokeless tobacco: Never Used   Substance Use Topics    Alcohol use: Not Currently      Current Outpatient Medications   Medication Sig Dispense Refill    metroNIDAZOLE (FLAGYL) 500 MG tablet Take 1 tablet by mouth 2 times daily for 7 days 14 tablet 0    fluconazole (DIFLUCAN) 150 MG tablet Take 1 tablet by mouth once for 1 dose 1 tablet 0    empagliflozin (JARDIANCE) 10 MG tablet Take 1 tablet by mouth once daily 90 tablet 3    vitamin D (ERGOCALCIFEROL) 1.25 MG (19818 UT) CAPS capsule Take 1 capsule by mouth once a week 12 capsule 1    sacubitril-valsartan (ENTRESTO) 24-26 MG per tablet Take 1 tablet by mouth 2 times daily      warfarin (COUMADIN) 1 MG tablet Take 5.5 mg (1 of the 5 mg tablet + 1/2 of the 1 mg tablet) daily or as directed by Coumadin Clinic. 45 tablet 1    Handicap Placard MISC by Does not apply route For 5 years 1 each 0    metoprolol succinate (TOPROL XL) 25 MG extended release tablet Take 75 mg by mouth 2 times daily      furosemide (LASIX) 40 MG tablet Take 40 mg by mouth daily      atorvastatin (LIPITOR) 40 MG tablet Take 40 mg by mouth daily      potassium chloride (KLOR-CON) 20 MEQ packet Take 20 mEq by mouth daily       No current facility-administered medications for this visit.      No Known Allergies    Health Maintenance   Topic Date Due    Hepatitis C screen  Never done    Pneumococcal 0-64 years Vaccine (1 of 2 - PPSV23) Never done    HIV screen  Never done    Cervical cancer screen  Never done    Colon cancer screen colonoscopy  Never done    Shingles Vaccine (1 of 2) Never done    Low dose CT lung screening  Never done    COVID-19 Vaccine (3 - Pfizer booster) 10/15/2021    DTaP/Tdap/Td vaccine (1 - Tdap) 11/25/2021 (Originally 7/3/1990)    A1C test (Diabetic or Prediabetic)  03/11/2022    Lipid screen  05/04/2022    Potassium monitoring  05/04/2022    Creatinine monitoring  05/04/2022    Breast cancer screen  10/05/2023    Flu vaccine  Completed    Hepatitis A vaccine  Aged Out    Hepatitis B vaccine  Aged Out    Hib vaccine  Aged Out    Meningococcal (ACWY) vaccine  Aged Out       Subjective:      Review of Systems   Constitutional: Negative for chills, fatigue and fever. HENT: Negative for congestion, hearing loss and rhinorrhea. Eyes: Negative for discharge and itching. Respiratory: Negative for cough, shortness of breath and wheezing. Cardiovascular: Negative for chest pain, palpitations and leg swelling. Gastrointestinal: Negative for abdominal distention and abdominal pain. Endocrine: Negative for polydipsia and polyphagia. Genitourinary: Positive for vaginal discharge. Negative for difficulty urinating, dyspareunia, dysuria, flank pain, frequency, hematuria, menstrual problem, urgency, vaginal bleeding and vaginal pain. Musculoskeletal: Negative for arthralgias and back pain. Skin: Negative for rash and wound. Neurological: Negative for dizziness, seizures, light-headedness and headaches. Psychiatric/Behavioral: Negative for agitation and behavioral problems. Objective:     Physical Exam  Constitutional:       Appearance: She is well-developed. HENT:      Head: Normocephalic and atraumatic. Eyes:      Conjunctiva/sclera: Conjunctivae normal.      Pupils: Pupils are equal, round, and reactive to light. Neck:      Thyroid: No thyromegaly. Vascular: No JVD.    Cardiovascular:      Rate and Rhythm: Normal rate and regular rhythm. Heart sounds: Normal heart sounds. No murmur heard. Pulmonary:      Effort: Pulmonary effort is normal.      Breath sounds: Normal breath sounds. No wheezing. Abdominal:      General: Bowel sounds are normal. There is no distension. Palpations: Abdomen is soft. Tenderness: There is no abdominal tenderness. There is no rebound. Musculoskeletal:         General: No tenderness. Normal range of motion. Cervical back: Normal range of motion and neck supple. Neurological:      Mental Status: She is alert and oriented to person, place, and time. Cranial Nerves: No cranial nerve deficit. Psychiatric:         Behavior: Behavior normal.       /66   Pulse 83   Resp 16   Wt 236 lb (107 kg)   SpO2 99%   BMI 35.88 kg/m²     Assessment:          1. Subacute vaginitis  - metroNIDAZOLE (FLAGYL) 500 MG tablet; Take 1 tablet by mouth 2 times daily for 7 days  Dispense: 14 tablet; Refill: 0  - fluconazole (DIFLUCAN) 150 MG tablet; Take 1 tablet by mouth once for 1 dose  Dispense: 1 tablet; Refill: 0    2. Need for influenza vaccination    - INFLUENZA, MDCK QUADV, 2 YRS AND OLDER, IM, PF, PREFILL SYR OR SDV, 0.5ML (FLUCELVAX QUADV, PF)    3. Annual physical exam    - CBC Auto Differential; Future  - Comprehensive Metabolic Panel; Future  - TSH With Reflex Ft4; Future  - Hemoglobin A1C; Future  - Vitamin B12 & Folate; Future  - Lipid Panel    4. Vitamin D deficiency    - Vitamin D 25 Hydroxy; Future    5. Chronic combined systolic and diastolic congestive heart failure (HCC)  Stable    6. Essential hypertension  Well-controlled    7. Gastroesophageal reflux disease without esophagitis  Stable    8. Anticoagulated on Coumadin  Stable    9. Prediabetes  Stable    10. S/P CABG (coronary artery bypass graft)  Stable    11. Hx of deep venous thrombosis  On Coumadin            Diagnosis Orders   1.  Subacute vaginitis  metroNIDAZOLE (FLAGYL) 500 MG tablet    fluconazole (DIFLUCAN) 150 MG tablet   2. Need for influenza vaccination  INFLUENZA, MDCK QUADV, 2 YRS AND OLDER, IM, PF, PREFILL SYR OR SDV, 0.5ML (FLUCELVAX QUADV, PF)   3. Annual physical exam  CBC Auto Differential    Comprehensive Metabolic Panel    TSH With Reflex Ft4    Hemoglobin A1C    Vitamin B12 & Folate    Lipid Panel   4. Vitamin D deficiency  Vitamin D 25 Hydroxy   5. Chronic combined systolic and diastolic congestive heart failure ([address]     6. Essential hypertension     7. Gastroesophageal reflux disease without esophagitis     8. Anticoagulated on Coumadin     9. Prediabetes     10. S/P CABG (coronary artery bypass graft)     11. Hx of deep venous thrombosis             Plan:      Return in about 6 months (around 5/3/2022). Orders Placed This Encounter   Procedures    INFLUENZA, MDCK QUADV, 2 YRS AND OLDER, IM, PF, PREFILL SYR OR SDV, 0.5ML (FLUCELVAX QUADV, PF)    CBC Auto Differential     Standing Status:   Future     Standing Expiration Date:   11/3/2022    Comprehensive Metabolic Panel     Standing Status:   Future     Standing Expiration Date:   11/3/2022    TSH With Reflex Ft4     Standing Status:   Future     Standing Expiration Date:   11/3/2022    Hemoglobin A1C     Standing Status:   Future     Standing Expiration Date:   11/3/2022    Vitamin D 25 Hydroxy     Standing Status:   Future     Standing Expiration Date:   11/3/2022    Vitamin B12 & Folate     Standing Status:   Future     Standing Expiration Date:   11/3/2022    Lipid Panel     Order Specific Question:   Is Patient Fasting?/# of Hours     Answer: Fast 8-10 hours     Orders Placed This Encounter   Medications    metroNIDAZOLE (FLAGYL) 500 MG tablet     Sig: Take 1 tablet by mouth 2 times daily for 7 days     Dispense:  14 tablet     Refill:  0    fluconazole (DIFLUCAN) 150 MG tablet     Sig: Take 1 tablet by mouth once for 1 dose     Dispense:  1 tablet     Refill:  0         Patient given educational materials - see patient instructions. Discussed use, benefit, and side effects of prescribedmedications. All patient questions answered. Pt voiced understanding. Reviewed health maintenance. Instructed to continue current medications, diet and exercise. Patient agreed with treatment plan. Follow up as directed. I spent a total of 20-29 minutes face to face with this patient. Over 50% of that time was spent on counseling and care coordination. Please see assessment and plan for details. Electronically signed by   Krysta Boyle MD on 11/3/2021 at 2:16 PM  [facility]. Please note that this chart was generated using voice recognition Dragon dictation software. Although every effort was made to ensure the accuracy of this automatedtranscription, some errors in transcription may have occurred.

## 2021-11-16 DIAGNOSIS — Z12.11 ENCOUNTER FOR COLORECTAL CANCER SCREENING: ICD-10-CM

## 2021-11-16 DIAGNOSIS — Z12.12 ENCOUNTER FOR COLORECTAL CANCER SCREENING: ICD-10-CM

## 2021-12-06 ENCOUNTER — HOSPITAL ENCOUNTER (OUTPATIENT)
Dept: PHARMACY | Age: 51
Setting detail: THERAPIES SERIES
Discharge: HOME | End: 2021-12-06
Payer: COMMERCIAL

## 2021-12-06 ENCOUNTER — HOSPITAL ENCOUNTER (OUTPATIENT)
Age: 51
Discharge: HOME | End: 2021-12-06
Payer: COMMERCIAL

## 2021-12-06 DIAGNOSIS — Z86.718 HX OF DEEP VENOUS THROMBOSIS: Primary | ICD-10-CM

## 2021-12-06 DIAGNOSIS — E55.9 VITAMIN D DEFICIENCY: ICD-10-CM

## 2021-12-06 DIAGNOSIS — Z00.00 ANNUAL PHYSICAL EXAM: ICD-10-CM

## 2021-12-06 LAB
ABSOLUTE EOS #: 0.3 K/UL (ref 0–0.4)
ABSOLUTE IMMATURE GRANULOCYTE: NORMAL K/UL (ref 0–0.3)
ABSOLUTE LYMPH #: 1.9 K/UL (ref 1–4.8)
ABSOLUTE MONO #: 0.5 K/UL (ref 0.1–1.2)
ALBUMIN SERPL-MCNC: 4.3 G/DL (ref 3.5–5.2)
ALBUMIN/GLOBULIN RATIO: 1.3 (ref 1–2.5)
ALP BLD-CCNC: 135 U/L (ref 35–104)
ALT SERPL-CCNC: 14 U/L (ref 5–33)
ANION GAP SERPL CALCULATED.3IONS-SCNC: 11 MMOL/L (ref 9–17)
AST SERPL-CCNC: 17 U/L
BASOPHILS # BLD: 1 % (ref 0–2)
BASOPHILS ABSOLUTE: 0.1 K/UL (ref 0–0.2)
BILIRUB SERPL-MCNC: 0.7 MG/DL (ref 0.3–1.2)
BUN BLDV-MCNC: 18 MG/DL (ref 6–20)
BUN/CREAT BLD: ABNORMAL (ref 9–20)
CALCIUM SERPL-MCNC: 9.3 MG/DL (ref 8.6–10.4)
CHLORIDE BLD-SCNC: 102 MMOL/L (ref 98–107)
CO2: 25 MMOL/L (ref 20–31)
CREAT SERPL-MCNC: 0.7 MG/DL (ref 0.5–0.9)
DIFFERENTIAL TYPE: NORMAL
EOSINOPHILS RELATIVE PERCENT: 4 % (ref 1–4)
ESTIMATED AVERAGE GLUCOSE: 126 MG/DL
FOLATE: 12.2 NG/ML
GFR AFRICAN AMERICAN: >60 ML/MIN
GFR NON-AFRICAN AMERICAN: >60 ML/MIN
GFR SERPL CREATININE-BSD FRML MDRD: ABNORMAL ML/MIN/{1.73_M2}
GFR SERPL CREATININE-BSD FRML MDRD: ABNORMAL ML/MIN/{1.73_M2}
GLUCOSE BLD-MCNC: 109 MG/DL (ref 70–99)
HBA1C MFR BLD: 6 % (ref 4–6)
HCT VFR BLD CALC: 45.1 % (ref 36–46)
HEMOGLOBIN: 15.3 G/DL (ref 12–16)
IMMATURE GRANULOCYTES: NORMAL %
INR BLD: 1.6
LYMPHOCYTES # BLD: 27 % (ref 24–44)
MCH RBC QN AUTO: 31.1 PG (ref 26–34)
MCHC RBC AUTO-ENTMCNC: 34 G/DL (ref 31–37)
MCV RBC AUTO: 91.5 FL (ref 80–100)
MONOCYTES # BLD: 7 % (ref 2–11)
NRBC AUTOMATED: NORMAL PER 100 WBC
PDW BLD-RTO: 13.2 % (ref 12.5–15.4)
PLATELET # BLD: 243 K/UL (ref 140–450)
PLATELET ESTIMATE: NORMAL
PMV BLD AUTO: 8.8 FL (ref 6–12)
POTASSIUM SERPL-SCNC: 4.1 MMOL/L (ref 3.7–5.3)
PROTIME: 19.2 SECONDS
RBC # BLD: 4.93 M/UL (ref 4–5.2)
RBC # BLD: NORMAL 10*6/UL
SEG NEUTROPHILS: 61 % (ref 36–66)
SEGMENTED NEUTROPHILS ABSOLUTE COUNT: 4.4 K/UL (ref 1.8–7.7)
SODIUM BLD-SCNC: 138 MMOL/L (ref 135–144)
TOTAL PROTEIN: 7.5 G/DL (ref 6.4–8.3)
TSH SERPL DL<=0.05 MIU/L-ACNC: 1.07 MIU/L (ref 0.3–5)
VITAMIN B-12: 488 PG/ML (ref 232–1245)
VITAMIN D 25-HYDROXY: 30 NG/ML (ref 30–100)
WBC # BLD: 7.1 K/UL (ref 3.5–11)
WBC # BLD: NORMAL 10*3/UL

## 2021-12-06 PROCEDURE — 85610 PROTHROMBIN TIME: CPT

## 2021-12-06 PROCEDURE — 83036 HEMOGLOBIN GLYCOSYLATED A1C: CPT

## 2021-12-06 PROCEDURE — 82607 VITAMIN B-12: CPT

## 2021-12-06 PROCEDURE — 82746 ASSAY OF FOLIC ACID SERUM: CPT

## 2021-12-06 PROCEDURE — 36415 COLL VENOUS BLD VENIPUNCTURE: CPT

## 2021-12-06 PROCEDURE — 80053 COMPREHEN METABOLIC PANEL: CPT

## 2021-12-06 PROCEDURE — 99212 OFFICE O/P EST SF 10 MIN: CPT

## 2021-12-06 PROCEDURE — 85025 COMPLETE CBC W/AUTO DIFF WBC: CPT

## 2021-12-06 PROCEDURE — 84443 ASSAY THYROID STIM HORMONE: CPT

## 2021-12-06 PROCEDURE — 82306 VITAMIN D 25 HYDROXY: CPT

## 2021-12-06 NOTE — PROGRESS NOTES
Medication Management Service, Warfarin Management  [facility] Medication Management, [phone]  Visit Date: 12/6/2021   Subjective:   Yoselyn Macias is a 49 y.o. female who presents to clinic today for anticoagulation monitoring and adjustment. Patient was referred for warfarin management due to  Indication:   DVT. INR goal: of 2.0-3.0. Duration of therapy: indefinite. Patient reports the following:   Adherent with regimen:  Yes  Missed or extra doses:  None   Bleeding or thromboembolic side effects:  None  Significant medication, dietary, alcohol, or tobacco changes:  None  Significant recent illness, disease state changes, or hospitalization:  None  Upcoming surgeries or procedures:  None           Assessment and PLAN   PT/INR done in office per protocol. INR today is 1.6, subtherapeutic. Plan:  Instructed the patient to start an increased warfarin regimen of 2.5 mg Tues, Thur, Sat and 5 mg on all other days. Using warfarin 5 mg tablets. Recheck INR in 2 week(s). Patient verbalized understanding of dosing directions and information discussed. Dosing schedule given to patient. Progress note sent to referring office. Patient acknowledges working in consult agreement with pharmacist as referred by his/her physician.       Electronically signed by Ena Pierce [address] [place] [address] on 12/6/21 at 11:34 AM EST    For Pharmacy Admin Tracking Only     Intervention Detail: Dose Adjustment: 1, reason: Therapy Optimization   Total # of Interventions Recommended: 1   Total # of Interventions Accepted: 1   Time Spent (min): 15

## 2021-12-17 ENCOUNTER — IMMUNIZATION (OUTPATIENT)
Dept: PRIMARY CARE CLINIC | Age: 51
End: 2021-12-17
Payer: COMMERCIAL

## 2021-12-17 ENCOUNTER — HOSPITAL ENCOUNTER (OUTPATIENT)
Dept: PHARMACY | Age: 51
Setting detail: THERAPIES SERIES
Discharge: HOME | End: 2021-12-17
Payer: COMMERCIAL

## 2021-12-17 DIAGNOSIS — Z86.718 HX OF DEEP VENOUS THROMBOSIS: Primary | ICD-10-CM

## 2021-12-17 DIAGNOSIS — Z23 NEED FOR COVID-19 VACCINE: Primary | ICD-10-CM

## 2021-12-17 LAB
INR BLD: 2.6
PROTIME: 31.6 SECONDS

## 2021-12-17 PROCEDURE — 99211 OFF/OP EST MAY X REQ PHY/QHP: CPT

## 2021-12-17 PROCEDURE — 85610 PROTHROMBIN TIME: CPT

## 2021-12-17 PROCEDURE — 0004A COVID-19, PFIZER VACCINE 30MCG/0.3ML DOSE: CPT | Performed by: STUDENT IN AN ORGANIZED HEALTH CARE EDUCATION/TRAINING PROGRAM

## 2021-12-17 PROCEDURE — 91300 COVID-19, PFIZER VACCINE 30MCG/0.3ML DOSE: CPT | Performed by: STUDENT IN AN ORGANIZED HEALTH CARE EDUCATION/TRAINING PROGRAM

## 2021-12-17 NOTE — PROGRESS NOTES
Medication Management Service, Warfarin Management  [facility] Medication Management, [phone]  Visit Date: 12/17/2021   Subjective:   Karyl Osler is a 49 y.o. female who presents to clinic today for anticoagulation monitoring and adjustment.     Patient was referred for warfarin management due to  Indication:   DVT. INR goal: of 2.0-3.0. Duration of therapy: indefinite.     Patient reports the following:   Adherent with regimen:  Yes  Missed or extra doses:  None   Bleeding or thromboembolic side effects:  None  Significant medication, dietary, alcohol, or tobacco changes:  None  Significant recent illness, disease state changes, or hospitalization:  None  Upcoming surgeries or procedures:  None           Assessment and PLAN   PT/INR done in office per protocol. INR today is 2.6, therapeutic.     Plan:  Instructed the patient to continue warfarin regimen of 2.5 mg Tues, Thur, Sat and 5 mg on all other days. Using warfarin 5 mg tablets.     Recheck INR in 6 week(s).      Patient verbalized understanding of dosing directions and information discussed. Dosing schedule given to patient. Progress note sent to referring office. Patient acknowledges working in consult agreement with pharmacist as referred by his/her physician.    92 [facility] Tracking Only     · Intervention Detail: Dose Adjustment: 0, reason: Therapy Optimization  · Total # of Interventions Recommended: 0  · Total # of Interventions Accepted: 0  · Time Spent (min): 15    Kiley Navarrete,   PharmD  12/17/2021 12:50 PM

## 2021-12-17 NOTE — PROGRESS NOTES
After obtaining consent a injection of Pfizer BioNTech/Comirnaty COVID19 Vaccine was given in Left deltoid by Raad Fish MA. Patient instructed to report any adverse reaction to the office immediately. Patient tolerated injection & was advised to remain in the office for 15 minutes. If patient is feeling well after 15 minutes they may leave the office.

## 2021-12-20 ENCOUNTER — APPOINTMENT (OUTPATIENT)
Dept: PHARMACY | Age: 51
End: 2021-12-20
Payer: COMMERCIAL

## 2022-01-31 ENCOUNTER — HOSPITAL ENCOUNTER (OUTPATIENT)
Dept: PHARMACY | Age: 52
Setting detail: THERAPIES SERIES
Discharge: HOME | End: 2022-01-31
Payer: COMMERCIAL

## 2022-01-31 DIAGNOSIS — Z86.718 HX OF DEEP VENOUS THROMBOSIS: Primary | ICD-10-CM

## 2022-01-31 LAB
INR BLD: 2.3
PROTIME: 28.2 SECONDS

## 2022-01-31 PROCEDURE — 85610 PROTHROMBIN TIME: CPT

## 2022-01-31 PROCEDURE — 99211 OFF/OP EST MAY X REQ PHY/QHP: CPT

## 2022-01-31 RX ORDER — ERGOCALCIFEROL 1.25 MG/1
50000 CAPSULE ORAL WEEKLY
Qty: 12 CAPSULE | Refills: 0 | Status: SHIPPED | OUTPATIENT
Start: 2022-01-31 | End: 2022-05-03 | Stop reason: SDUPTHER

## 2022-01-31 RX ORDER — WARFARIN SODIUM 5 MG/1
TABLET ORAL
Qty: 90 TABLET | Refills: 1 | Status: SHIPPED | OUTPATIENT
Start: 2022-01-31 | End: 2022-07-25

## 2022-03-14 ENCOUNTER — HOSPITAL ENCOUNTER (OUTPATIENT)
Dept: PHARMACY | Age: 52
Setting detail: THERAPIES SERIES
Discharge: HOME | End: 2022-03-14
Payer: COMMERCIAL

## 2022-03-14 LAB
INR BLD: 2.9
PROTIME: 34.2 SECONDS

## 2022-03-14 PROCEDURE — 85610 PROTHROMBIN TIME: CPT

## 2022-03-14 PROCEDURE — 99211 OFF/OP EST MAY X REQ PHY/QHP: CPT

## 2022-03-14 NOTE — PROGRESS NOTES
Medication Management Service, Warfarin Management  [facility] [phone]  Visit Date: 3/14/2022   Subjective:   Joyce Briggs is a 49 y.o. female who presents to clinic today for anticoagulation monitoring and adjustment. Patient seen in clinic for warfarin management due to  Indication:   DVT. INR goal: of 2.0-3.0. Duration of therapy: indefinite. Assessment and PLAN   PT/INR done in office per protocol. INR today is 2.9, therapeutic. Plan: Will continue current regimen of warfarin 2.5 mg on TTS and 5 mg all other days of the week. Using warfarin 5 mg tablets. Recheck INR in six weeks. Patient seen at [address] [place] [address]  Patient attested to self screening for COVID - 19. Patient verbalized understanding of dosing directions and information discussed. Dosing schedule given to patient. Progress note sent to referring office. Patient acknowledges working in consult agreement with pharmacist as referred by his/her physician.       Electronically signed by Angelique Rosales [facility] on 3/14/22 at 11:24 AM EDT    For Pharmacy Admin Tracking Only     No interventions made   Time Spent (min): 10

## 2022-03-15 ENCOUNTER — TELEPHONE (OUTPATIENT)
Dept: PHARMACY | Age: 52
End: 2022-03-15

## 2022-03-15 ENCOUNTER — TELEPHONE (OUTPATIENT)
Dept: PRIMARY CARE CLINIC | Age: 52
End: 2022-03-15

## 2022-03-15 NOTE — TELEPHONE ENCOUNTER
Patient reports she is trying to have a dental extraction and was told she would need to hold warfarin for a couple days. I asked she notify her PCP and then let us know. PCP is agreeable to hold a couple days.   Patient is scheduled to have the extraction this Friday 3/18/22 and she scheduled an appointment in clinic to check INR 3/28/22

## 2022-03-15 NOTE — TELEPHONE ENCOUNTER
Pt was advised she needs dental work. Coumadin clinic stated she will be ok to dc thinners for several days. She was advised to clear this w/ PCP.  Please advise

## 2022-03-28 ENCOUNTER — HOSPITAL ENCOUNTER (OUTPATIENT)
Age: 52
Discharge: HOME | End: 2022-03-28
Payer: COMMERCIAL

## 2022-03-28 ENCOUNTER — HOSPITAL ENCOUNTER (OUTPATIENT)
Dept: PHARMACY | Age: 52
Setting detail: THERAPIES SERIES
Discharge: HOME | End: 2022-03-28
Payer: COMMERCIAL

## 2022-03-28 DIAGNOSIS — Z86.718 HX OF DEEP VENOUS THROMBOSIS: Primary | ICD-10-CM

## 2022-03-28 LAB
ANION GAP SERPL CALCULATED.3IONS-SCNC: 8 MMOL/L (ref 9–17)
BUN BLDV-MCNC: 13 MG/DL (ref 6–20)
CALCIUM SERPL-MCNC: 9.1 MG/DL (ref 8.6–10.4)
CHLORIDE BLD-SCNC: 103 MMOL/L (ref 98–107)
CO2: 28 MMOL/L (ref 20–31)
CREAT SERPL-MCNC: 0.65 MG/DL (ref 0.5–0.9)
GFR AFRICAN AMERICAN: >60 ML/MIN
GFR NON-AFRICAN AMERICAN: >60 ML/MIN
GFR SERPL CREATININE-BSD FRML MDRD: ABNORMAL ML/MIN/{1.73_M2}
GLUCOSE BLD-MCNC: 99 MG/DL (ref 70–99)
INR BLD: 1.8
POTASSIUM SERPL-SCNC: 4.3 MMOL/L (ref 3.7–5.3)
PROTIME: 21.4 SECONDS
SODIUM BLD-SCNC: 139 MMOL/L (ref 135–144)

## 2022-03-28 PROCEDURE — 80048 BASIC METABOLIC PNL TOTAL CA: CPT

## 2022-03-28 PROCEDURE — 85610 PROTHROMBIN TIME: CPT

## 2022-03-28 PROCEDURE — 99211 OFF/OP EST MAY X REQ PHY/QHP: CPT

## 2022-03-28 PROCEDURE — 36415 COLL VENOUS BLD VENIPUNCTURE: CPT

## 2022-03-28 NOTE — PROGRESS NOTES
Medication Management Service, Warfarin Management  [facility] Medication Management, [phone]  Visit Date: 3/28/2022   Subjective:   Marysol Rae is a 49 y.o. female who presents to clinic today for anticoagulation monitoring and adjustment. Patient was referred for warfarin management due to  Indication:   DVT. INR goal: of 2.0-3.0. Duration of therapy: indefinite. Patient reports the following:   Adherent with regimen:  Yes  Missed or extra doses:  None   Bleeding or thromboembolic side effects:  None  Significant medication, dietary, alcohol, or tobacco changes:  Entresto dose increased. Significant recent illness, disease state changes, or hospitalization:  None  Upcoming surgeries or procedures:  None           Assessment and PLAN   PT/INR done in office per protocol. INR today is 1.8, subtherapeutic. Plan:  Instructed the patient to take an increased dose of 7.5 mg today, then continue with the current warfarin regimen of 2.5 mg Tues, Thur, Sat and 5 mg on all other days. Using warfarin 5 mg tablets. Recheck INR in 2 week(s). Patient verbalized understanding of dosing directions and information discussed. Dosing schedule given to patient. Progress note sent to referring office. Patient acknowledges working in consult agreement with pharmacist as referred by his/her physician.       Electronically signed by Marcelino Louise, [address] [place] [address] on 3/28/22 at 11:25 AM EDT    For Pharmacy Admin Tracking Only     Intervention Detail: Dose Adjustment: 1, reason: Therapy Optimization   Total # of Interventions Recommended: 1   Total # of Interventions Accepted: 1   Time Spent (min): 15

## 2022-04-11 ENCOUNTER — HOSPITAL ENCOUNTER (OUTPATIENT)
Dept: PHARMACY | Age: 52
Setting detail: THERAPIES SERIES
Discharge: HOME | End: 2022-04-11
Payer: COMMERCIAL

## 2022-04-11 DIAGNOSIS — Z86.718 HX OF DEEP VENOUS THROMBOSIS: Primary | ICD-10-CM

## 2022-04-11 LAB
INR BLD: 3
PROTIME: 35.9 SECONDS

## 2022-04-11 PROCEDURE — 85610 PROTHROMBIN TIME: CPT

## 2022-04-11 PROCEDURE — 99211 OFF/OP EST MAY X REQ PHY/QHP: CPT

## 2022-04-11 NOTE — PROGRESS NOTES
Medication Management Service, Warfarin Management  [facility] Medication Management, [phone]  Visit Date: 4/11/2022   Subjective:   John Pedersen is a 49 y.o. female who presents to clinic today for anticoagulation monitoring and adjustment. Patient was referred for warfarin management due to  Indication:   DVT. INR goal: of 2.0-3.0. Duration of therapy: indefinite. Patient reports the following:   Adherent with regimen:  Yes  Missed or extra doses:  None   Bleeding or thromboembolic side effects:  None  Significant medication, dietary, alcohol, or tobacco changes: The patient was on an antibiotic prior to her dental procedure. She was also prescribed Tylenol #3 afterwards. Significant recent illness, disease state changes, or hospitalization:  None  Upcoming surgeries or procedures:  None           Assessment and PLAN   PT/INR done in office per protocol. INR today is 3.0, therapeutic. Plan:  Instructed the patient to continue the current warfarin regimen of 2.5 mg Tues, Thur, Sat and 5 mg on all other days. Using warfarin 5 mg tablets. Recheck INR in 4 week(s). Patient verbalized understanding of dosing directions and information discussed. Dosing schedule given to patient. Progress note sent to referring office. Patient acknowledges working in consult agreement with pharmacist as referred by his/her physician.       Electronically signed by Imelda Benoit [facility] on 4/11/22 at 10:44 AM EDT    For Pharmacy Admin Tracking Only     Intervention Detail:    Total # of Interventions Recommended: 0   Total # of Interventions Accepted: 0   Time Spent (min): 15

## 2022-05-03 ENCOUNTER — OFFICE VISIT (OUTPATIENT)
Dept: PRIMARY CARE CLINIC | Age: 52
End: 2022-05-03
Payer: COMMERCIAL

## 2022-05-03 VITALS
OXYGEN SATURATION: 99 % | HEART RATE: 70 BPM | RESPIRATION RATE: 16 BRPM | SYSTOLIC BLOOD PRESSURE: 108 MMHG | WEIGHT: 244.2 LBS | DIASTOLIC BLOOD PRESSURE: 72 MMHG | BODY MASS INDEX: 37.13 KG/M2

## 2022-05-03 DIAGNOSIS — E55.9 VITAMIN D DEFICIENCY: ICD-10-CM

## 2022-05-03 DIAGNOSIS — I50.22 CHRONIC SYSTOLIC CONGESTIVE HEART FAILURE (HCC): ICD-10-CM

## 2022-05-03 DIAGNOSIS — R73.03 PREDIABETES: ICD-10-CM

## 2022-05-03 DIAGNOSIS — I10 ESSENTIAL HYPERTENSION: ICD-10-CM

## 2022-05-03 DIAGNOSIS — Z95.810 S/P INTERNAL CARDIAC DEFIBRILLATOR PROCEDURE: ICD-10-CM

## 2022-05-03 DIAGNOSIS — Z95.1 S/P CABG (CORONARY ARTERY BYPASS GRAFT): ICD-10-CM

## 2022-05-03 DIAGNOSIS — K21.9 GASTROESOPHAGEAL REFLUX DISEASE WITHOUT ESOPHAGITIS: Primary | ICD-10-CM

## 2022-05-03 PROCEDURE — 99214 OFFICE O/P EST MOD 30 MIN: CPT | Performed by: STUDENT IN AN ORGANIZED HEALTH CARE EDUCATION/TRAINING PROGRAM

## 2022-05-03 PROCEDURE — G8427 DOCREV CUR MEDS BY ELIG CLIN: HCPCS | Performed by: STUDENT IN AN ORGANIZED HEALTH CARE EDUCATION/TRAINING PROGRAM

## 2022-05-03 PROCEDURE — G8417 CALC BMI ABV UP PARAM F/U: HCPCS | Performed by: STUDENT IN AN ORGANIZED HEALTH CARE EDUCATION/TRAINING PROGRAM

## 2022-05-03 PROCEDURE — 1036F TOBACCO NON-USER: CPT | Performed by: STUDENT IN AN ORGANIZED HEALTH CARE EDUCATION/TRAINING PROGRAM

## 2022-05-03 PROCEDURE — 3017F COLORECTAL CA SCREEN DOC REV: CPT | Performed by: STUDENT IN AN ORGANIZED HEALTH CARE EDUCATION/TRAINING PROGRAM

## 2022-05-03 RX ORDER — METOPROLOL SUCCINATE 25 MG/1
50 TABLET, EXTENDED RELEASE ORAL 2 TIMES DAILY
Qty: 90 TABLET | Refills: 3 | Status: SHIPPED | OUTPATIENT
Start: 2022-05-03 | End: 2022-06-02 | Stop reason: SDUPTHER

## 2022-05-03 RX ORDER — FUROSEMIDE 40 MG/1
40 TABLET ORAL DAILY
Qty: 60 TABLET | Refills: 2 | Status: SHIPPED | OUTPATIENT
Start: 2022-05-03

## 2022-05-03 RX ORDER — ATORVASTATIN CALCIUM 40 MG/1
40 TABLET, FILM COATED ORAL DAILY
Qty: 90 TABLET | Refills: 3 | Status: SHIPPED | OUTPATIENT
Start: 2022-05-03

## 2022-05-03 RX ORDER — ERGOCALCIFEROL 1.25 MG/1
50000 CAPSULE ORAL WEEKLY
Qty: 12 CAPSULE | Refills: 0 | Status: SHIPPED | OUTPATIENT
Start: 2022-05-03 | End: 2022-07-26

## 2022-05-03 RX ORDER — OMEPRAZOLE 20 MG/1
20 CAPSULE, DELAYED RELEASE ORAL DAILY
Qty: 14 CAPSULE | Refills: 0 | Status: SHIPPED | OUTPATIENT
Start: 2022-05-03 | End: 2022-06-02 | Stop reason: SDUPTHER

## 2022-05-03 RX ORDER — EMPAGLIFLOZIN 10 MG/1
TABLET, FILM COATED ORAL
Qty: 90 TABLET | Refills: 3 | Status: SHIPPED | OUTPATIENT
Start: 2022-05-03

## 2022-05-03 RX ORDER — POTASSIUM CHLORIDE 1.5 G/1.77G
20 POWDER, FOR SOLUTION ORAL DAILY
Qty: 30 EACH | Refills: 2 | Status: SHIPPED | OUTPATIENT
Start: 2022-05-03 | End: 2022-05-05

## 2022-05-03 ASSESSMENT — ENCOUNTER SYMPTOMS
DIARRHEA: 0
ABDOMINAL PAIN: 0
CONSTIPATION: 0
NAUSEA: 0
RHINORRHEA: 0
ABDOMINAL DISTENTION: 0
COUGH: 0
EYE ITCHING: 0
EYE DISCHARGE: 0
WHEEZING: 0
SHORTNESS OF BREATH: 0
BACK PAIN: 0

## 2022-05-03 NOTE — PROGRESS NOTES
[facility] PRIMARY CARE  [address]   [address]  [address] [address]  Dept: [phone]  Dept Fax: [phone]    John Pedersen is a 49 y.o. female who presents today for her medical conditions/complaints as noted below. Chief Complaint   Patient presents with    6 Month Follow-Up       HPI:     49 y. o. F presented to office today as a follow-up visit. Her main concern was heartburn that she has been experiencing since few days. Advised lifestyle changes. Her other chronic comorbidities including essential hypertension, prediabetes, CAD s/p CABG and systolic heart failure(EF 15%) s/p AICD are stable  Follows up with cardiology. She is on Coumadin for previous history of DVT. Follows up with Coumadin clinic. No other current complaints     Advised lifestyle changes. Medications reviewed and refilled as appropriate, problem list updated. All concerns discussed in details and all questions answered to patient satisfaction.         Hemoglobin A1C (%)   Date Value   12/06/2021 6.0   03/11/2021 6.4 (H)             ( goal A1C is < 7)   No results found for: LABMICR  LDL Cholesterol (mg/dL)   Date Value   05/04/2021 120   03/11/2021 154 (H)       (goal LDL is <100)   AST (U/L)   Date Value   12/06/2021 17     ALT (U/L)   Date Value   12/06/2021 14     BUN (mg/dL)   Date Value   03/28/2022 13     BP Readings from Last 3 Encounters:   05/03/22 108/72   11/03/21 104/66   09/01/21 102/64          (goal 120/80)    Past Medical History:   Diagnosis Date    CAD (coronary artery disease)     GERD (gastroesophageal reflux disease)     Hyperlipidemia     Hypertension     Prediabetes       Past Surgical History:   Procedure Laterality Date    CARDIAC DEFIBRILLATOR PLACEMENT  05/03/2021    CORONARY ARTERY BYPASS GRAFT  2004    EYE SURGERY  1990    Eye Socket    SHOULDER SURGERY Right 1994    rods and pins placed    TONSILLECTOMY         Family History Problem Relation Age of Onset    Heart Disease Father     Colon Cancer Brother     Heart Attack Mother        Social History     Tobacco Use    Smoking status: Former Smoker     Packs/day: 1.00     Years: 25.00     Pack years: 25.00     Types: Cigarettes     Quit date: 01/2007     Years since quitting: 15.3    Smokeless tobacco: Never Used   Substance Use Topics    Alcohol use: Not Currently      Current Outpatient Medications   Medication Sig Dispense Refill    vitamin D (ERGOCALCIFEROL) 1.25 MG (87313 UT) CAPS capsule Take 1 capsule by mouth once a week 12 capsule 0    metoprolol succinate (TOPROL XL) 25 MG extended release tablet Take 2 tablets by mouth 2 times daily 90 tablet 3    empagliflozin (JARDIANCE) 10 MG tablet Take 1 tablet by mouth once daily 90 tablet 3    atorvastatin (LIPITOR) 40 MG tablet Take 1 tablet by mouth daily 90 tablet 3    potassium chloride (KLOR-CON) 20 MEQ packet Take 20 mEq by mouth daily 30 each 2    furosemide (LASIX) 40 MG tablet Take 1 tablet by mouth daily 60 tablet 2    omeprazole (PRILOSEC) 20 MG delayed release capsule Take 1 capsule by mouth Daily for 14 days 14 capsule 0    warfarin (COUMADIN) 5 MG tablet Take one-half to 1 tablet by mouth once daily as directed by Medication Management Clinic. 90 tablet 1    sacubitril-valsartan (ENTRESTO) 24-26 MG per tablet Take 1 tablet by mouth 2 times daily      Handicap Placard MISC by Does not apply route For 5 years 1 each 0     No current facility-administered medications for this visit.      No Known Allergies    Health Maintenance   Topic Date Due    HIV screen  Never done    Hepatitis C screen  Never done    DTaP/Tdap/Td vaccine (1 - Tdap) Never done    Cervical cancer screen  Never done    Shingles vaccine (1 of 2) Never done    Lipids  05/04/2022    Pneumococcal 0-64 years Vaccine (1 - PCV) 05/03/2022 (Originally 7/3/1976)    Depression Screen  11/03/2022    A1C test (Diabetic or Prediabetic) 12/06/2022    Potassium  03/28/2023    Creatinine  03/28/2023    Breast cancer screen  10/05/2023    Colorectal Cancer Screen  10/27/2024    Flu vaccine  Completed    COVID-19 Vaccine  Completed    Hepatitis A vaccine  Aged Out    Hepatitis B vaccine  Aged Out    Hib vaccine  Aged Out    Meningococcal (ACWY) vaccine  Aged Out       Subjective:      Review of Systems   Constitutional: Negative for chills, fatigue and fever. Obese   HENT: Negative for congestion, hearing loss and rhinorrhea. Eyes: Negative for discharge and itching. Respiratory: Negative for cough, shortness of breath and wheezing. Cardiovascular: Negative for chest pain, palpitations and leg swelling. Gastrointestinal: Negative for abdominal distention, abdominal pain, constipation, diarrhea and nausea. Heartburn   Endocrine: Negative for polydipsia and polyphagia. Genitourinary: Negative for difficulty urinating, dyspareunia, dysuria, flank pain, frequency, hematuria, menstrual problem, urgency, vaginal bleeding, vaginal discharge and vaginal pain. Musculoskeletal: Negative for arthralgias and back pain. Skin: Negative for rash and wound. Neurological: Negative for dizziness, seizures, light-headedness and headaches. Psychiatric/Behavioral: Negative for agitation and behavioral problems. Objective:     Physical Exam  Constitutional:       Appearance: She is well-developed. She is obese. HENT:      Head: Normocephalic and atraumatic. Eyes:      Conjunctiva/sclera: Conjunctivae normal.      Pupils: Pupils are equal, round, and reactive to light. Neck:      Thyroid: No thyromegaly. Vascular: No JVD. Cardiovascular:      Rate and Rhythm: Normal rate and regular rhythm. Heart sounds: Normal heart sounds. No murmur heard. Pulmonary:      Effort: Pulmonary effort is normal.      Breath sounds: Normal breath sounds. No wheezing.    Abdominal:      General: Bowel sounds are normal. There is no distension. Palpations: Abdomen is soft. Tenderness: There is no abdominal tenderness. There is no rebound. Musculoskeletal:         General: No tenderness. Normal range of motion. Cervical back: Normal range of motion and neck supple. Neurological:      Mental Status: She is alert and oriented to person, place, and time. Cranial Nerves: No cranial nerve deficit. Psychiatric:         Behavior: Behavior normal.       /72   Pulse 70   Resp 16   Wt 244 lb 3.2 oz (110.8 kg)   SpO2 99%   BMI 37.13 kg/m²     Assessment:        Diagnoses and all orders for this visit:  Gastroesophageal reflux disease without esophagitis  -     omeprazole (PRILOSEC) 20 MG delayed release capsule; Take 1 capsule by mouth Daily for 14 days  Chronic systolic congestive heart failure (HCC)  -     metoprolol succinate (TOPROL XL) 25 MG extended release tablet; Take 2 tablets by mouth 2 times daily  -     empagliflozin (JARDIANCE) 10 MG tablet; Take 1 tablet by mouth once daily  -     atorvastatin (LIPITOR) 40 MG tablet; Take 1 tablet by mouth daily  -     potassium chloride (KLOR-CON) 20 MEQ packet; Take 20 mEq by mouth daily  -     furosemide (LASIX) 40 MG tablet; Take 1 tablet by mouth daily  Vitamin D deficiency  -     vitamin D (ERGOCALCIFEROL) 1.25 MG (33477 UT) CAPS capsule; Take 1 capsule by mouth once a week  Essential hypertension-stable  S/P CABG (coronary artery bypass graft)-stable follows up with cardiology  Prediabetes-stable  S/P internal cardiac defibrillator procedure-stable. Follows up with cardiology    Plan:      Return in about 6 months (around 11/3/2022) for labs. No orders of the defined types were placed in this encounter.     Orders Placed This Encounter   Medications    vitamin D (ERGOCALCIFEROL) 1.25 MG (89039 UT) CAPS capsule     Sig: Take 1 capsule by mouth once a week     Dispense:  12 capsule     Refill:  0    metoprolol succinate (TOPROL XL) 25 MG extended release tablet     Sig: Take 2 tablets by mouth 2 times daily     Dispense:  90 tablet     Refill:  3    empagliflozin (JARDIANCE) 10 MG tablet     Sig: Take 1 tablet by mouth once daily     Dispense:  90 tablet     Refill:  3    atorvastatin (LIPITOR) 40 MG tablet     Sig: Take 1 tablet by mouth daily     Dispense:  90 tablet     Refill:  3    potassium chloride (KLOR-CON) 20 MEQ packet     Sig: Take 20 mEq by mouth daily     Dispense:  30 each     Refill:  2    furosemide (LASIX) 40 MG tablet     Sig: Take 1 tablet by mouth daily     Dispense:  60 tablet     Refill:  2    omeprazole (PRILOSEC) 20 MG delayed release capsule     Sig: Take 1 capsule by mouth Daily for 14 days     Dispense:  14 capsule     Refill:  0         Patient given educational materials - see patient instructions. Discussed use, benefit, and side effects of prescribedmedications. All patient questions answered. Pt voiced understanding. Reviewed health maintenance. Instructed to continue current medications, diet and exercise. Patient agreed with treatment plan. Follow up as directed. I spent a total of 30-39 minutes face to face with this patient. Over 50% of that time was spent on counseling and care coordination. Please see assessment and plan for details. Electronically signed by   Judy Wynne MD on 5/3/2022 at 2:48 PM  [facility]. Please note that this chart was generated using voice recognition Dragon dictation software. Although every effort was made to ensure the accuracy of this automatedtranscription, some errors in transcription may have occurred.

## 2022-05-05 DIAGNOSIS — I50.22 CHRONIC SYSTOLIC CONGESTIVE HEART FAILURE (HCC): ICD-10-CM

## 2022-05-05 RX ORDER — POTASSIUM CHLORIDE 1500 MG/1
TABLET, FILM COATED, EXTENDED RELEASE ORAL
Qty: 60 TABLET | Refills: 2 | Status: CANCELLED | OUTPATIENT
Start: 2022-05-05

## 2022-05-05 RX ORDER — POTASSIUM CHLORIDE 1500 MG/1
TABLET, FILM COATED, EXTENDED RELEASE ORAL
COMMUNITY
Start: 2022-04-28 | End: 2022-05-10 | Stop reason: SDUPTHER

## 2022-05-09 ENCOUNTER — HOSPITAL ENCOUNTER (OUTPATIENT)
Dept: PHARMACY | Age: 52
Setting detail: THERAPIES SERIES
Discharge: HOME | End: 2022-05-09
Payer: COMMERCIAL

## 2022-05-09 DIAGNOSIS — Z86.718 HX OF DEEP VENOUS THROMBOSIS: Primary | ICD-10-CM

## 2022-05-09 LAB
INR BLD: 2.6
PROTIME: 31.6 SECONDS

## 2022-05-09 PROCEDURE — 85610 PROTHROMBIN TIME: CPT

## 2022-05-09 PROCEDURE — 99211 OFF/OP EST MAY X REQ PHY/QHP: CPT

## 2022-05-09 NOTE — PROGRESS NOTES
Medication Management Service, Warfarin Management  [facility] Medication Management, [phone]  Visit Date: 5/9/2022   Subjective:   Elise Snellen is a 49 y.o. female who presents to clinic today for anticoagulation monitoring and adjustment. Patient was referred for warfarin management due to  Indication:   DVT. INR goal: of 2.0-3.0. Duration of therapy: indefinite. Patient reports the following:   Adherent with regimen:  Yes  Missed or extra doses:  None   Bleeding or thromboembolic side effects:  None  Significant medication, dietary, alcohol, or tobacco changes:  None  Significant recent illness, disease state changes, or hospitalization:  None  Upcoming surgeries or procedures:  None           Assessment and PLAN   PT/INR done in office per protocol. INR today is 2.6, therapeutic. Plan:  Instructed the patient to continue the current warfarin regimen of 2.5 mg Tues, Thur, Sat and 5 mg on all other days. Using warfarin 5 mg tablets. Recheck INR in 4 week(s). Patient verbalized understanding of dosing directions and information discussed. Dosing schedule given to patient. Progress note sent to referring office. Patient acknowledges working in consult agreement with pharmacist as referred by his/her physician.       Electronically signed by Nani Hernandez [facility] on 5/9/22 at 11:05 AM EDT    For Pharmacy Admin Tracking Only     Intervention Detail:    Total # of Interventions Recommended: 0   Total # of Interventions Accepted: 0   Time Spent (min): 15

## 2022-05-10 RX ORDER — POTASSIUM CHLORIDE 1500 MG/1
TABLET, FILM COATED, EXTENDED RELEASE ORAL
Qty: 90 TABLET | Refills: 1 | Status: SHIPPED | OUTPATIENT
Start: 2022-05-10

## 2022-06-02 DIAGNOSIS — K21.9 GASTROESOPHAGEAL REFLUX DISEASE WITHOUT ESOPHAGITIS: ICD-10-CM

## 2022-06-02 DIAGNOSIS — I50.22 CHRONIC SYSTOLIC CONGESTIVE HEART FAILURE (HCC): ICD-10-CM

## 2022-06-03 RX ORDER — METOPROLOL SUCCINATE 25 MG/1
50 TABLET, EXTENDED RELEASE ORAL 2 TIMES DAILY
Qty: 120 TABLET | Refills: 3 | Status: SHIPPED | OUTPATIENT
Start: 2022-06-03

## 2022-06-03 RX ORDER — OMEPRAZOLE 20 MG/1
20 CAPSULE, DELAYED RELEASE ORAL DAILY
Qty: 30 CAPSULE | Refills: 1 | Status: SHIPPED | OUTPATIENT
Start: 2022-06-03 | End: 2022-08-12 | Stop reason: SDUPTHER

## 2022-06-06 ENCOUNTER — HOSPITAL ENCOUNTER (OUTPATIENT)
Dept: PHARMACY | Age: 52
Setting detail: THERAPIES SERIES
Discharge: HOME | End: 2022-06-06
Payer: COMMERCIAL

## 2022-06-06 DIAGNOSIS — Z86.718 HX OF DEEP VENOUS THROMBOSIS: Primary | ICD-10-CM

## 2022-06-06 LAB
INR BLD: 3.5
PROTIME: 42 SECONDS

## 2022-06-06 PROCEDURE — 85610 PROTHROMBIN TIME: CPT

## 2022-06-06 PROCEDURE — 99212 OFFICE O/P EST SF 10 MIN: CPT

## 2022-06-06 NOTE — PROGRESS NOTES
Medication Management Service, Warfarin Management  [facility] Medication Management, [phone]  Visit Date: 6/6/2022   Subjective:   Ranjana Connelly is a 49 y.o. female who presents to clinic today for anticoagulation monitoring and adjustment. Patient was referred for warfarin management due to  Indication:   DVT. INR goal: of 2.0-3.0. Duration of therapy: indefinite. Patient reports the following:   Adherent with regimen:  Yes  Missed or extra doses:  Forgot to take a dose on Saturday, but took an extra half tab on a Monday. Bleeding or thromboembolic side effects: The patient reports she has had a little bit of bruising, but relates it to hitting herself on something. The bruise is improving. Significant medication, dietary, alcohol, or tobacco changes: The patient did have alcohol 2-3 weeks ago. Significant recent illness, disease state changes, or hospitalization:  None  Upcoming surgeries or procedures:  None           Assessment and PLAN   PT/INR done in office per protocol. INR today is 3.5, supratherapeutic. Plan:  Instructed the patient to take a decreased dose of 2.5 mg tonight, then continue with the current warfarin regimen of 2.5 mg Tues, Thur, Sat and 5 mg on all other days. Using warfarin 5 mg tablets. Recheck INR in 2 week(s). Patient verbalized understanding of dosing directions and information discussed. Dosing schedule given to patient. Progress note sent to referring office. Patient acknowledges working in consult agreement with pharmacist as referred by his/her physician.       Electronically signed by Sheyla Hall [place] on 6/6/22 at 11:23 AM EDT    For Pharmacy Admin Tracking Only     Intervention Detail: Dose Adjustment: 1, reason: Therapy Optimization   Total # of Interventions Recommended: 1   Total # of Interventions Accepted: 1   Time Spent (min): 15

## 2022-06-20 ENCOUNTER — HOSPITAL ENCOUNTER (OUTPATIENT)
Dept: PHARMACY | Age: 52
Setting detail: THERAPIES SERIES
Discharge: HOME | End: 2022-06-20
Payer: COMMERCIAL

## 2022-06-20 DIAGNOSIS — Z86.718 HX OF DEEP VENOUS THROMBOSIS: Primary | ICD-10-CM

## 2022-06-20 LAB
INR BLD: 2.8
PROTIME: 33 SECONDS

## 2022-06-20 PROCEDURE — 99211 OFF/OP EST MAY X REQ PHY/QHP: CPT

## 2022-06-20 PROCEDURE — 85610 PROTHROMBIN TIME: CPT

## 2022-06-20 NOTE — PROGRESS NOTES
Medication Management Service, Warfarin Management  [facility] Medication Management, [phone]  Visit Date: 6/20/2022   Subjective:   Romeo Cochran is a 49 y.o. female who presents to clinic today for anticoagulation monitoring and adjustment.     Patient was referred for warfarin management due to  Indication:   DVT. INR goal: of 2.0-3.0. Duration of therapy: indefinite.     Patient reports the following:   Adherent with regimen:  Yes  Missed or extra doses:  None   Bleeding or thromboembolic side effects:  None  Significant medication, dietary, alcohol, or tobacco changes:  None  Significant recent illness, disease state changes, or hospitalization:  None  Upcoming surgeries or procedures:  None           Assessment and PLAN   PT/INR done in office per protocol. INR today is 2.8, therapeutic.     Plan:  Instructed the patient to continue the current warfarin regimen of 2.5 mg Tues, Thur, Sat and 5 mg on all other days. Using warfarin 5 mg tablets. Patient has been stable on dose, may consider moving to 6 week INR checks     Recheck INR in 4 week(s).      Patient verbalized understanding of dosing directions and information discussed. Dosing schedule given to patient. Progress note sent to referring office. Patient acknowledges working in consult agreement with pharmacist as referred by his/her physician.    [facility] Tracking Only     · Intervention Detail:   · Total # of Interventions Recommended: 0  · Total # of Interventions Accepted: 0  · Time Spent (min): 15       Milagros Navarrete,   Maria Isabel  6/20/2022 11:15 AM

## 2022-07-18 ENCOUNTER — HOSPITAL ENCOUNTER (OUTPATIENT)
Dept: PHARMACY | Age: 52
Setting detail: THERAPIES SERIES
Discharge: HOME | End: 2022-07-18
Payer: COMMERCIAL

## 2022-07-18 LAB
INR BLD: 3.6
PROTIME: 43 SECONDS

## 2022-07-18 PROCEDURE — 85610 PROTHROMBIN TIME: CPT

## 2022-07-18 PROCEDURE — 99212 OFFICE O/P EST SF 10 MIN: CPT

## 2022-07-18 NOTE — PROGRESS NOTES
Medication Management Service, Warfarin Management  [facility] [phone]  Visit Date: 7/18/2022   Subjective:   Salvador Buckner is a 47 y.o. female who presents to clinic today for anticoagulation monitoring and adjustment. Patient seen in clinic for warfarin management due to  Indication:   DVT. INR goal: of 2.0-3.0. Duration of therapy: indefinite. Assessment and PLAN   PT/INR done in office per protocol. INR today is 3.6, supratherapeutic. Plan:  After discussion, the cause of the supratherapeutic INR could not be identified. Advised the patient to hold her dose today then to continue current regimen of warfarin 2.5 mg on Tuesday, Thursday, Saturday and 5 mg all other days of the week. Using warfarin 5 mg tablets. Recheck INR in two weeks. Patient seen at [address] [place] [address]  Patient attested to self screening for COVID - 19. Patient verbalized understanding of dosing directions and information discussed. Dosing schedule given to patient. Progress note sent to referring office. Patient acknowledges working in consult agreement with pharmacist as referred by his/her physician.       Electronically signed by CAR Colón FRANCO [facility] on 7/18/22 at 11:37 AM EDT     For Pharmacy Admin Tracking Only    Intervention Detail: Dose Adjustment: 1, reason: Therapy Optimization  Total # of Interventions Recommended: 1  Total # of Interventions Accepted: 1  Time Spent (min): 15

## 2022-07-25 RX ORDER — WARFARIN SODIUM 5 MG/1
TABLET ORAL
Qty: 12 TABLET | Refills: 0 | Status: SHIPPED | OUTPATIENT
Start: 2022-07-25 | End: 2022-09-09

## 2022-07-26 DIAGNOSIS — E55.9 VITAMIN D DEFICIENCY: ICD-10-CM

## 2022-07-26 RX ORDER — ERGOCALCIFEROL 1.25 MG/1
50000 CAPSULE ORAL WEEKLY
Qty: 12 CAPSULE | Refills: 3 | Status: SHIPPED | OUTPATIENT
Start: 2022-07-26

## 2022-07-27 DIAGNOSIS — K21.9 GASTROESOPHAGEAL REFLUX DISEASE WITHOUT ESOPHAGITIS: ICD-10-CM

## 2022-07-28 RX ORDER — OMEPRAZOLE 20 MG/1
CAPSULE, DELAYED RELEASE ORAL
Qty: 7 CAPSULE | Refills: 0 | OUTPATIENT
Start: 2022-07-28

## 2022-08-01 ENCOUNTER — HOSPITAL ENCOUNTER (OUTPATIENT)
Dept: PHARMACY | Age: 52
Setting detail: THERAPIES SERIES
Discharge: HOME | End: 2022-08-01
Payer: COMMERCIAL

## 2022-08-01 DIAGNOSIS — Z86.718 HX OF DEEP VENOUS THROMBOSIS: Primary | ICD-10-CM

## 2022-08-01 LAB
INR BLD: 2.8
PROTIME: 33.9 SECONDS

## 2022-08-01 PROCEDURE — 85610 PROTHROMBIN TIME: CPT

## 2022-08-01 PROCEDURE — 99211 OFF/OP EST MAY X REQ PHY/QHP: CPT

## 2022-08-01 NOTE — PROGRESS NOTES
Medication Management Service, Warfarin Management  [facility] Medication Management, [phone]  Visit Date: 8/1/2022   Subjective:   Kleber Dunham is a 47 y.o. female who presents to clinic today for anticoagulation monitoring and adjustment. Patient was referred for warfarin management due to  Indication:   DVT. INR goal: of 2.0-3.0. Duration of therapy: indefinite. Patient reports the following:   Adherent with regimen:  Yes  Missed or extra doses:  None   Bleeding or thromboembolic side effects:  None  Significant medication, dietary, alcohol, or tobacco changes:  None  Significant recent illness, disease state changes, or hospitalization:  None  Upcoming surgeries or procedures:   has a dental appointment to have a cavity filled. Assessment and PLAN   PT/INR done in office per protocol. INR today is 2.8, therapeutic. Plan:  Instructed the patient to continue the current warfarin regimen of 2.5 mg Tues, Thur, Sat, and 5 mg on all other days. Using warfarin 5 mg tablets. Recheck INR in 4 week(s). Patient verbalized understanding of dosing directions and information discussed. Dosing schedule given to patient. Progress note sent to referring office. Patient acknowledges working in consult agreement with pharmacist as referred by his/her physician.       Electronically signed by Murrel Cowden, [facility] on 8/1/22 at 11:17 AM EDT    For Pharmacy Admin Tracking Only    Intervention Detail:   Total # of Interventions Recommended: 0  Total # of Interventions Accepted: 0  Time Spent (min): 15

## 2022-08-02 RX ORDER — POTASSIUM CHLORIDE 20 MEQ/1
TABLET, EXTENDED RELEASE ORAL
Qty: 4 TABLET | Refills: 0 | OUTPATIENT
Start: 2022-08-02

## 2022-08-06 DIAGNOSIS — K21.9 GASTROESOPHAGEAL REFLUX DISEASE WITHOUT ESOPHAGITIS: ICD-10-CM

## 2022-08-08 RX ORDER — OMEPRAZOLE 20 MG/1
CAPSULE, DELAYED RELEASE ORAL
Qty: 7 CAPSULE | Refills: 0 | OUTPATIENT
Start: 2022-08-08

## 2022-08-08 NOTE — TELEPHONE ENCOUNTER
Patient has completed the course of therapy. If she continues to be symptomatic she needs to see me in office or make a virtual visit to discuss further treatment options depending upon the symptoms.   Thank you

## 2022-08-12 ENCOUNTER — OFFICE VISIT (OUTPATIENT)
Dept: PRIMARY CARE CLINIC | Age: 52
End: 2022-08-12
Payer: COMMERCIAL

## 2022-08-12 VITALS
DIASTOLIC BLOOD PRESSURE: 76 MMHG | WEIGHT: 243.5 LBS | HEART RATE: 92 BPM | OXYGEN SATURATION: 94 % | BODY MASS INDEX: 37.02 KG/M2 | SYSTOLIC BLOOD PRESSURE: 110 MMHG

## 2022-08-12 DIAGNOSIS — K21.9 GASTROESOPHAGEAL REFLUX DISEASE WITHOUT ESOPHAGITIS: ICD-10-CM

## 2022-08-12 PROCEDURE — 1036F TOBACCO NON-USER: CPT | Performed by: STUDENT IN AN ORGANIZED HEALTH CARE EDUCATION/TRAINING PROGRAM

## 2022-08-12 PROCEDURE — G8417 CALC BMI ABV UP PARAM F/U: HCPCS | Performed by: STUDENT IN AN ORGANIZED HEALTH CARE EDUCATION/TRAINING PROGRAM

## 2022-08-12 PROCEDURE — 99213 OFFICE O/P EST LOW 20 MIN: CPT | Performed by: STUDENT IN AN ORGANIZED HEALTH CARE EDUCATION/TRAINING PROGRAM

## 2022-08-12 PROCEDURE — 3017F COLORECTAL CA SCREEN DOC REV: CPT | Performed by: STUDENT IN AN ORGANIZED HEALTH CARE EDUCATION/TRAINING PROGRAM

## 2022-08-12 PROCEDURE — G8427 DOCREV CUR MEDS BY ELIG CLIN: HCPCS | Performed by: STUDENT IN AN ORGANIZED HEALTH CARE EDUCATION/TRAINING PROGRAM

## 2022-08-12 RX ORDER — OMEPRAZOLE 20 MG/1
20 CAPSULE, DELAYED RELEASE ORAL DAILY
Qty: 30 CAPSULE | Refills: 2 | Status: SHIPPED | OUTPATIENT
Start: 2022-08-12 | End: 2022-11-10

## 2022-08-12 RX ORDER — OMEPRAZOLE 20 MG/1
20 CAPSULE, DELAYED RELEASE ORAL DAILY
Qty: 30 CAPSULE | Refills: 0 | Status: SHIPPED | OUTPATIENT
Start: 2022-08-12 | End: 2022-08-12

## 2022-08-12 RX ORDER — OMEPRAZOLE 20 MG/1
20 CAPSULE, DELAYED RELEASE ORAL DAILY
Qty: 30 CAPSULE | Refills: 1 | Status: CANCELLED | OUTPATIENT
Start: 2022-08-12 | End: 2022-09-11

## 2022-08-12 ASSESSMENT — PATIENT HEALTH QUESTIONNAIRE - PHQ9
SUM OF ALL RESPONSES TO PHQ QUESTIONS 1-9: 0
2. FEELING DOWN, DEPRESSED OR HOPELESS: 0
SUM OF ALL RESPONSES TO PHQ QUESTIONS 1-9: 0
SUM OF ALL RESPONSES TO PHQ QUESTIONS 1-9: 0
SUM OF ALL RESPONSES TO PHQ9 QUESTIONS 1 & 2: 0
SUM OF ALL RESPONSES TO PHQ QUESTIONS 1-9: 0
1. LITTLE INTEREST OR PLEASURE IN DOING THINGS: 0

## 2022-08-12 ASSESSMENT — ENCOUNTER SYMPTOMS
COUGH: 0
ABDOMINAL PAIN: 0
SHORTNESS OF BREATH: 0

## 2022-08-12 NOTE — PROGRESS NOTES
[address] [place] [address] PRIMARY CARE  [address]  [address] [address]  Dept: [phone]  Dept Fax: [phone]    Angela Parra is a 47 y.o. female who presents today for her medical conditions/complaints as noted below. Chief Complaint   Patient presents with    Discuss Medications     Refill heartburn  medication       HPI:     47 y. o. F presented to office as a follow-up for heartburn. Patient mentioned that she has history of GERD since many years and is taking Prilosec for that. She has history of gastric bypass surgery in the past.  Mentioned that after her bypass surgery she started having this heartburn since many years. Never had any EGD in the past.  No significant family history of cancer. Advised weight loss and lifestyle changes. No other current complaints. Vital signs stable    Medications reviewed and refilled as appropriate, problem list updated. All concerns discussed in details and all questions answered to patient satisfaction.               Hemoglobin A1C (%)   Date Value   12/06/2021 6.0   03/11/2021 6.4 (H)             ( goal A1C is < 7)   No results found for: LABMICR  LDL Cholesterol (mg/dL)   Date Value   05/04/2021 120   03/11/2021 154 (H)       (goal LDL is <100)   AST (U/L)   Date Value   12/06/2021 17     ALT (U/L)   Date Value   12/06/2021 14     BUN (mg/dL)   Date Value   03/28/2022 13     BP Readings from Last 3 Encounters:   08/12/22 110/76   05/03/22 108/72   11/03/21 104/66          (goal 120/80)    Past Medical History:   Diagnosis Date    CAD (coronary artery disease)     GERD (gastroesophageal reflux disease)     Hyperlipidemia     Hypertension     Prediabetes       Past Surgical History:   Procedure Laterality Date    CARDIAC DEFIBRILLATOR PLACEMENT  05/03/2021    CORONARY ARTERY BYPASS GRAFT  2004    EYE SURGERY  1990    Eye Socket    SHOULDER SURGERY Right 1994    rods and pins placed    TONSILLECTOMY Family History   Problem Relation Age of Onset    Heart Disease Father     Colon Cancer Brother     Heart Attack Mother        Social History     Tobacco Use    Smoking status: Former     Packs/day: 1.00     Years: 25.00     Pack years: 25.00     Types: Cigarettes     Quit date: 01/2007     Years since quitting: 15.6    Smokeless tobacco: Never   Substance Use Topics    Alcohol use: Not Currently      Current Outpatient Medications   Medication Sig Dispense Refill    omeprazole (PRILOSEC) 20 MG delayed release capsule Take 1 capsule by mouth in the morning. 30 capsule 2    vitamin D (ERGOCALCIFEROL) 1.25 MG (35351 UT) CAPS capsule Take 1 capsule by mouth once a week 12 capsule 3    warfarin (COUMADIN) 5 MG tablet TAKE 1/2 TO 1 (ONE-HALF TO ONE) TABLET BY MOUTH ONCE DAILY AS DIRECTED BY  MEDICATION  MANAGEMENT  CLINIC 12 tablet 0    metoprolol succinate (TOPROL XL) 25 MG extended release tablet Take 2 tablets by mouth 2 times daily 120 tablet 3    potassium chloride (KLOR-CON M) 20 MEQ TBCR extended release tablet TAKE 1 TABLET BY MOUTH ONCE DAILY 90 tablet 1    empagliflozin (JARDIANCE) 10 MG tablet Take 1 tablet by mouth once daily 90 tablet 3    atorvastatin (LIPITOR) 40 MG tablet Take 1 tablet by mouth daily 90 tablet 3    furosemide (LASIX) 40 MG tablet Take 1 tablet by mouth daily 60 tablet 2    sacubitril-valsartan (ENTRESTO) 24-26 MG per tablet Take 1 tablet by mouth 2 times daily      Handicap Placard MISC by Does not apply route For 5 years 1 each 0     No current facility-administered medications for this visit.      No Known Allergies    Health Maintenance   Topic Date Due    Pneumococcal 0-64 years Vaccine (1 - PCV) Never done    DTaP/Tdap/Td vaccine (1 - Tdap) Never done    Cervical cancer screen  Never done    Shingles vaccine (1 of 2) Never done    COVID-19 Vaccine (4 - Booster for Pfizer series) 04/17/2022    Lipids  05/04/2022    Hepatitis C screen  08/12/2023 (Originally 7[birth year]3/1989)    HIV screen  08/12/2023 (Originally 7/3/1986)    Flu vaccine (1) 09/01/2022    Depression Screen  11/03/2022    A1C test (Diabetic or Prediabetic)  12/06/2022    Breast cancer screen  10/05/2023    Colorectal Cancer Screen  10/27/2024    Hepatitis A vaccine  Aged Out    Hepatitis B vaccine  Aged Out    Hib vaccine  Aged Out    Meningococcal (ACWY) vaccine  Aged Out       Subjective:      Review of Systems   Constitutional:  Negative for fatigue and fever. HENT:  Negative for congestion. Respiratory:  Negative for cough and shortness of breath. Cardiovascular:  Negative for chest pain and palpitations. Gastrointestinal:  Negative for abdominal pain. Positive for heartburn   Genitourinary:  Negative for flank pain and hematuria. Musculoskeletal:  Negative for arthralgias and myalgias. Skin:  Negative for rash. Allergic/Immunologic: Negative for environmental allergies. Neurological:  Negative for dizziness and light-headedness. Hematological:  Negative for adenopathy. Psychiatric/Behavioral:  The patient is not nervous/anxious. Objective:     Physical Exam  Constitutional:       Appearance: Normal appearance. She is obese. HENT:      Head: Normocephalic and atraumatic. Nose: No congestion. Cardiovascular:      Rate and Rhythm: Normal rate and regular rhythm. Pulses: Normal pulses. Heart sounds: Normal heart sounds. No murmur heard. Pulmonary:      Effort: Pulmonary effort is normal. No respiratory distress. Breath sounds: Normal breath sounds. Abdominal:      General: Bowel sounds are normal.      Palpations: Abdomen is soft. Tenderness: There is no abdominal tenderness. Musculoskeletal:         General: No swelling. Normal range of motion. Cervical back: Normal range of motion and neck supple. Skin:     Findings: No bruising. Neurological:      General: No focal deficit present.       Mental Status: She is alert and oriented to person, place, and time.      Cranial Nerves: No cranial nerve deficit. Psychiatric:         Mood and Affect: Mood normal.         Behavior: Behavior normal.         Thought Content: Thought content normal.     /76   Pulse 92   Wt 243 lb 8 oz (110.5 kg)   SpO2 94%   BMI 37.02 kg/m²     Assessment:          1. Gastroesophageal reflux disease without esophagitis    - Shantal - Leo Lara MD, Gastroenterology, [place]  - omeprazole (PRILOSEC) 20 MG delayed release capsule; Take 1 capsule by mouth in the morning. Dispense: 30 capsule; Refill: 2              Plan:      Return in about 3 months (around 11/12/2022). Orders Placed This Encounter   Procedures    [facility]     Referral Priority:   Routine     Referral Type:   Eval and Treat     Referral Reason:   Specialty Services Required     Referred to Provider:   Fiorella Salinas MD     Requested Specialty:   Gastroenterology     Number of Visits Requested:   1     Orders Placed This Encounter   Medications    DISCONTD: omeprazole (PRILOSEC) 20 MG delayed release capsule     Sig: Take 1 capsule by mouth in the morning. Dispense:  30 capsule     Refill:  0    omeprazole (PRILOSEC) 20 MG delayed release capsule     Sig: Take 1 capsule by mouth in the morning. Dispense:  30 capsule     Refill:  2         Patient given educational materials - see patient instructions. Discussed use, benefit, and side effects of prescribedmedications. All patient questions answered. Pt voiced understanding. Reviewed health maintenance. Instructed to continue current medications, diet and exercise. Patient agreed with treatment plan. Follow up as directed. I spent a total of 20-29 minutes face to face with this patient. Over 50% of that time was spent on counseling and care coordination. Please see assessment and plan for details.       Electronically signed by   Krysta Boyle MD on 8/12/2022 at 2:31 PM  [facility].        Please note that this chart was generated using voice recognition Dragon dictation software. Although every effort was made to ensure the accuracy of this automatedtranscription, some errors in transcription may have occurred.

## 2022-08-22 ENCOUNTER — TELEPHONE (OUTPATIENT)
Dept: PRIMARY CARE CLINIC | Age: 52
End: 2022-08-22

## 2022-08-29 ENCOUNTER — HOSPITAL ENCOUNTER (OUTPATIENT)
Dept: PHARMACY | Age: 52
Setting detail: THERAPIES SERIES
Discharge: HOME | End: 2022-08-29
Payer: COMMERCIAL

## 2022-08-29 LAB
INR BLD: 1.5
PROTIME: 17.4 SECONDS

## 2022-08-29 PROCEDURE — 99212 OFFICE O/P EST SF 10 MIN: CPT

## 2022-08-29 PROCEDURE — 85610 PROTHROMBIN TIME: CPT

## 2022-08-29 PROCEDURE — 99211 OFF/OP EST MAY X REQ PHY/QHP: CPT

## 2022-08-29 NOTE — PROGRESS NOTES
Medication Management Service, Warfarin Management  [facility] [phone]  Visit Date: 8/29/2022   Subjective:   Angela Parra is a 47 y.o. female who presents to clinic today for anticoagulation monitoring and adjustment. Patient seen in clinic for warfarin management due to  Indication:   DVT. INR goal: of 2.0-3.0. Duration of therapy: indefinite. Assessment and PLAN   PT/INR done in office per protocol. INR today is 1.5, subtherapeutic. Plan: The patient presents subtherapeutic today d/t a missed dose on Saturday. Will boost the patient with an extra 2.5 mg today then will continue current regimen of warfarin 2.5 mg on Tuesday, Thursday, Saturday and 5 mg all other days of the week. Using warfarin 5 mg tablets. Recheck INR in 2 weeks. Patient seen at [address] [place] [address]  Patient attested to self screening for COVID - 19. Patient verbalized understanding of dosing directions and information discussed. Dosing schedule given to patient. Progress note sent to referring office. Patient acknowledges working in consult agreement with pharmacist as referred by his/her physician.       Electronically signed by CAR [facility] on 8/29/22 at 11:33 AM EDT     For Pharmacy Admin Tracking Only    Intervention Detail: Dose Adjustment: 1, reason: Therapy Optimization  Total # of Interventions Recommended: 1  Total # of Interventions Accepted: 1  Time Spent (min): 15

## 2022-09-09 RX ORDER — WARFARIN SODIUM 5 MG/1
TABLET ORAL
Qty: 60 TABLET | Refills: 3 | Status: SHIPPED | OUTPATIENT
Start: 2022-09-09

## 2022-09-12 ENCOUNTER — HOSPITAL ENCOUNTER (OUTPATIENT)
Dept: PHARMACY | Age: 52
Setting detail: THERAPIES SERIES
Discharge: HOME | End: 2022-09-12
Payer: COMMERCIAL

## 2022-09-12 DIAGNOSIS — Z86.718 HX OF DEEP VENOUS THROMBOSIS: Primary | ICD-10-CM

## 2022-09-12 LAB
INR BLD: 2.4
PROTIME: 28.7 SECONDS

## 2022-09-12 PROCEDURE — 99211 OFF/OP EST MAY X REQ PHY/QHP: CPT

## 2022-09-12 PROCEDURE — 85610 PROTHROMBIN TIME: CPT

## 2022-09-12 NOTE — PROGRESS NOTES
Medication Management Service, Warfarin Management  [facility] Medication Management, [phone]  Visit Date: 9/12/2022   Subjective:   Rajan Jane is a 47 y.o. female who presents to clinic today for anticoagulation monitoring and adjustment. Patient was referred for warfarin management due to  Indication:   DVT. INR goal: of 2.0-3.0. Duration of therapy: indefinite. Patient reports the following:   Adherent with regimen:  Yes  Missed or extra doses:  None   Bleeding or thromboembolic side effects:  None  Significant medication, dietary, alcohol, or tobacco changes:  None  Significant recent illness, disease state changes, or hospitalization:  None  Upcoming surgeries or procedures:  None           Assessment and PLAN   PT/INR done in office per protocol. INR today is 2.4, therapeutic. Plan:  Instructed the patient to continue the current warfarin regimen of 2.5 mg Tues, Thur, Sat, and 5 mg on all other days. Using warfarin 5 mg tablets. Recheck INR in 4 week(s). Patient verbalized understanding of dosing directions and information discussed. Dosing schedule given to patient. Progress note sent to referring office. Patient acknowledges working in consult agreement with pharmacist as referred by his/her physician.       Electronically signed by CAR [facility] on 9/12/22 at 11:00 AM EDT    For Pharmacy Admin Tracking Only    Intervention Detail:   Total # of Interventions Recommended: 0  Total # of Interventions Accepted: 0  Time Spent (min): 15

## 2022-10-10 ENCOUNTER — HOSPITAL ENCOUNTER (OUTPATIENT)
Dept: PHARMACY | Age: 52
Setting detail: THERAPIES SERIES
Discharge: HOME | End: 2022-10-10
Payer: COMMERCIAL

## 2022-10-10 DIAGNOSIS — Z86.718 HX OF DEEP VENOUS THROMBOSIS: Primary | ICD-10-CM

## 2022-10-10 LAB
INR BLD: 2.5
PROTIME: 29.7 SECONDS

## 2022-10-10 PROCEDURE — 99211 OFF/OP EST MAY X REQ PHY/QHP: CPT

## 2022-10-10 PROCEDURE — 85610 PROTHROMBIN TIME: CPT

## 2022-10-10 NOTE — PROGRESS NOTES
Medication Management Service, Warfarin Management  [facility] Medication Management, [phone]  Visit Date: 10/10/2022   Subjective:   Maureen Truong is a 47 y.o. female who presents to clinic today for anticoagulation monitoring and adjustment. Patient was referred for warfarin management due to  Indication:   DVT. INR goal: of 2.0-3.0. Duration of therapy: indefinite. Patient reports the following:   Adherent with regimen:  Yes  Missed or extra doses:  None   Bleeding or thromboembolic side effects: The patient reports some bruising and arm soreness of her left arm. The bruise has healed. Significant medication, dietary, alcohol, or tobacco changes:  None  Significant recent illness, disease state changes, or hospitalization:  None  Upcoming surgeries or procedures:  None           Assessment and PLAN   PT/INR done in office per protocol. INR today is 2.5, therapeutic. Plan:  Instructed the patient to continue the current warfarin regimen of 2.5 mg Tues, Thur, Sat, and 5 mg on all other days. Using warfarin 5 mg tablets. Recheck INR in 5 week(s). Patient verbalized understanding of dosing directions and information discussed. Dosing schedule given to patient. Progress note sent to referring office. Patient acknowledges working in consult agreement with pharmacist as referred by his/her physician.       Electronically signed by Heike Tang [facility] on 10/10/22 at 11:42 AM EDT    For Pharmacy Admin Tracking Only    Intervention Detail:   Total # of Interventions Recommended: 0  Total # of Interventions Accepted: 0  Time Spent (min): 15

## 2022-11-08 ENCOUNTER — OFFICE VISIT (OUTPATIENT)
Dept: PRIMARY CARE CLINIC | Age: 52
End: 2022-11-08
Payer: COMMERCIAL

## 2022-11-08 VITALS
HEART RATE: 74 BPM | HEIGHT: 68 IN | WEIGHT: 249 LBS | RESPIRATION RATE: 16 BRPM | OXYGEN SATURATION: 98 % | DIASTOLIC BLOOD PRESSURE: 78 MMHG | BODY MASS INDEX: 37.74 KG/M2 | SYSTOLIC BLOOD PRESSURE: 122 MMHG

## 2022-11-08 DIAGNOSIS — Z12.31 SCREENING MAMMOGRAM FOR BREAST CANCER: ICD-10-CM

## 2022-11-08 DIAGNOSIS — E55.9 VITAMIN D DEFICIENCY: ICD-10-CM

## 2022-11-08 DIAGNOSIS — I50.22 CHRONIC SYSTOLIC CONGESTIVE HEART FAILURE (HCC): ICD-10-CM

## 2022-11-08 DIAGNOSIS — Z23 NEED FOR INFLUENZA VACCINATION: ICD-10-CM

## 2022-11-08 DIAGNOSIS — E78.2 MIXED HYPERLIPIDEMIA: ICD-10-CM

## 2022-11-08 DIAGNOSIS — R73.03 PREDIABETES: ICD-10-CM

## 2022-11-08 DIAGNOSIS — I10 ESSENTIAL HYPERTENSION: ICD-10-CM

## 2022-11-08 DIAGNOSIS — Z95.1 S/P CABG (CORONARY ARTERY BYPASS GRAFT): ICD-10-CM

## 2022-11-08 DIAGNOSIS — Z86.718 HX OF DEEP VENOUS THROMBOSIS: ICD-10-CM

## 2022-11-08 DIAGNOSIS — R53.82 CHRONIC FATIGUE: ICD-10-CM

## 2022-11-08 DIAGNOSIS — R73.09 IMPAIRED GLUCOSE METABOLISM: ICD-10-CM

## 2022-11-08 DIAGNOSIS — K21.9 GASTROESOPHAGEAL REFLUX DISEASE WITHOUT ESOPHAGITIS: Primary | ICD-10-CM

## 2022-11-08 PROCEDURE — 90674 CCIIV4 VAC NO PRSV 0.5 ML IM: CPT | Performed by: STUDENT IN AN ORGANIZED HEALTH CARE EDUCATION/TRAINING PROGRAM

## 2022-11-08 PROCEDURE — G8427 DOCREV CUR MEDS BY ELIG CLIN: HCPCS | Performed by: STUDENT IN AN ORGANIZED HEALTH CARE EDUCATION/TRAINING PROGRAM

## 2022-11-08 PROCEDURE — 90471 IMMUNIZATION ADMIN: CPT | Performed by: STUDENT IN AN ORGANIZED HEALTH CARE EDUCATION/TRAINING PROGRAM

## 2022-11-08 PROCEDURE — 3078F DIAST BP <80 MM HG: CPT | Performed by: STUDENT IN AN ORGANIZED HEALTH CARE EDUCATION/TRAINING PROGRAM

## 2022-11-08 PROCEDURE — G8417 CALC BMI ABV UP PARAM F/U: HCPCS | Performed by: STUDENT IN AN ORGANIZED HEALTH CARE EDUCATION/TRAINING PROGRAM

## 2022-11-08 PROCEDURE — 99214 OFFICE O/P EST MOD 30 MIN: CPT | Performed by: STUDENT IN AN ORGANIZED HEALTH CARE EDUCATION/TRAINING PROGRAM

## 2022-11-08 PROCEDURE — 1036F TOBACCO NON-USER: CPT | Performed by: STUDENT IN AN ORGANIZED HEALTH CARE EDUCATION/TRAINING PROGRAM

## 2022-11-08 PROCEDURE — 3017F COLORECTAL CA SCREEN DOC REV: CPT | Performed by: STUDENT IN AN ORGANIZED HEALTH CARE EDUCATION/TRAINING PROGRAM

## 2022-11-08 PROCEDURE — 3074F SYST BP LT 130 MM HG: CPT | Performed by: STUDENT IN AN ORGANIZED HEALTH CARE EDUCATION/TRAINING PROGRAM

## 2022-11-08 PROCEDURE — G8482 FLU IMMUNIZE ORDER/ADMIN: HCPCS | Performed by: STUDENT IN AN ORGANIZED HEALTH CARE EDUCATION/TRAINING PROGRAM

## 2022-11-08 RX ORDER — OMEPRAZOLE 20 MG/1
20 CAPSULE, DELAYED RELEASE ORAL DAILY
Qty: 30 CAPSULE | Refills: 5 | Status: SHIPPED | OUTPATIENT
Start: 2022-11-08

## 2022-11-08 SDOH — ECONOMIC STABILITY: FOOD INSECURITY: WITHIN THE PAST 12 MONTHS, YOU WORRIED THAT YOUR FOOD WOULD RUN OUT BEFORE YOU GOT MONEY TO BUY MORE.: NEVER TRUE

## 2022-11-08 SDOH — ECONOMIC STABILITY: FOOD INSECURITY: WITHIN THE PAST 12 MONTHS, THE FOOD YOU BOUGHT JUST DIDN'T LAST AND YOU DIDN'T HAVE MONEY TO GET MORE.: NEVER TRUE

## 2022-11-08 ASSESSMENT — PATIENT HEALTH QUESTIONNAIRE - PHQ9
1. LITTLE INTEREST OR PLEASURE IN DOING THINGS: 1
SUM OF ALL RESPONSES TO PHQ QUESTIONS 1-9: 6
7. TROUBLE CONCENTRATING ON THINGS, SUCH AS READING THE NEWSPAPER OR WATCHING TELEVISION: 0
3. TROUBLE FALLING OR STAYING ASLEEP: 2
SUM OF ALL RESPONSES TO PHQ QUESTIONS 1-9: 6
2. FEELING DOWN, DEPRESSED OR HOPELESS: 2
SUM OF ALL RESPONSES TO PHQ QUESTIONS 1-9: 6
8. MOVING OR SPEAKING SO SLOWLY THAT OTHER PEOPLE COULD HAVE NOTICED. OR THE OPPOSITE, BEING SO FIGETY OR RESTLESS THAT YOU HAVE BEEN MOVING AROUND A LOT MORE THAN USUAL: 0
10. IF YOU CHECKED OFF ANY PROBLEMS, HOW DIFFICULT HAVE THESE PROBLEMS MADE IT FOR YOU TO DO YOUR WORK, TAKE CARE OF THINGS AT HOME, OR GET ALONG WITH OTHER PEOPLE: 1
6. FEELING BAD ABOUT YOURSELF - OR THAT YOU ARE A FAILURE OR HAVE LET YOURSELF OR YOUR FAMILY DOWN: 0
9. THOUGHTS THAT YOU WOULD BE BETTER OFF DEAD, OR OF HURTING YOURSELF: 0
5. POOR APPETITE OR OVEREATING: 0
SUM OF ALL RESPONSES TO PHQ9 QUESTIONS 1 & 2: 3
SUM OF ALL RESPONSES TO PHQ QUESTIONS 1-9: 6
4. FEELING TIRED OR HAVING LITTLE ENERGY: 1

## 2022-11-08 ASSESSMENT — ENCOUNTER SYMPTOMS
ABDOMINAL PAIN: 0
SHORTNESS OF BREATH: 0
COUGH: 0

## 2022-11-08 ASSESSMENT — SOCIAL DETERMINANTS OF HEALTH (SDOH): HOW HARD IS IT FOR YOU TO PAY FOR THE VERY BASICS LIKE FOOD, HOUSING, MEDICAL CARE, AND HEATING?: NOT HARD AT ALL

## 2022-11-08 NOTE — PROGRESS NOTES
[facility] PRIMARY CARE  [address]   [address]  [address] [address]  Dept: [phone]  Dept Fax: [phone]    Helio Varela is a 47 y.o. female who presents today for her medical conditions/complaints as noted below. Chief Complaint   Patient presents with    6 Month Follow-Up       HPI:     47 y. o. F presented to office as a regular follow-up visit. She denied any current concerns. Has been in stable health. Chronic comorbidities stable. Requested annual labs. Has upcoming appointment with gastroenterology for chronic heartburn. .  Follows up with Coumadin clinic regularly. No other current complaints. Vital signs stable    Medications reviewed and refilled as appropriate, problem list updated. All concerns discussed in details and all questions answered to patient satisfaction.               Hemoglobin A1C (%)   Date Value   12/06/2021 6.0   03/11/2021 6.4 (H)             ( goal A1C is < 7)   No results found for: LABMICR  LDL Cholesterol (mg/dL)   Date Value   05/04/2021 120   03/11/2021 154 (H)       (goal LDL is <100)   AST (U/L)   Date Value   12/06/2021 17     ALT (U/L)   Date Value   12/06/2021 14     BUN (mg/dL)   Date Value   03/28/2022 13     BP Readings from Last 3 Encounters:   11/08/22 122/78   08/12/22 110/76   05/03/22 108/72          (goal 120/80)    Past Medical History:   Diagnosis Date    CAD (coronary artery disease)     GERD (gastroesophageal reflux disease)     Hyperlipidemia     Hypertension     Prediabetes       Past Surgical History:   Procedure Laterality Date    CARDIAC DEFIBRILLATOR PLACEMENT  05/03/2021    CORONARY ARTERY BYPASS GRAFT  2004    EYE SURGERY  1990    Eye Socket    SHOULDER SURGERY Right 1994    rods and pins placed    TONSILLECTOMY         Family History   Problem Relation Age of Onset    Heart Disease Father     Colon Cancer Brother     Heart Attack Mother        Social History     Tobacco Use Smoking status: Former     Packs/day: 1.00     Years: 25.00     Pack years: 25.00     Types: Cigarettes     Quit date: 01/2007     Years since quitting: 15.8    Smokeless tobacco: Never   Substance Use Topics    Alcohol use: Not Currently      Current Outpatient Medications   Medication Sig Dispense Refill    omeprazole (PRILOSEC) 20 MG delayed release capsule Take 1 capsule by mouth Daily 30 capsule 5    warfarin (COUMADIN) 5 MG tablet TAKE 1/2 (ONE-HALF) TO 1 TABLET BY MOUTH ONCE DAILY AS DIRECTED 60 tablet 3    vitamin D (ERGOCALCIFEROL) 1.25 MG (89266 UT) CAPS capsule Take 1 capsule by mouth once a week 12 capsule 3    metoprolol succinate (TOPROL XL) 25 MG extended release tablet Take 2 tablets by mouth 2 times daily 120 tablet 3    potassium chloride (KLOR-CON M) 20 MEQ TBCR extended release tablet TAKE 1 TABLET BY MOUTH ONCE DAILY 90 tablet 1    empagliflozin (JARDIANCE) 10 MG tablet Take 1 tablet by mouth once daily 90 tablet 3    atorvastatin (LIPITOR) 40 MG tablet Take 1 tablet by mouth daily 90 tablet 3    furosemide (LASIX) 40 MG tablet Take 1 tablet by mouth daily 60 tablet 2    sacubitril-valsartan (ENTRESTO) 24-26 MG per tablet Take 1 tablet by mouth 2 times daily      Handicap Placard MISC by Does not apply route For 5 years 1 each 0     No current facility-administered medications for this visit.      No Known Allergies    Health Maintenance   Topic Date Due    Pneumococcal 0-64 years Vaccine (1 - PCV) Never done    DTaP/Tdap/Td vaccine (1 - Tdap) Never done    Cervical cancer screen  Never done    Shingles vaccine (1 of 2) Never done    COVID-19 Vaccine (4 - Booster for Pfizer series) 02/11/2022    Lipids  05/04/2022    A1C test (Diabetic or Prediabetic)  12/06/2022    Hepatitis C screen  08/12/2023 (Originally 7/3/1989)    HIV screen  08/12/2023 (Originally 7/3/1986)    Depression Screen  08/12/2023    Breast cancer screen  10/05/2023    Colorectal Cancer Screen  10/27/2024    Flu vaccine Completed    Hepatitis A vaccine  Aged Out    Hib vaccine  Aged Out    Meningococcal (ACWY) vaccine  Aged Out       Subjective:      Review of Systems   Constitutional:  Negative for fatigue and fever. HENT:  Negative for congestion. Respiratory:  Negative for cough and shortness of breath. Cardiovascular:  Negative for chest pain and palpitations. Gastrointestinal:  Negative for abdominal pain. Positive for heartburn   Genitourinary:  Negative for flank pain and hematuria. Musculoskeletal:  Negative for arthralgias and myalgias. Skin:  Negative for rash. Allergic/Immunologic: Negative for environmental allergies. Neurological:  Negative for dizziness and light-headedness. Hematological:  Negative for adenopathy. Psychiatric/Behavioral:  The patient is not nervous/anxious. Objective:     Physical Exam  Constitutional:       Appearance: Normal appearance. She is obese. HENT:      Head: Normocephalic and atraumatic. Nose: No congestion. Cardiovascular:      Rate and Rhythm: Normal rate and regular rhythm. Pulses: Normal pulses. Heart sounds: Normal heart sounds. No murmur heard. Pulmonary:      Effort: Pulmonary effort is normal. No respiratory distress. Breath sounds: Normal breath sounds. Abdominal:      General: Bowel sounds are normal.      Palpations: Abdomen is soft. Tenderness: There is no abdominal tenderness. Musculoskeletal:         General: No swelling. Normal range of motion. Cervical back: Normal range of motion and neck supple. Skin:     Findings: No bruising. Neurological:      General: No focal deficit present. Mental Status: She is alert and oriented to person, place, and time. Cranial Nerves: No cranial nerve deficit. Psychiatric:         Mood and Affect: Mood normal.         Behavior: Behavior normal.         Thought Content:  Thought content normal.     /78   Pulse 74   Resp 16   Ht 5' 8\" (1.727 m) Wt 249 lb (112.9 kg)   SpO2 98%   BMI 37.86 kg/m²     Assessment:      Diagnoses and all orders for this visit:  Gastroesophageal reflux disease without esophagitis  -     omeprazole (PRILOSEC) 20 MG delayed release capsule; Take 1 capsule by mouth Daily  Need for influenza vaccination  -     Influenza, FLUCELVAX, (age 10 mo+), IM, Preservative Free, 0.5 mL  Chronic fatigue  -     CBC with Auto Differential; Future  -     Comprehensive Metabolic Panel; Future  -     TSH with Reflex; Future  -     Vitamin B12 & Folate; Future  Impaired glucose metabolism  -     Hemoglobin A1C; Future  Mixed hyperlipidemia  -     Lipid Panel; Future  Vitamin D deficiency  -     Vitamin D 25 Hydroxy; Future  Screening mammogram for breast cancer  -     STACIA DIGITAL SCREEN W OR WO CAD BILATERAL; Future  Essential hypertension stable  Chronic systolic congestive heart failure (HCC) stable on current medication regimen  S/P CABG (coronary artery bypass graft)-stable. Hx of deep venous thrombosis-on Coumadin  Prediabetes -on Jardiance          Plan:      Return in about 6 months (around 5/8/2023) for Reg f/u .     Orders Placed This Encounter   Procedures    STACIA DIGITAL SCREEN W OR WO CAD BILATERAL     Standing Status:   Future     Standing Expiration Date:   1/8/2024     Order Specific Question:   Reason for exam:     Answer:   screenig breast cancer    Influenza, FLUCELVAX, (age 10 mo+), IM, Preservative Free, 0.5 mL    CBC with Auto Differential     Standing Status:   Future     Standing Expiration Date:   11/8/2023    Comprehensive Metabolic Panel     Standing Status:   Future     Standing Expiration Date:   5/8/2024    Hemoglobin A1C     Standing Status:   Future     Standing Expiration Date:   5/8/2024    Lipid Panel     Standing Status:   Future     Standing Expiration Date:   11/8/2023     Order Specific Question:   Is Patient Fasting?/# of Hours     Answer:   8-10 hrs    TSH with Reflex     Standing Status:   Future     Standing Expiration Date:   11/8/2023    Vitamin B12 & Folate     Standing Status:   Future     Standing Expiration Date:   11/8/2023    Vitamin D 25 Hydroxy     Standing Status:   Future     Standing Expiration Date:   5/8/2024     Orders Placed This Encounter   Medications    omeprazole (PRILOSEC) 20 MG delayed release capsule     Sig: Take 1 capsule by mouth Daily     Dispense:  30 capsule     Refill:  5         Patient given educational materials - see patient instructions. Discussed use, benefit, and side effects of prescribedmedications. All patient questions answered. Pt voiced understanding. Reviewed health maintenance. Instructed to continue current medications, diet and exercise. Patient agreed with treatment plan. Follow up as directed. Electronically signed by   Karan Malone MD on 11/8/2022 at 2:29 PM  [facility]. Please note that this chart was generated using voice recognition Dragon dictation software. Although every effort was made to ensure the accuracy of this automatedtranscription, some errors in transcription may have occurred.

## 2022-11-14 ENCOUNTER — HOSPITAL ENCOUNTER (OUTPATIENT)
Dept: PHARMACY | Age: 52
Setting detail: THERAPIES SERIES
Discharge: HOME | End: 2022-11-14
Payer: COMMERCIAL

## 2022-11-14 DIAGNOSIS — Z86.718 HX OF DEEP VENOUS THROMBOSIS: Primary | ICD-10-CM

## 2022-11-14 LAB
INR BLD: 2.4
PROTIME: 28.5 SECONDS

## 2022-11-14 PROCEDURE — 85610 PROTHROMBIN TIME: CPT

## 2022-11-14 PROCEDURE — 99211 OFF/OP EST MAY X REQ PHY/QHP: CPT

## 2022-11-21 ENCOUNTER — OFFICE VISIT (OUTPATIENT)
Dept: GASTROENTEROLOGY | Age: 52
End: 2022-11-21
Payer: COMMERCIAL

## 2022-11-21 VITALS
WEIGHT: 250 LBS | BODY MASS INDEX: 38.01 KG/M2 | DIASTOLIC BLOOD PRESSURE: 87 MMHG | SYSTOLIC BLOOD PRESSURE: 133 MMHG | TEMPERATURE: 97.3 F

## 2022-11-21 DIAGNOSIS — E66.8 MODERATE OBESITY: ICD-10-CM

## 2022-11-21 DIAGNOSIS — K21.9 GASTROESOPHAGEAL REFLUX DISEASE, UNSPECIFIED WHETHER ESOPHAGITIS PRESENT: Primary | ICD-10-CM

## 2022-11-21 DIAGNOSIS — Z79.01 ANTICOAGULATED ON COUMADIN: ICD-10-CM

## 2022-11-21 DIAGNOSIS — Z86.718 HX OF DEEP VENOUS THROMBOSIS: ICD-10-CM

## 2022-11-21 DIAGNOSIS — R73.03 PREDIABETES: ICD-10-CM

## 2022-11-21 DIAGNOSIS — Z80.0 FAMILY HISTORY OF COLON CANCER: ICD-10-CM

## 2022-11-21 PROBLEM — E66.9 MODERATE OBESITY: Status: ACTIVE | Noted: 2022-11-21

## 2022-11-21 PROCEDURE — 3078F DIAST BP <80 MM HG: CPT | Performed by: INTERNAL MEDICINE

## 2022-11-21 PROCEDURE — 99204 OFFICE O/P NEW MOD 45 MIN: CPT | Performed by: INTERNAL MEDICINE

## 2022-11-21 PROCEDURE — G8482 FLU IMMUNIZE ORDER/ADMIN: HCPCS | Performed by: INTERNAL MEDICINE

## 2022-11-21 PROCEDURE — 3017F COLORECTAL CA SCREEN DOC REV: CPT | Performed by: INTERNAL MEDICINE

## 2022-11-21 PROCEDURE — G8417 CALC BMI ABV UP PARAM F/U: HCPCS | Performed by: INTERNAL MEDICINE

## 2022-11-21 PROCEDURE — G8427 DOCREV CUR MEDS BY ELIG CLIN: HCPCS | Performed by: INTERNAL MEDICINE

## 2022-11-21 PROCEDURE — 3074F SYST BP LT 130 MM HG: CPT | Performed by: INTERNAL MEDICINE

## 2022-11-21 PROCEDURE — 1036F TOBACCO NON-USER: CPT | Performed by: INTERNAL MEDICINE

## 2022-11-21 ASSESSMENT — ENCOUNTER SYMPTOMS
ABDOMINAL DISTENTION: 0
SORE THROAT: 0
CHOKING: 0
VOMITING: 0
RECTAL PAIN: 0
COUGH: 0
DIARRHEA: 0
BLOOD IN STOOL: 0
ABDOMINAL PAIN: 0
TROUBLE SWALLOWING: 0
CONSTIPATION: 0
VOICE CHANGE: 0
NAUSEA: 0
WHEEZING: 0
ANAL BLEEDING: 0
SHORTNESS OF BREATH: 0

## 2022-11-21 NOTE — PROGRESS NOTES
GI NEW PATIENT OFFICE VISIT    INTERVAL HISTORY:   Evaristo Burton MD  [address] [address]  \[facility] [place] [facility]\"" 36.    Chief Complaint   Patient presents with    Gastroesophageal Reflux     Pt is here today as a NP for GERD w/o esophagitis. 1. Gastroesophageal reflux disease, unspecified whether esophagitis present    2. Anticoagulated on Coumadin    3. Hx of deep venous thrombosis    4. Prediabetes    5. Family history of colon cancer    6. Moderate obesity      This patient is evaluated my office for the first time  She has history significant for multiple medical issues  She has moderate obesity  Primarily here for evaluation of acid reflux has been on proton pump inhibitor therapy for a while intermittently  No significant dysphagia  Patient has been complaining of some abdominal pains, off and on cramping  Also complains of abdominal bloating and gas  Has off and on nausea without any sig vomiting  Has some alternating constipation and diarrhea  Has no weight loss  Has some anxiety issues    She has significant family history for colon cancer in her aunt and brother  No known family history for endometrial cancer to suggest [place]? She had a Cologuard test done last year was negative      HISTORY OF PRESENT ILLNESS: Ms.Kimberly ADAM Hassan is a 47 y.o. female with a past history remarkable for , referred for evaluation of   Chief Complaint   Patient presents with    Gastroesophageal Reflux     Pt is here today as a NP for GERD w/o esophagitis. .    Past Medical,Family, and Social History reviewed and does contribute to the patient presenting condition. Patient's PMH/PSH,SH,PSYCH Hx, MEDs, ALLERGIES, and ROS were all reviewed and updated in the appropriate sections.     PAST MEDICAL HISTORY:  Past Medical History:   Diagnosis Date    CAD (coronary artery disease)     GERD (gastroesophageal reflux disease)     Hyperlipidemia     Hypertension     Prediabetes        Past Surgical History:   Procedure Laterality Date    CARDIAC DEFIBRILLATOR PLACEMENT  05/03/2021    CORONARY ARTERY BYPASS GRAFT  2004    EYE SURGERY  1990    Eye Socket    SHOULDER SURGERY Right 1994    rods and pins placed    TONSILLECTOMY         CURRENT MEDICATIONS:    Current Outpatient Medications:     omeprazole (PRILOSEC) 20 MG delayed release capsule, Take 1 capsule by mouth Daily, Disp: 30 capsule, Rfl: 5    warfarin (COUMADIN) 5 MG tablet, TAKE 1/2 (ONE-HALF) TO 1 TABLET BY MOUTH ONCE DAILY AS DIRECTED, Disp: 60 tablet, Rfl: 3    vitamin D (ERGOCALCIFEROL) 1.25 MG (36571 UT) CAPS capsule, Take 1 capsule by mouth once a week, Disp: 12 capsule, Rfl: 3    metoprolol succinate (TOPROL XL) 25 MG extended release tablet, Take 2 tablets by mouth 2 times daily, Disp: 120 tablet, Rfl: 3    potassium chloride (KLOR-CON M) 20 MEQ TBCR extended release tablet, TAKE 1 TABLET BY MOUTH ONCE DAILY, Disp: 90 tablet, Rfl: 1    empagliflozin (JARDIANCE) 10 MG tablet, Take 1 tablet by mouth once daily, Disp: 90 tablet, Rfl: 3    atorvastatin (LIPITOR) 40 MG tablet, Take 1 tablet by mouth daily, Disp: 90 tablet, Rfl: 3    furosemide (LASIX) 40 MG tablet, Take 1 tablet by mouth daily, Disp: 60 tablet, Rfl: 2    sacubitril-valsartan (ENTRESTO) 24-26 MG per tablet, Take 1 tablet by mouth 2 times daily, Disp: , Rfl:     Handicap Placard MISC, by Does not apply route For 5 years, Disp: 1 each, Rfl: 0    ALLERGIES:   No Known Allergies    FAMILY HISTORY:       Problem Relation Age of Onset    Heart Disease Father     Colon Cancer Brother     Heart Attack Mother          SOCIAL HISTORY:   Social History     Socioeconomic History    Marital status: Single     Spouse name: Not on file    Number of children: Not on file    Years of education: Not on file    Highest education level: Not on file   Occupational History    Not on file   Tobacco Use    Smoking status: Former     Packs/day: 1.00     Years: 25.00     Pack years: 25.00     Types: Cigarettes     Quit date: 01/2007     Years since quitting: 15.8    Smokeless tobacco: Never   Vaping Use    Vaping Use: Never used   Substance and Sexual Activity    Alcohol use: Not Currently    Drug use: Not Currently    Sexual activity: Not on file   Other Topics Concern    Not on file   Social History Narrative    Not on file     Social Determinants of Health     Financial Resource Strain: Low Risk     Difficulty of Paying Living Expenses: Not hard at all   Food Insecurity: No Food Insecurity    Worried About Running Out of Food in the Last Year: Never true    Ran Out of Food in the Last Year: Never true   Transportation Needs: Not on file   Physical Activity: Not on file   Stress: Not on file   Social Connections: Not on file   Intimate Partner Violence: Not on file   Housing Stability: Not on file         REVIEW OF SYSTEMS:         Review of Systems   Constitutional:  Negative for appetite change, fatigue and unexpected weight change. HENT:  Negative for sore throat, trouble swallowing and voice change. Respiratory:  Negative for cough, choking, shortness of breath and wheezing. Cardiovascular:  Negative for chest pain, palpitations and leg swelling. Gastrointestinal:  Negative for abdominal distention, abdominal pain, anal bleeding, blood in stool, constipation, diarrhea, nausea, rectal pain and vomiting. Neurological:  Negative for dizziness, weakness, light-headedness, numbness and headaches. Hematological:  Does not bruise/bleed easily. Psychiatric/Behavioral:  Negative for confusion and sleep disturbance. The patient is not nervous/anxious. PHYSICAL EXAMINATION: Vital signs reviewed per the nursing documentation. /87   Temp 97.3 °F (36.3 °C)   Wt 250 lb (113.4 kg)   BMI 38.01 kg/m²   Body mass index is 38.01 kg/m². Physical Exam  Nursing note reviewed. Constitutional:       Appearance: She is well-developed.       Comments: Anxious  Obesity    HENT:      Head: Endoscopic procedure was explained to the patient in detail  The prep and NPO were explained  All the Risks, Benefits, and Alternatives were explained  Risk of Bleeding, Perforation and Cardio Respiratory risks were explained  her questions were answered  The procedure has been scheduled with the  in the office  Patient was asked to give us a call for any questions  The patient has verbalized understanding and agreement to this plan. Pt seems to have signs and symptoms consistent with GERD, acid indigestion and heartburns. She was discussed  in detail about some possible life style and dietary modifications. She was stressed about the maintenance  of appropriate weight and effect of obesity contributing to reflux symptoms. Routine exercise was streesed. Avoidance of Caffeine, nicotine and chocolate were explained. Pt was asked to avoid spices grease and fried food. Advices were also given about avoidance of any kind of fast foods, soda pops and high energy drinks. Pt was advised to place two small block under the head end of the bed which may help with night time reflux. Was advised not to eat any thin at least 2-3 hrs before going to bed and walk especially after dinner    Pt has verbalized understanding and agreement to this plan. Pt was advised in detail about some life style and dietary modifications. She was advised about avoidance of caffeine, nicotine and chocolate. Pt was also told to stay away from any kind of fast foods, soda pops. She was also advised to avoid lots of spices, grease and fried food etc.     Instructions were also given about trying to arrange the timing, quality and quantity of food. Instructions were given about using ample amount of fiber including dietary and supplemental fiber either metamucil, bennafiber or citrucell etc.  Pt was advised about drinking ample amount of water without any colors or chemicals. Stress was given about regular exercise.     Pt has verbalized understanding and agreement to these modifications. Thank you for allowing me to participate in the care of Ms. Paola Ramachandran. For any further questions please do not hesitate to contact me. I have reviewed and agree with the ROS entered by the MA/Nurse.          Gamal Kemp MD, [facility]  Board Certified in Gastroenterology and [address] [place] [address] Gastroenterology  Office #: [phone]

## 2022-12-08 DIAGNOSIS — I50.22 CHRONIC SYSTOLIC CONGESTIVE HEART FAILURE (HCC): ICD-10-CM

## 2022-12-08 RX ORDER — METOPROLOL SUCCINATE 25 MG/1
TABLET, EXTENDED RELEASE ORAL
Qty: 120 TABLET | Refills: 5 | Status: SHIPPED | OUTPATIENT
Start: 2022-12-08

## 2022-12-19 ENCOUNTER — HOSPITAL ENCOUNTER (OUTPATIENT)
Dept: PHARMACY | Age: 52
Setting detail: THERAPIES SERIES
Discharge: HOME | End: 2022-12-19
Payer: COMMERCIAL

## 2022-12-19 DIAGNOSIS — Z86.718 HX OF DEEP VENOUS THROMBOSIS: Primary | ICD-10-CM

## 2022-12-19 LAB
INR BLD: 4.1
PROTIME: 49 SECONDS

## 2022-12-19 PROCEDURE — 85610 PROTHROMBIN TIME: CPT

## 2022-12-19 PROCEDURE — 99212 OFFICE O/P EST SF 10 MIN: CPT

## 2022-12-19 NOTE — PROGRESS NOTES
Medication Management Service, Warfarin Management  [facility] Medication Management, [phone]  Visit Date: 12/19/2022   Subjective:   Gifty Simon is a 47 y.o. female who presents to clinic today for anticoagulation monitoring and adjustment. Patient was referred for warfarin management due to  Indication:   DVT. INR goal: of 2.0-3.0. Duration of therapy: indefinite. Patient reports the following:   Adherent with regimen:  Yes  Missed or extra doses: The patient missed dose 12/14, took it 12/15 morning, threw it up, then took 2.5 mg later that day as normal dose. Bleeding or thromboembolic side effects:  None  Significant medication, dietary, alcohol, or tobacco changes: The patient is still not eating as she normally would. Significant recent illness, disease state changes, or hospitalization:   The patient reports food poisoning last Wednesday and did not take any meds that day. The patient then developed diarrhea. She reports her stomach doesn't quite feel right, but she is able to eat. Upcoming surgeries or procedures:   EGD 2/1/23, still being discussed with cardiology. Will also send a message to Dr Carl Hager for recommendations. Assessment and PLAN   PT/INR done in office per protocol. INR today is 4.1, supratherapeutic. Plan:  Instructed the patient to hold 1 doses, then continue with the current warfarin regimen of 2.5 mg Tue, Thur, Sat and 5 mg on all other days. Using warfarin 5 mg tablets. Recheck INR in 2 week(s). Patient verbalized understanding of dosing directions and information discussed. Dosing schedule given to patient. Progress note sent to referring office. Patient acknowledges working in consult agreement with pharmacist as referred by his/her physician.       Electronically signed by Ced Soriano [facility] on 12/19/22 at 11:24 AM EST    For Pharmacy Admin Tracking Only    Intervention Detail: Dose Adjustment: 1, reason: Therapy Optimization  Total # of Interventions Recommended: 1  Total # of Interventions Accepted: 1  Time Spent (min): 20

## 2022-12-19 NOTE — PROGRESS NOTES
Kindly touch base with cardiology for recommendations. She had a complicated cardiac history some not comfortable managing her Coumadin.   Thank you

## 2022-12-28 ENCOUNTER — HOSPITAL ENCOUNTER (OUTPATIENT)
Age: 52
Discharge: HOME | End: 2022-12-28
Payer: COMMERCIAL

## 2022-12-28 DIAGNOSIS — R73.09 IMPAIRED GLUCOSE METABOLISM: ICD-10-CM

## 2022-12-28 DIAGNOSIS — E78.2 MIXED HYPERLIPIDEMIA: ICD-10-CM

## 2022-12-28 DIAGNOSIS — R53.82 CHRONIC FATIGUE: ICD-10-CM

## 2022-12-28 DIAGNOSIS — E55.9 VITAMIN D DEFICIENCY: ICD-10-CM

## 2022-12-28 LAB
ABSOLUTE EOS #: 0.3 K/UL (ref 0–0.4)
ABSOLUTE LYMPH #: 2.1 K/UL (ref 1–4.8)
ABSOLUTE MONO #: 0.6 K/UL (ref 0.1–1.2)
ALBUMIN SERPL-MCNC: 3.8 G/DL (ref 3.5–5.2)
ALBUMIN/GLOBULIN RATIO: 1.3 (ref 1–2.5)
ALP BLD-CCNC: 128 U/L (ref 35–104)
ALT SERPL-CCNC: 22 U/L (ref 5–33)
ANION GAP SERPL CALCULATED.3IONS-SCNC: 11 MMOL/L (ref 9–17)
AST SERPL-CCNC: 21 U/L
BASOPHILS # BLD: 1 % (ref 0–2)
BASOPHILS ABSOLUTE: 0.1 K/UL (ref 0–0.2)
BILIRUB SERPL-MCNC: 0.7 MG/DL (ref 0.3–1.2)
BUN BLDV-MCNC: 14 MG/DL (ref 6–20)
CALCIUM SERPL-MCNC: 8.7 MG/DL (ref 8.6–10.4)
CHLORIDE BLD-SCNC: 104 MMOL/L (ref 98–107)
CO2: 25 MMOL/L (ref 20–31)
CREAT SERPL-MCNC: 0.7 MG/DL (ref 0.5–0.9)
EOSINOPHILS RELATIVE PERCENT: 4 % (ref 1–4)
FOLATE: 9.7 NG/ML
GFR SERPL CREATININE-BSD FRML MDRD: >60 ML/MIN/1.73M2
GLUCOSE BLD-MCNC: 107 MG/DL (ref 70–99)
HCT VFR BLD CALC: 48.3 % (ref 36–46)
HEMOGLOBIN: 16 G/DL (ref 12–16)
LYMPHOCYTES # BLD: 31 % (ref 24–44)
MCH RBC QN AUTO: 30.5 PG (ref 26–34)
MCHC RBC AUTO-ENTMCNC: 33.1 G/DL (ref 31–37)
MCV RBC AUTO: 92.1 FL (ref 80–100)
MONOCYTES # BLD: 9 % (ref 2–11)
PDW BLD-RTO: 13.1 % (ref 12.5–15.4)
PLATELET # BLD: 240 K/UL (ref 140–450)
PMV BLD AUTO: 8.6 FL (ref 6–12)
POTASSIUM SERPL-SCNC: 4.2 MMOL/L (ref 3.7–5.3)
RBC # BLD: 5.25 M/UL (ref 4–5.2)
SEG NEUTROPHILS: 55 % (ref 36–66)
SEGMENTED NEUTROPHILS ABSOLUTE COUNT: 3.7 K/UL (ref 1.8–7.7)
SODIUM BLD-SCNC: 140 MMOL/L (ref 135–144)
TOTAL PROTEIN: 6.7 G/DL (ref 6.4–8.3)
TSH SERPL DL<=0.05 MIU/L-ACNC: 1.07 UIU/ML (ref 0.3–5)
VITAMIN B-12: 469 PG/ML (ref 232–1245)
WBC # BLD: 6.8 K/UL (ref 3.5–11)

## 2022-12-28 PROCEDURE — 80053 COMPREHEN METABOLIC PANEL: CPT

## 2022-12-28 PROCEDURE — 82607 VITAMIN B-12: CPT

## 2022-12-28 PROCEDURE — 82746 ASSAY OF FOLIC ACID SERUM: CPT

## 2022-12-28 PROCEDURE — 84443 ASSAY THYROID STIM HORMONE: CPT

## 2022-12-28 PROCEDURE — 82306 VITAMIN D 25 HYDROXY: CPT

## 2022-12-28 PROCEDURE — 85025 COMPLETE CBC W/AUTO DIFF WBC: CPT

## 2022-12-28 PROCEDURE — 80061 LIPID PANEL: CPT

## 2022-12-28 PROCEDURE — 36415 COLL VENOUS BLD VENIPUNCTURE: CPT

## 2022-12-28 PROCEDURE — 83036 HEMOGLOBIN GLYCOSYLATED A1C: CPT

## 2022-12-30 LAB
ESTIMATED AVERAGE GLUCOSE: 126 MG/DL
HBA1C MFR BLD: 6 % (ref 4–6)

## 2023-01-01 LAB
CHOLESTEROL/HDL RATIO: 6
CHOLESTEROL: 222 MG/DL
HDLC SERPL-MCNC: 37 MG/DL
LDL CHOLESTEROL: 164 MG/DL (ref 0–130)
TRIGL SERPL-MCNC: 107 MG/DL
VITAMIN D 25-HYDROXY: 42.2 NG/ML

## 2023-01-03 ENCOUNTER — HOSPITAL ENCOUNTER (OUTPATIENT)
Dept: PHARMACY | Age: 53
Setting detail: THERAPIES SERIES
Discharge: HOME | End: 2023-01-03
Payer: COMMERCIAL

## 2023-01-03 LAB
INR BLD: 2.9
PROTIME: 34.2 SECONDS

## 2023-01-03 PROCEDURE — 85610 PROTHROMBIN TIME: CPT

## 2023-01-03 PROCEDURE — 99211 OFF/OP EST MAY X REQ PHY/QHP: CPT

## 2023-01-09 ENCOUNTER — TELEPHONE (OUTPATIENT)
Dept: PRIMARY CARE CLINIC | Age: 53
End: 2023-01-09

## 2023-01-09 NOTE — TELEPHONE ENCOUNTER
Dr. Rafia Padilla office called and asked for fax number to fax over a request for upcoming procedure PT has w Dr. Rafia Padilla on Feb 1.  Gave info

## 2023-01-10 ENCOUNTER — HOSPITAL ENCOUNTER (OUTPATIENT)
Age: 53
Setting detail: SPECIMEN
Discharge: HOME | End: 2023-01-10

## 2023-01-10 ENCOUNTER — OFFICE VISIT (OUTPATIENT)
Dept: PRIMARY CARE CLINIC | Age: 53
End: 2023-01-10
Payer: COMMERCIAL

## 2023-01-10 VITALS
BODY MASS INDEX: 37.71 KG/M2 | DIASTOLIC BLOOD PRESSURE: 74 MMHG | WEIGHT: 248 LBS | HEART RATE: 84 BPM | RESPIRATION RATE: 16 BRPM | OXYGEN SATURATION: 99 % | SYSTOLIC BLOOD PRESSURE: 122 MMHG

## 2023-01-10 DIAGNOSIS — K21.9 GASTROESOPHAGEAL REFLUX DISEASE WITHOUT ESOPHAGITIS: ICD-10-CM

## 2023-01-10 DIAGNOSIS — Z01.419 ENCOUNTER FOR ANNUAL ROUTINE GYNECOLOGICAL EXAMINATION: Primary | ICD-10-CM

## 2023-01-10 PROCEDURE — 1036F TOBACCO NON-USER: CPT | Performed by: STUDENT IN AN ORGANIZED HEALTH CARE EDUCATION/TRAINING PROGRAM

## 2023-01-10 PROCEDURE — 3017F COLORECTAL CA SCREEN DOC REV: CPT | Performed by: STUDENT IN AN ORGANIZED HEALTH CARE EDUCATION/TRAINING PROGRAM

## 2023-01-10 PROCEDURE — 3074F SYST BP LT 130 MM HG: CPT | Performed by: STUDENT IN AN ORGANIZED HEALTH CARE EDUCATION/TRAINING PROGRAM

## 2023-01-10 PROCEDURE — G8482 FLU IMMUNIZE ORDER/ADMIN: HCPCS | Performed by: STUDENT IN AN ORGANIZED HEALTH CARE EDUCATION/TRAINING PROGRAM

## 2023-01-10 PROCEDURE — G8417 CALC BMI ABV UP PARAM F/U: HCPCS | Performed by: STUDENT IN AN ORGANIZED HEALTH CARE EDUCATION/TRAINING PROGRAM

## 2023-01-10 PROCEDURE — 99213 OFFICE O/P EST LOW 20 MIN: CPT | Performed by: STUDENT IN AN ORGANIZED HEALTH CARE EDUCATION/TRAINING PROGRAM

## 2023-01-10 PROCEDURE — 3078F DIAST BP <80 MM HG: CPT | Performed by: STUDENT IN AN ORGANIZED HEALTH CARE EDUCATION/TRAINING PROGRAM

## 2023-01-10 PROCEDURE — G8427 DOCREV CUR MEDS BY ELIG CLIN: HCPCS | Performed by: STUDENT IN AN ORGANIZED HEALTH CARE EDUCATION/TRAINING PROGRAM

## 2023-01-10 RX ORDER — OMEPRAZOLE 20 MG/1
20 CAPSULE, DELAYED RELEASE ORAL DAILY
Qty: 30 CAPSULE | Refills: 5 | Status: SHIPPED | OUTPATIENT
Start: 2023-01-10

## 2023-01-10 ASSESSMENT — PATIENT HEALTH QUESTIONNAIRE - PHQ9
SUM OF ALL RESPONSES TO PHQ QUESTIONS 1-9: 0
1. LITTLE INTEREST OR PLEASURE IN DOING THINGS: 0
2. FEELING DOWN, DEPRESSED OR HOPELESS: 0
SUM OF ALL RESPONSES TO PHQ QUESTIONS 1-9: 0
SUM OF ALL RESPONSES TO PHQ9 QUESTIONS 1 & 2: 0

## 2023-01-10 ASSESSMENT — ENCOUNTER SYMPTOMS
SHORTNESS OF BREATH: 0
ABDOMINAL PAIN: 0
COUGH: 0

## 2023-01-10 NOTE — PROGRESS NOTES
[facility] PR[facility]  [address]  [address] [address]  Dept: [phone]  Dept Fax: [phone]    Gifty Simon is a 47 y.o. female who presents today for her medical conditions/complaints as noted below. Chief Complaint   Patient presents with    Gynecologic Exam     Has noticed spotting        HPI:     47 y. o. F presented to office as a routine gynecological exam.    She is postmenopausal.  No abnormal vaginal discharge. No other concerns. Routine Pap smear performed in office. Vital signs stable  Medications reviewed and refilled as appropriate, problem list updated. All concerns discussed in details and all questions answered to patient satisfaction.               Hemoglobin A1C (%)   Date Value   12/28/2022 6.0   12/06/2021 6.0   03/11/2021 6.4 (H)             ( goal A1C is < 7)   No results found for: LABMICR  LDL Cholesterol (mg/dL)   Date Value   12/28/2022 164 (H)   05/04/2021 120   03/11/2021 154 (H)       (goal LDL is <100)   AST (U/L)   Date Value   12/28/2022 21     ALT (U/L)   Date Value   12/28/2022 22     BUN (mg/dL)   Date Value   12/28/2022 14     BP Readings from Last 3 Encounters:   01/10/23 122/74   11/21/22 133/87   11/08/22 122/78          (goal 120/80)    Past Medical History:   Diagnosis Date    CAD (coronary artery disease)     GERD (gastroesophageal reflux disease)     Hyperlipidemia     Hypertension     Prediabetes       Past Surgical History:   Procedure Laterality Date    CARDIAC DEFIBRILLATOR PLACEMENT  05/03/2021    CORONARY ARTERY BYPASS GRAFT  2004    EYE SURGERY  1990    Eye Socket    SHOULDER SURGERY Right 1994    rods and pins placed    TONSILLECTOMY         Family History   Problem Relation Age of Onset    Heart Disease Father     Colon Cancer Brother     Heart Attack Mother        Social History     Tobacco Use    Smoking status: Former     Packs/day: 1.00     Years: 25.00     Pack years: 25.00 Types: Cigarettes     Quit date: 2007     Years since quittin.0    Smokeless tobacco: Never   Substance Use Topics    Alcohol use: Not Currently      Current Outpatient Medications   Medication Sig Dispense Refill    omeprazole (PRILOSEC) 20 MG delayed release capsule Take 1 capsule by mouth Daily 30 capsule 5    metoprolol succinate (TOPROL XL) 25 MG extended release tablet Take 2 tablets by mouth twice daily 120 tablet 5    warfarin (COUMADIN) 5 MG tablet TAKE 1/2 (ONE-HALF) TO 1 TABLET BY MOUTH ONCE DAILY AS DIRECTED 60 tablet 3    vitamin D (ERGOCALCIFEROL) 1.25 MG (23296 UT) CAPS capsule Take 1 capsule by mouth once a week 12 capsule 3    potassium chloride (KLOR-CON M) 20 MEQ TBCR extended release tablet TAKE 1 TABLET BY MOUTH ONCE DAILY 90 tablet 1    empagliflozin (JARDIANCE) 10 MG tablet Take 1 tablet by mouth once daily 90 tablet 3    atorvastatin (LIPITOR) 40 MG tablet Take 1 tablet by mouth daily 90 tablet 3    furosemide (LASIX) 40 MG tablet Take 1 tablet by mouth daily 60 tablet 2    sacubitril-valsartan (ENTRESTO) 24-26 MG per tablet Take 1 tablet by mouth 2 times daily      Handicap Placard MISC by Does not apply route For 5 years 1 each 0     No current facility-administered medications for this visit.      No Known Allergies    Health Maintenance   Topic Date Due    Pneumococcal 0-64 years Vaccine (1 - PCV) Never done    DTaP/Tdap/Td vaccine (1 - Tdap) Never done    Cervical cancer screen  Never done    Shingles vaccine (1 of 2) Never done    COVID-19 Vaccine (4 - Booster for Pfizer series) 2022    Hepatitis C screen  2023 (Originally 7[birth year]3/1989)    HIV screen  2023 (Originally 7/3/1986)    Breast cancer screen  10/05/2023    Depression Screen  2023    A1C test (Diabetic or Prediabetic)  2023    Lipids  2023    Colorectal Cancer Screen  10/27/2024    Flu vaccine  Completed    Hepatitis A vaccine  Aged Out    Hib vaccine  Aged Out    Meningococcal (ACWY) vaccine  Aged Out       Subjective:      Review of Systems   Constitutional:  Negative for fatigue and fever. HENT:  Negative for congestion. Respiratory:  Negative for cough and shortness of breath. Cardiovascular:  Negative for chest pain and palpitations. Gastrointestinal:  Negative for abdominal pain. Genitourinary:  Negative for decreased urine volume, flank pain, hematuria, menstrual problem, vaginal discharge and vaginal pain. Pap smear performed in office. Musculoskeletal:  Negative for arthralgias and myalgias. Skin:  Negative for rash. Allergic/Immunologic: Negative for environmental allergies. Neurological:  Negative for dizziness and light-headedness. Hematological:  Negative for adenopathy. Psychiatric/Behavioral:  The patient is not nervous/anxious. Objective:     Physical Exam  Constitutional:       Appearance: Normal appearance. She is obese. HENT:      Head: Normocephalic and atraumatic. Nose: No congestion. Cardiovascular:      Rate and Rhythm: Normal rate and regular rhythm. Pulses: Normal pulses. Heart sounds: Normal heart sounds. No murmur heard. Pulmonary:      Effort: Pulmonary effort is normal. No respiratory distress. Breath sounds: Normal breath sounds. Abdominal:      General: Bowel sounds are normal.      Palpations: Abdomen is soft. Tenderness: There is no abdominal tenderness. Musculoskeletal:         General: No swelling. Normal range of motion. Cervical back: Normal range of motion and neck supple. Skin:     Findings: No bruising. Neurological:      General: No focal deficit present. Mental Status: She is alert and oriented to person, place, and time. Cranial Nerves: No cranial nerve deficit. Psychiatric:         Mood and Affect: Mood normal.         Behavior: Behavior normal.         Thought Content:  Thought content normal.     /74   Pulse 84   Resp 16   Wt 248 lb (112.5 kg)   SpO2 99%   BMI 37.71 kg/m²     Assessment:      Diagnoses and all orders for this visit:  Encounter for annual routine gynecological examination  -     PAP Smear; Future  Gastroesophageal reflux disease without esophagitis  -     omeprazole (PRILOSEC) 20 MG delayed release capsule; Take 1 capsule by mouth Daily           Plan:      Return in about 6 months (around 7/10/2023). Orders Placed This Encounter   Procedures    PAP Smear     Patient History:    No LMP recorded. (Menstrual status: Other - See Notes). OBGYN Status: Other - See Notes  Past Surgical History:  2021: CARDIAC DEFIBRILLATOR PLACEMENT  2004: CORONARY ARTERY BYPASS GRAFT  1990: EYE SURGERY      Comment:  Eye Socket  1994: SHOULDER SURGERY; Right      Comment:  rods and pins placed  No date: TONSILLECTOMY      Social History    Tobacco Use      Smoking status: Former        Packs/day: 1.00        Years: 25.00        Pack years: 25        Types: Cigarettes        Quit date: 2007        Years since quittin.0      Smokeless tobacco: Never       Standing Status:   Future     Standing Expiration Date:   1/10/2024     Order Specific Question:   Collection Type     Answer: Thin Prep     Order Specific Question:   Prior Abnormal Pap Test     Answer:   No     Order Specific Question:   Screening or Diagnostic     Answer:   Screening     Order Specific Question:   HPV Requested? Answer:   Yes     Order Specific Question:   High Risk Patient     Answer:   N/A     Orders Placed This Encounter   Medications    omeprazole (PRILOSEC) 20 MG delayed release capsule     Sig: Take 1 capsule by mouth Daily     Dispense:  30 capsule     Refill:  5         Patient given educational materials - see patient instructions. Discussed use, benefit, and side effects of prescribedmedications. All patient questions answered. Pt voiced understanding. Reviewed health maintenance. Instructed to continue current medications, diet and exercise.   Patient agreed with treatment plan. Follow up as directed. Electronically signed by   Jer Santana MD on 1/10/2023 at 2:13 PM  [facility]. Please note that this chart was generated using voice recognition Dragon dictation software. Although every effort was made to ensure the accuracy of this automatedtranscription, some errors in transcription may have occurred.

## 2023-01-11 ENCOUNTER — HOSPITAL ENCOUNTER (OUTPATIENT)
Dept: MAMMOGRAPHY | Age: 53
Discharge: HOME | End: 2023-01-13
Payer: COMMERCIAL

## 2023-01-11 ENCOUNTER — TELEPHONE (OUTPATIENT)
Dept: GASTROENTEROLOGY | Age: 53
End: 2023-01-11

## 2023-01-11 DIAGNOSIS — Z12.31 SCREENING MAMMOGRAM FOR BREAST CANCER: ICD-10-CM

## 2023-01-11 PROCEDURE — 77063 BREAST TOMOSYNTHESIS BI: CPT

## 2023-01-11 NOTE — TELEPHONE ENCOUNTER
Called and informed Lizzette Rubio at Dr. Tamie Chou office that clearance for EGD will need to come from Cardiology.

## 2023-01-11 NOTE — TELEPHONE ENCOUNTER
PCP office called and states they do not follow the patients Coumadin it is [facility] that follows that. You will need to get the clearance from them.

## 2023-01-13 LAB
HPV SAMPLE: NORMAL
HPV, GENOTYPE 16: NOT DETECTED
HPV, GENOTYPE 18: NOT DETECTED
HPV, HIGH RISK OTHER: NOT DETECTED
HPV, INTERPRETATION: NORMAL
SPECIMEN DESCRIPTION: NORMAL

## 2023-01-23 ENCOUNTER — HOSPITAL ENCOUNTER (OUTPATIENT)
Dept: PHARMACY | Age: 53
Setting detail: THERAPIES SERIES
Discharge: HOME | End: 2023-01-23
Payer: COMMERCIAL

## 2023-01-23 ENCOUNTER — TELEPHONE (OUTPATIENT)
Dept: PHARMACY | Age: 53
End: 2023-01-23

## 2023-01-23 DIAGNOSIS — Z86.718 HX OF DEEP VENOUS THROMBOSIS: Primary | ICD-10-CM

## 2023-01-23 LAB
INR BLD: 2.8
PROTIME: 33.6 SECONDS

## 2023-01-23 PROCEDURE — 99211 OFF/OP EST MAY X REQ PHY/QHP: CPT

## 2023-01-23 PROCEDURE — 85610 PROTHROMBIN TIME: CPT

## 2023-01-23 RX ORDER — ENOXAPARIN SODIUM 150 MG/ML
INJECTION SUBCUTANEOUS
Qty: 6 EACH | Refills: 0 | Status: SHIPPED | OUTPATIENT
Start: 2023-01-23

## 2023-01-23 NOTE — TELEPHONE ENCOUNTER
Good morning,    I just saw the patient, I have the cardiac clearance that the GI specialist sent and it states to have PCP decide on the warfarin.  We have bridged the patient in the past, but were looking for additional recommendations for this procedure.  The patient informed me today that Dr Serrano left and for this reason we will need to transition the referral to another provider, whoever she establishes with.  Thank you for your help with this.  Of note, her procedure is 2/1, if she needs bridging we need a decision ASAP as she is tentatively scheduled to stop warfarin on Friday.    Thanks

## 2023-01-23 NOTE — TELEPHONE ENCOUNTER
Our office received clearance request for an upcoming EGD on 2/1/23. The fax was sent to both our office and our referring provider's office, Dr Suzette Patel. The patient is on warfarin for a hx of DVT. Also attached to fax was a cardiac clearance with instructs to reach out to the PCP for orders regarding holding warfarin.   Will message Dr Marybeth Worrell to determine

## 2023-01-23 NOTE — PROGRESS NOTES
Dr Johnson Sat back that she has not established with the patient yet, not until May, but recommends bridging as we have in the past.  The patient did specify in today's visit that she only wants to bridge prior to the procedure and then start up the warfarin after the procedure. I will plan accordingly. I am planning for the patient to start Lovenox Saturday evening through Tuesday morning. Then will have patient take a boost dose of warfarin 2/1/23 after the procedure and then continue with the current warfarin dosing and f/u on 2/10/23. The patient's current weight is 112.5 kg, will keep round dosing of 120 mg/0.8 mL per dose.

## 2023-01-23 NOTE — TELEPHONE ENCOUNTER
Writer spoke with Dr. Dayo Pizarro (who will be taking over the patient's care in 05/2023), she will look further in to the patient's history and determine if the patient is cleared or not and when to hold Warfarin.

## 2023-01-23 NOTE — PROGRESS NOTES
Medication Management Service, Warfarin Management  [facility] Medication Management, [phone]  Visit Date: 1/23/2023   Subjective:   Moni Drew is a 47 y.o. female who presents to clinic today for anticoagulation monitoring and adjustment. Patient was referred for warfarin management due to  Indication:   DVT. INR goal: of 2.0-3.0. Duration of therapy: indefinite. Patient reports the following:   Adherent with regimen:  Yes  Missed or extra doses:  None   Bleeding or thromboembolic side effects:  None  Significant medication, dietary, alcohol, or tobacco changes:  None  Significant recent illness, disease state changes, or hospitalization:  None  Upcoming surgeries or procedures:   EGD 2/1/23, holding 5 days? Bridging? Message out to Dr Rayshawn Badillo to get recommendations. Assessment and PLAN   PT/INR done in office per protocol. INR today is 2.8, therapeutic. Potential to develop bridge plan depending on decision of PCP. Routed message to Dr Rayshawn Badillo and will call as well. Plan:  Instructed the patient to continue the current warfarin regimen of 2.5 mg Tues, Thur, Sat, and 5 mg on all other days. Using warfarin 5 mg tablets. Recheck INR in TBD week(s). Patient verbalized understanding of dosing directions and information discussed. Dosing schedule given to patient. Progress note sent to referring office. Patient acknowledges working in consult agreement with pharmacist as referred by his/her physician.       Electronically signed by CAR Stahl [facility] on 1/23/23 at 11:43 AM EST    For Pharmacy Admin Tracking Only    Intervention Detail:   Total # of Interventions Recommended: 0  Total # of Interventions Accepted: 0  Time Spent (min): 15

## 2023-01-23 NOTE — TELEPHONE ENCOUNTER
Writer spoke with the patient and informed her of the provider's orders, the patient states that she the clearance needed to come from the Cardiologist and that she is seeing the Coumadin Clinic today for her Warfarin treatment who is going to clarify who needs to sign the clearance. Writer advised the patient that if the cardiologist is asking for PCP clearance, to contact the office back and an appointment will be scheduled. The patient verbalized understanding of the writer's instructions.

## 2023-01-24 RX ORDER — POTASSIUM CHLORIDE 20 MEQ/1
TABLET, EXTENDED RELEASE ORAL
Qty: 90 TABLET | Refills: 0 | Status: SHIPPED | OUTPATIENT
Start: 2023-01-24

## 2023-01-27 ENCOUNTER — TELEPHONE (OUTPATIENT)
Dept: PRIMARY CARE CLINIC | Age: 53
End: 2023-01-27

## 2023-02-10 ENCOUNTER — HOSPITAL ENCOUNTER (OUTPATIENT)
Dept: PHARMACY | Age: 53
Setting detail: THERAPIES SERIES
Discharge: HOME | End: 2023-02-10
Payer: COMMERCIAL

## 2023-02-10 DIAGNOSIS — Z86.718 HX OF DEEP VENOUS THROMBOSIS: Primary | ICD-10-CM

## 2023-02-10 LAB
INR BLD: 2.2
PROTIME: 26.7 SECONDS

## 2023-02-10 PROCEDURE — 99211 OFF/OP EST MAY X REQ PHY/QHP: CPT

## 2023-02-10 PROCEDURE — 85610 PROTHROMBIN TIME: CPT

## 2023-02-10 RX ORDER — SACUBITRIL AND VALSARTAN 24; 26 MG/1; MG/1
1 TABLET, FILM COATED ORAL 2 TIMES DAILY
Qty: 60 TABLET | Refills: 2 | Status: SHIPPED | OUTPATIENT
Start: 2023-02-10

## 2023-02-10 NOTE — PROGRESS NOTES
Medication Management Service, Warfarin Management  [facility] Medication Management, [phone]  Visit Date: 2/10/2023   Subjective:   Charles Dodson is a 47 y.o. female who presents to clinic today for anticoagulation monitoring and adjustment. Patient was referred for warfarin management due to  Indication:   DVT. INR goal: of 2.0-3.0. Duration of therapy: indefinite. Patient reports the following:   Adherent with regimen:  Yes  Missed or extra doses:  None   Bleeding or thromboembolic side effects:   bruising from Lovenox. Significant medication, dietary, alcohol, or tobacco changes:  None  Significant recent illness, disease state changes, or hospitalization:   was ill, had to cancel the   Upcoming surgeries or procedures:   5/16/23 EGD rescheduled, holding 5 days, bridging prior to procedure, but not after. Assessment and PLAN   PT/INR done in office per protocol. INR today is 2.2, therapeutic. Plan:  Instructed the patient to continue the current warfarin regimen of 2.5 mg Tues, Thur, Sat, and 5 mg on all other days. Using warfarin 5 mg tablets. Recheck INR in 4 week(s). Patient verbalized understanding of dosing directions and information discussed. Dosing schedule given to patient. Progress note sent to referring office. Patient acknowledges working in consult agreement with pharmacist as referred by his/her physician.       Electronically signed by Cisco Donato [facility] on 2/10/23 at 11:40 AM EST    For Pharmacy Admin Tracking Only    Intervention Detail:   Total # of Interventions Recommended: 0  Total # of Interventions Accepted: 0  Time Spent (min): 15

## 2023-03-01 NOTE — TELEPHONE ENCOUNTER
Last Visit Date: 1/10/2023   Next Visit Date: 05/09/2023 NTP with Dr. Wiley Ardon    Patient called stating her old dose was sent to the pharmacy by mistake.

## 2023-03-13 ENCOUNTER — HOSPITAL ENCOUNTER (OUTPATIENT)
Dept: PHARMACY | Age: 53
Setting detail: THERAPIES SERIES
Discharge: HOME | End: 2023-03-13
Payer: COMMERCIAL

## 2023-03-13 DIAGNOSIS — Z86.718 HX OF DEEP VENOUS THROMBOSIS: Primary | ICD-10-CM

## 2023-03-13 LAB
INR BLD: 2.3
PROTIME: 27.9 SECONDS

## 2023-03-13 PROCEDURE — 85610 PROTHROMBIN TIME: CPT

## 2023-03-13 PROCEDURE — 99211 OFF/OP EST MAY X REQ PHY/QHP: CPT

## 2023-03-13 NOTE — PROGRESS NOTES
Medication Management Service, Warfarin Management  [facility] Medication Management, [phone]  Visit Date: 3/13/2023   Subjective:   Ca Drake is a 47 y.o. female who presents to clinic today for anticoagulation monitoring and adjustment. Patient was referred for warfarin management due to  Indication:   DVT. INR goal: of 2.0-3.0. Duration of therapy: indefinite. Patient reports the following:   Adherent with regimen:  Yes  Missed or extra doses:  None   Bleeding or thromboembolic side effects:  None  Significant medication, dietary, alcohol, or tobacco changes:  None  Significant recent illness, disease state changes, or hospitalization:  None  Upcoming surgeries or procedures:   5/16/23 EGD rescheduled, holding 5 days, bridging prior to procedure, but not after. Assessment and PLAN   PT/INR done in office per protocol. INR today is 2.3, therapeutic. Plan:  Instructed the patient to continue the current warfarin regimen of 2.5 mg Tues, Thur, Sat, and 5 mg on all other days. Using warfarin 5 mg tablets. Recheck INR in 5 week(s). Patient verbalized understanding of dosing directions and information discussed. Dosing schedule given to patient. Progress note sent to referring office. Patient acknowledges working in consult agreement with pharmacist as referred by his/her physician.       Electronically signed by CAR Hardy [facility] on 3/13/23 at 11:40 AM EDT    For Pharmacy Admin Tracking Only    Intervention Detail:   Total # of Interventions Recommended: 0  Total # of Interventions Accepted: 0  Time Spent (min): 15

## 2023-04-17 ENCOUNTER — HOSPITAL ENCOUNTER (OUTPATIENT)
Dept: PHARMACY | Age: 53
Setting detail: THERAPIES SERIES
Discharge: HOME | End: 2023-04-17
Payer: COMMERCIAL

## 2023-04-17 DIAGNOSIS — Z86.718 HX OF DEEP VENOUS THROMBOSIS: Primary | ICD-10-CM

## 2023-04-17 LAB
INR BLD: 2.1
PROTIME: 24.9 SECONDS

## 2023-04-17 PROCEDURE — 99211 OFF/OP EST MAY X REQ PHY/QHP: CPT

## 2023-04-17 PROCEDURE — 85610 PROTHROMBIN TIME: CPT

## 2023-04-17 NOTE — PROGRESS NOTES
Medication Management Service, Warfarin Management  [facility] Medication Management, [phone]  Visit Date: 4/17/2023   Subjective:   Geronimo Aguirre is a 47 y.o. female who presents to clinic today for anticoagulation monitoring and adjustment. Patient was referred for warfarin management due to  Indication:   DVT. INR goal: of 2.0-3.0. Duration of therapy: indefinite. Patient reports the following:   Adherent with regimen:  Yes  Missed or extra doses:   Forgot dose 4/15, took dose the next day. Bleeding or thromboembolic side effects:  None  Significant medication, dietary, alcohol, or tobacco changes:  None  Significant recent illness, disease state changes, or hospitalization:  None  Upcoming surgeries or procedures:   EGD on 5/16/23, holding 5 days, patient only wants to bridge prior to procedure and then resume warfarin ASAP after procedure without bridging. Assessment and PLAN   PT/INR done in office per protocol. INR today is 2.1, therapeutic. Need updated referral so we can send in Lovenox to [facility] in [facility]. Patient will need 110 mg Lovenox BID. Routing message to PCP office. Plan:  Instructed the patient to continue the current warfarin regimen of 2.5 mg Tues, Thur, Sat, and 5 mg on all other days. Using warfarin 5 mg tablets. Recheck INR in 3 week(s) due to procedure planning. Patient verbalized understanding of dosing directions and information discussed. Dosing schedule given to patient. Progress note sent to referring office. Patient acknowledges working in consult agreement with pharmacist as referred by his/her physician.       Electronically signed by Ermelinda Quiroga, [address] [place] [address] on 4/17/23 at 11:34 AM EDT    For Pharmacy Admin Tracking Only    Intervention Detail:   Total # of Interventions Recommended: 0  Total # of Interventions Accepted: 0  Time Spent (min): 20

## 2023-05-08 ENCOUNTER — HOSPITAL ENCOUNTER (OUTPATIENT)
Dept: PHARMACY | Age: 53
Setting detail: THERAPIES SERIES
Discharge: HOME | End: 2023-05-08
Payer: COMMERCIAL

## 2023-05-08 DIAGNOSIS — Z86.718 HX OF DEEP VENOUS THROMBOSIS: Primary | ICD-10-CM

## 2023-05-08 LAB
INR BLD: 2.7
PROTIME: 32.2 SECONDS

## 2023-05-08 PROCEDURE — 99211 OFF/OP EST MAY X REQ PHY/QHP: CPT

## 2023-05-08 PROCEDURE — 85610 PROTHROMBIN TIME: CPT

## 2023-05-08 NOTE — PROGRESS NOTES
Medication Management Service, Warfarin Management  [facility] Medication Management, [phone]  Visit Date: 5/8/2023   Subjective:   Alondra Gramajo is a 47 y.o. female who presents to clinic today for anticoagulation monitoring and adjustment. Patient was referred for warfarin management due to  Indication:   DVT. INR goal: of 2.0-3.0. Duration of therapy: indefinite. Patient reports the following:   Adherent with regimen:  Yes  Missed or extra doses:  None   Bleeding or thromboembolic side effects:  None  Significant medication, dietary, alcohol, or tobacco changes:  None  Significant recent illness, disease state changes, or hospitalization:  None  Upcoming surgeries or procedures:   EGD/colonoscopy is rescheduled to August due to provider. Assessment and PLAN   PT/INR done in office per protocol. INR today is 2.7, therapeutic. Plan:  Instructed the patient to continue the current warfarin regimen of 2.5 mg Tues, Thur, Sat, and 5 mg on all other days. Using warfarin 5 mg tablets. Recheck INR in 6 week(s). Patient verbalized understanding of dosing directions and information discussed. Dosing schedule given to patient. Progress note sent to referring office. Patient acknowledges working in consult agreement with pharmacist as referred by his/her physician.       Electronically signed by Pearl Barbosa [facility] on 5/8/23 at 11:16 AM EDT    For Pharmacy Admin Tracking Only    Intervention Detail:   Total # of Interventions Recommended: 0  Total # of Interventions Accepted: 0  Time Spent (min): 15

## 2023-05-09 ENCOUNTER — OFFICE VISIT (OUTPATIENT)
Dept: PRIMARY CARE CLINIC | Age: 53
End: 2023-05-09
Payer: COMMERCIAL

## 2023-05-09 VITALS
BODY MASS INDEX: 36.89 KG/M2 | HEART RATE: 82 BPM | SYSTOLIC BLOOD PRESSURE: 122 MMHG | OXYGEN SATURATION: 98 % | DIASTOLIC BLOOD PRESSURE: 64 MMHG | WEIGHT: 242.6 LBS | RESPIRATION RATE: 16 BRPM

## 2023-05-09 DIAGNOSIS — Z86.718 HX OF DEEP VENOUS THROMBOSIS: ICD-10-CM

## 2023-05-09 DIAGNOSIS — K21.9 GASTROESOPHAGEAL REFLUX DISEASE WITHOUT ESOPHAGITIS: Primary | ICD-10-CM

## 2023-05-09 DIAGNOSIS — R73.03 PREDIABETES: ICD-10-CM

## 2023-05-09 DIAGNOSIS — I10 ESSENTIAL HYPERTENSION: ICD-10-CM

## 2023-05-09 DIAGNOSIS — I50.22 CHRONIC SYSTOLIC CONGESTIVE HEART FAILURE (HCC): ICD-10-CM

## 2023-05-09 PROCEDURE — 99204 OFFICE O/P NEW MOD 45 MIN: CPT | Performed by: FAMILY MEDICINE

## 2023-05-09 PROCEDURE — 3017F COLORECTAL CA SCREEN DOC REV: CPT | Performed by: FAMILY MEDICINE

## 2023-05-09 PROCEDURE — 3074F SYST BP LT 130 MM HG: CPT | Performed by: FAMILY MEDICINE

## 2023-05-09 PROCEDURE — 3078F DIAST BP <80 MM HG: CPT | Performed by: FAMILY MEDICINE

## 2023-05-09 PROCEDURE — 1036F TOBACCO NON-USER: CPT | Performed by: FAMILY MEDICINE

## 2023-05-09 PROCEDURE — G8417 CALC BMI ABV UP PARAM F/U: HCPCS | Performed by: FAMILY MEDICINE

## 2023-05-09 PROCEDURE — G8427 DOCREV CUR MEDS BY ELIG CLIN: HCPCS | Performed by: FAMILY MEDICINE

## 2023-05-09 RX ORDER — METOPROLOL SUCCINATE 25 MG/1
50 TABLET, EXTENDED RELEASE ORAL 2 TIMES DAILY
Qty: 120 TABLET | Refills: 5 | Status: SHIPPED | OUTPATIENT
Start: 2023-05-09

## 2023-05-09 RX ORDER — OMEPRAZOLE 20 MG/1
20 CAPSULE, DELAYED RELEASE ORAL DAILY
Qty: 90 CAPSULE | Refills: 3 | Status: SHIPPED | OUTPATIENT
Start: 2023-05-09

## 2023-05-09 RX ORDER — ATORVASTATIN CALCIUM 40 MG/1
40 TABLET, FILM COATED ORAL DAILY
Qty: 90 TABLET | Refills: 3 | Status: SHIPPED | OUTPATIENT
Start: 2023-05-09

## 2023-05-09 SDOH — ECONOMIC STABILITY: INCOME INSECURITY: HOW HARD IS IT FOR YOU TO PAY FOR THE VERY BASICS LIKE FOOD, HOUSING, MEDICAL CARE, AND HEATING?: NOT HARD AT ALL

## 2023-05-09 SDOH — ECONOMIC STABILITY: FOOD INSECURITY: WITHIN THE PAST 12 MONTHS, YOU WORRIED THAT YOUR FOOD WOULD RUN OUT BEFORE YOU GOT MONEY TO BUY MORE.: NEVER TRUE

## 2023-05-09 SDOH — ECONOMIC STABILITY: FOOD INSECURITY: WITHIN THE PAST 12 MONTHS, THE FOOD YOU BOUGHT JUST DIDN'T LAST AND YOU DIDN'T HAVE MONEY TO GET MORE.: NEVER TRUE

## 2023-05-09 SDOH — ECONOMIC STABILITY: HOUSING INSECURITY
IN THE LAST 12 MONTHS, WAS THERE A TIME WHEN YOU DID NOT HAVE A STEADY PLACE TO SLEEP OR SLEPT IN A SHELTER (INCLUDING NOW)?: NO

## 2023-05-09 ASSESSMENT — ENCOUNTER SYMPTOMS
SHORTNESS OF BREATH: 0
VOMITING: 0

## 2023-05-09 NOTE — PROGRESS NOTES
[address] PRIMARY CARE  [address]   [address]  [address] [address]  Dept: [phone]  Dept Fax: [phone]    Jamey Ash is a 47 y.o. female who presents today for her medical conditions/complaints as noted below. Jamey Ash is c/o of  Chief Complaint   Patient presents with    Establish Care     New to Provider     Referral - General     Coumadin clinic requesting updated referral           HPI:     HPI    Pt here to establish care with new pcp    Hx of dvt- on coumadin. EGD coming up in august- has GERD. She will be doing lovenox bridge. 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      Hemoglobin A1C (%)   Date Value   12/28/2022 6.0   12/06/2021 6.0   03/11/2021 6.4 (H)             ( goal A1C is < 7)   No results found for: LABMICR  LDL Cholesterol (mg/dL)   Date Value   12/28/2022 164 (H)   05/04/2021 120   03/11/2021 154 (H)       (goal LDL is <100)   AST (U/L)   Date Value   12/28/2022 21     ALT (U/L)   Date Value   12/28/2022 22     BUN (mg/dL)   Date Value   12/28/2022 14     BP Readings from Last 3 Encounters:   05/09/23 122/64   01/10/23 122/74   11/21/22 133/87          (goal 120/80)    Past Medical History:   Diagnosis Date    CAD (coronary artery disease)     GERD (gastroesophageal reflux disease)     Hyperlipidemia     Hypertension     Prediabetes       Past Surgical History:   Procedure Laterality Date    CARDIAC DEFIBRILLATOR PLACEMENT  05/03/2021    CORONARY ARTERY BYPASS GRAFT  2004    EYE SURGERY  1990    Eye Socket    SHOULDER SURGERY Right 1994    rods and pins placed    TONSILLECTOMY         Family History   Problem Relation Age of Onset    Heart Disease Father     Colon Cancer Brother     Heart Attack Mother        Social History     Tobacco Use    Smoking status: Former     Packs/day: 1.00     Years:

## 2023-06-19 ENCOUNTER — HOSPITAL ENCOUNTER (OUTPATIENT)
Dept: PHARMACY | Age: 53
Setting detail: THERAPIES SERIES
Discharge: HOME | End: 2023-06-19
Payer: COMMERCIAL

## 2023-06-19 DIAGNOSIS — Z86.718 HX OF DEEP VENOUS THROMBOSIS: Primary | ICD-10-CM

## 2023-06-19 LAB
INR BLD: 2.2
PROTIME: 26.9 SECONDS

## 2023-06-19 PROCEDURE — 85610 PROTHROMBIN TIME: CPT

## 2023-06-19 PROCEDURE — 99211 OFF/OP EST MAY X REQ PHY/QHP: CPT

## 2023-06-19 NOTE — PROGRESS NOTES
Medication Management Service, Warfarin Management  [facility] Medication Management, [phone]  Visit Date: 6/19/2023   Subjective:   Geronimo Aguirre is a 47 y.o. female who presents to clinic today for anticoagulation monitoring and adjustment. Patient was referred for warfarin management due to  Indication:   DVT. INR goal: of 2.0-3.0. Duration of therapy: indefinite. Patient reports the following:   Adherent with regimen:  Yes  Missed or extra doses:   missed Saturday and took it Sunday morning    Bleeding or thromboembolic side effects:   patient was not having periods and just had one recently  Significant medication, dietary, alcohol, or tobacco changes:  None  Significant recent illness, disease state changes, or hospitalization:  None  Upcoming surgeries or procedures:   EGD on 8/16/23, will reach out to Dr Mau Newsome for recommendations. Assessment and PLAN   PT/INR done in office per protocol. INR today is 2.2, therapeutic. Plan:  Instructed the patient to continue the current warfarin regimen of 2.5 mg Tues, Thur, Sat, and 5 mg on all other days. Using warfarin 5 mg tablets. Recheck INR in 6 week(s). Patient verbalized understanding of dosing directions and information discussed. Dosing schedule given to patient. Progress note sent to referring office. Patient acknowledges working in consult agreement with pharmacist as referred by his/her physician.       Electronically signed by Ermelinda Quiroga [facility] on 6/19/23 at 11:21 AM EDT    For Pharmacy Admin Tracking Only    Intervention Detail:   Total # of Interventions Recommended: 0  Total # of Interventions Accepted: 0  Time Spent (min): 15

## 2023-07-17 ENCOUNTER — OFFICE VISIT (OUTPATIENT)
Dept: FAMILY MEDICINE CLINIC | Age: 53
End: 2023-07-17
Payer: COMMERCIAL

## 2023-07-17 VITALS
DIASTOLIC BLOOD PRESSURE: 76 MMHG | HEART RATE: 68 BPM | BODY MASS INDEX: 37.1 KG/M2 | SYSTOLIC BLOOD PRESSURE: 122 MMHG | OXYGEN SATURATION: 97 % | WEIGHT: 244 LBS | RESPIRATION RATE: 16 BRPM | TEMPERATURE: 98.6 F

## 2023-07-17 DIAGNOSIS — J40 BRONCHITIS: Primary | ICD-10-CM

## 2023-07-17 DIAGNOSIS — Z91.89 AT RISK FOR SIDE EFFECT OF MEDICATION: ICD-10-CM

## 2023-07-17 PROCEDURE — 99214 OFFICE O/P EST MOD 30 MIN: CPT | Performed by: NURSE PRACTITIONER

## 2023-07-17 PROCEDURE — 3074F SYST BP LT 130 MM HG: CPT | Performed by: NURSE PRACTITIONER

## 2023-07-17 PROCEDURE — 3017F COLORECTAL CA SCREEN DOC REV: CPT | Performed by: NURSE PRACTITIONER

## 2023-07-17 PROCEDURE — G8427 DOCREV CUR MEDS BY ELIG CLIN: HCPCS | Performed by: NURSE PRACTITIONER

## 2023-07-17 PROCEDURE — G8417 CALC BMI ABV UP PARAM F/U: HCPCS | Performed by: NURSE PRACTITIONER

## 2023-07-17 PROCEDURE — 1036F TOBACCO NON-USER: CPT | Performed by: NURSE PRACTITIONER

## 2023-07-17 PROCEDURE — 3078F DIAST BP <80 MM HG: CPT | Performed by: NURSE PRACTITIONER

## 2023-07-17 RX ORDER — FLUCONAZOLE 150 MG/1
150 TABLET ORAL ONCE
Qty: 1 TABLET | Refills: 0 | Status: SHIPPED | OUTPATIENT
Start: 2023-07-17 | End: 2023-07-17

## 2023-07-17 RX ORDER — BENZONATATE 100 MG/1
100 CAPSULE ORAL 3 TIMES DAILY PRN
Qty: 30 CAPSULE | Refills: 0 | Status: SHIPPED | OUTPATIENT
Start: 2023-07-17 | End: 2023-07-27

## 2023-07-17 RX ORDER — DOXYCYCLINE HYCLATE 100 MG
100 TABLET ORAL 2 TIMES DAILY
Qty: 14 TABLET | Refills: 0 | Status: SHIPPED | OUTPATIENT
Start: 2023-07-17 | End: 2023-07-24

## 2023-07-17 ASSESSMENT — ENCOUNTER SYMPTOMS
WHEEZING: 1
SORE THROAT: 0
NAUSEA: 0
CHEST TIGHTNESS: 0
RHINORRHEA: 0
SHORTNESS OF BREATH: 0
EYE PAIN: 0
COUGH: 1
VOMITING: 0

## 2023-07-31 ENCOUNTER — HOSPITAL ENCOUNTER (OUTPATIENT)
Dept: PHARMACY | Age: 53
Setting detail: THERAPIES SERIES
Discharge: HOME | End: 2023-07-31
Payer: COMMERCIAL

## 2023-07-31 DIAGNOSIS — Z86.718 HX OF DEEP VENOUS THROMBOSIS: Primary | ICD-10-CM

## 2023-07-31 DIAGNOSIS — Z86.718 HX OF DEEP VENOUS THROMBOSIS: ICD-10-CM

## 2023-07-31 LAB
INR BLD: 2.8
PROTIME: 33.6 SECONDS

## 2023-07-31 PROCEDURE — 85610 PROTHROMBIN TIME: CPT

## 2023-07-31 PROCEDURE — 99211 OFF/OP EST MAY X REQ PHY/QHP: CPT

## 2023-07-31 RX ORDER — ENOXAPARIN SODIUM 150 MG/ML
INJECTION SUBCUTANEOUS
Qty: 10 EACH | Refills: 0 | Status: SHIPPED | OUTPATIENT
Start: 2023-07-31

## 2023-07-31 NOTE — PROGRESS NOTES
Medication Management Service, Warfarin Management  [facility] Medication Management, [phone]  Visit Date: 7/31/2023   Subjective:   Shaheen Graves is a 47 y.o. female who presents to clinic today for anticoagulation monitoring and adjustment. Patient was referred for warfarin management due to  Indication:   DVT. INR goal: of 2.0-3.0. Duration of therapy: indefinite. Patient reports the following:   Adherent with regimen:  Yes  Missed or extra doses:  None   Bleeding or thromboembolic side effects:  None  Significant medication, dietary, alcohol, or tobacco changes:  None  Significant recent illness, disease state changes, or hospitalization:  None  Upcoming surgeries or procedures:  None           Assessment and PLAN   PT/INR done in office per protocol. INR today is 2.8, therapeutic. Plan:  Instructed the patient to continue the current warfarin regimen of 2.5 mg Tues, Thur, Sat, and 5 mg on all other days. Using warfarin 5 mg tablets. Patient has upcoming EGD on 8/16/23. Will send prescription to [facility] DR NORM KAYE in [address] [place] [address] for Lovenox 110 mg subcutaneous BID for 5 days prior to procedure. Instructed patient to hold warfarin starting on 8/11/23. Patient would like to only bridge prior to procedure. Instructed patient to start back on Warfarin the evening of 8/16 with 10 mg then 7.5 mg on 8/17, then resume normal regimen. Recheck INR in 3 week(s). Patient verbalized understanding of dosing directions and information discussed. Dosing schedule given to patient. Progress note sent to referring office. Patient acknowledges working in consult agreement with pharmacist as referred by his/her physician.       Electronically signed by Brandi Tello [facility] on 7/31/23 at 11:30 AM EDT    For Pharmacy Admin Tracking Only    Intervention Detail:   Total # of Interventions Recommended: 0  Total # of Interventions Accepted: 0  Time Spent (min): 20

## 2023-08-10 ENCOUNTER — OFFICE VISIT (OUTPATIENT)
Dept: PRIMARY CARE CLINIC | Age: 53
End: 2023-08-10
Payer: COMMERCIAL

## 2023-08-10 VITALS
OXYGEN SATURATION: 97 % | WEIGHT: 240 LBS | DIASTOLIC BLOOD PRESSURE: 72 MMHG | BODY MASS INDEX: 36.49 KG/M2 | HEART RATE: 82 BPM | SYSTOLIC BLOOD PRESSURE: 118 MMHG

## 2023-08-10 DIAGNOSIS — R73.03 PREDIABETES: ICD-10-CM

## 2023-08-10 DIAGNOSIS — E78.2 MIXED HYPERLIPIDEMIA: ICD-10-CM

## 2023-08-10 DIAGNOSIS — Z95.810 S/P INTERNAL CARDIAC DEFIBRILLATOR PROCEDURE: ICD-10-CM

## 2023-08-10 DIAGNOSIS — K21.9 GASTROESOPHAGEAL REFLUX DISEASE WITHOUT ESOPHAGITIS: Primary | ICD-10-CM

## 2023-08-10 DIAGNOSIS — I10 ESSENTIAL HYPERTENSION: ICD-10-CM

## 2023-08-10 DIAGNOSIS — Z95.1 S/P CABG (CORONARY ARTERY BYPASS GRAFT): ICD-10-CM

## 2023-08-10 PROCEDURE — 1036F TOBACCO NON-USER: CPT | Performed by: FAMILY MEDICINE

## 2023-08-10 PROCEDURE — 99214 OFFICE O/P EST MOD 30 MIN: CPT | Performed by: FAMILY MEDICINE

## 2023-08-10 PROCEDURE — 3078F DIAST BP <80 MM HG: CPT | Performed by: FAMILY MEDICINE

## 2023-08-10 PROCEDURE — G8417 CALC BMI ABV UP PARAM F/U: HCPCS | Performed by: FAMILY MEDICINE

## 2023-08-10 PROCEDURE — 3017F COLORECTAL CA SCREEN DOC REV: CPT | Performed by: FAMILY MEDICINE

## 2023-08-10 PROCEDURE — G8427 DOCREV CUR MEDS BY ELIG CLIN: HCPCS | Performed by: FAMILY MEDICINE

## 2023-08-10 PROCEDURE — 3074F SYST BP LT 130 MM HG: CPT | Performed by: FAMILY MEDICINE

## 2023-08-10 RX ORDER — WARFARIN SODIUM 5 MG/1
TABLET ORAL
Qty: 60 TABLET | Refills: 3 | Status: SHIPPED | OUTPATIENT
Start: 2023-08-10

## 2023-08-10 ASSESSMENT — ENCOUNTER SYMPTOMS
SHORTNESS OF BREATH: 0
VOMITING: 0

## 2023-08-10 NOTE — PROGRESS NOTES
[address]  PRIMARY CARE  [address]  [address] [facility] [address]  [place] [address]  Dept: [phone]  Dept Fax: [phone]    Sarah Moody is a 47 y.o. female who presents today for her medical conditions/complaints as noted below. Sarah Moody is c/o of  Chief Complaint   Patient presents with    Gastroesophageal Reflux     Pt does have EGD next week         HPI:     HPI  Patient here for follow-up on chronic conditions. Has EGD coming up next week for her GERD. She will be doing a Lovenox bridge as she is on Coumadin for history of DVT. Has been following with cardiology, history of CABG -has device check coming up. Denies any chest pain    Hypertension well-controlled. Had Bronchitis few weeks ago and and is feeling better. Has minimal cough.           Hemoglobin A1C (%)   Date Value   12/28/2022 6.0   12/06/2021 6.0   03/11/2021 6.4 (H)             ( goal A1C is < 7)   No components found for: LABMICR  LDL Cholesterol (mg/dL)   Date Value   12/28/2022 164 (H)   05/04/2021 120   03/11/2021 154 (H)       (goal LDL is <100)   AST (U/L)   Date Value   12/28/2022 21     ALT (U/L)   Date Value   12/28/2022 22     BUN (mg/dL)   Date Value   12/28/2022 14     BP Readings from Last 3 Encounters:   08/10/23 118/72   07/17/23 122/76   05/09/23 122/64          (goal 120/80)    Past Medical History:   Diagnosis Date    CAD (coronary artery disease)     GERD (gastroesophageal reflux disease)     Hyperlipidemia     Hypertension     Prediabetes       Past Surgical History:   Procedure Laterality Date    CARDIAC DEFIBRILLATOR PLACEMENT  05/03/2021    CORONARY ARTERY BYPASS GRAFT  2004    EYE SURGERY  1990    Eye Socket    SHOULDER SURGERY Right 1994    rods and pins placed    TONSILLECTOMY         Family History   Problem Relation Age of Onset    Heart Disease Father     Colon Cancer Brother     Heart Attack Mother        Social History     Tobacco Use    Smoking

## 2023-08-16 ENCOUNTER — HOSPITAL ENCOUNTER (OUTPATIENT)
Age: 53
Setting detail: OUTPATIENT SURGERY
Discharge: HOME | End: 2023-08-16
Attending: INTERNAL MEDICINE | Admitting: INTERNAL MEDICINE
Payer: COMMERCIAL

## 2023-08-16 ENCOUNTER — ANESTHESIA (OUTPATIENT)
Dept: OPERATING ROOM | Age: 53
End: 2023-08-16
Payer: COMMERCIAL

## 2023-08-16 ENCOUNTER — ANESTHESIA EVENT (OUTPATIENT)
Dept: OPERATING ROOM | Age: 53
End: 2023-08-16
Payer: COMMERCIAL

## 2023-08-16 VITALS
WEIGHT: 242.3 LBS | HEIGHT: 68 IN | TEMPERATURE: 96.8 F | SYSTOLIC BLOOD PRESSURE: 128 MMHG | RESPIRATION RATE: 14 BRPM | DIASTOLIC BLOOD PRESSURE: 74 MMHG | BODY MASS INDEX: 36.72 KG/M2 | HEART RATE: 60 BPM | OXYGEN SATURATION: 96 %

## 2023-08-16 DIAGNOSIS — K21.9 GASTROESOPHAGEAL REFLUX DISEASE, UNSPECIFIED WHETHER ESOPHAGITIS PRESENT: ICD-10-CM

## 2023-08-16 LAB
GLUCOSE BLD-MCNC: 110 MG/DL (ref 65–105)
HCG, PREGNANCY URINE (POC): NEGATIVE

## 2023-08-16 PROCEDURE — 3700000000 HC ANESTHESIA ATTENDED CARE: Performed by: INTERNAL MEDICINE

## 2023-08-16 PROCEDURE — 81025 URINE PREGNANCY TEST: CPT

## 2023-08-16 PROCEDURE — 43239 EGD BIOPSY SINGLE/MULTIPLE: CPT | Performed by: INTERNAL MEDICINE

## 2023-08-16 PROCEDURE — 88305 TISSUE EXAM BY PATHOLOGIST: CPT

## 2023-08-16 PROCEDURE — 7100000011 HC PHASE II RECOVERY - ADDTL 15 MIN: Performed by: INTERNAL MEDICINE

## 2023-08-16 PROCEDURE — 2709999900 HC NON-CHARGEABLE SUPPLY: Performed by: INTERNAL MEDICINE

## 2023-08-16 PROCEDURE — 88342 IMHCHEM/IMCYTCHM 1ST ANTB: CPT

## 2023-08-16 PROCEDURE — 6360000002 HC RX W HCPCS: Performed by: NURSE ANESTHETIST, CERTIFIED REGISTERED

## 2023-08-16 PROCEDURE — 7100000010 HC PHASE II RECOVERY - FIRST 15 MIN: Performed by: INTERNAL MEDICINE

## 2023-08-16 PROCEDURE — 2500000003 HC RX 250 WO HCPCS: Performed by: NURSE ANESTHETIST, CERTIFIED REGISTERED

## 2023-08-16 PROCEDURE — 82947 ASSAY GLUCOSE BLOOD QUANT: CPT

## 2023-08-16 PROCEDURE — 3609012400 HC EGD TRANSORAL BIOPSY SINGLE/MULTIPLE: Performed by: INTERNAL MEDICINE

## 2023-08-16 PROCEDURE — 2580000003 HC RX 258: Performed by: ANESTHESIOLOGY

## 2023-08-16 RX ORDER — LIDOCAINE HYDROCHLORIDE 10 MG/ML
INJECTION, SOLUTION EPIDURAL; INFILTRATION; INTRACAUDAL; PERINEURAL PRN
Status: DISCONTINUED | OUTPATIENT
Start: 2023-08-16 | End: 2023-08-16 | Stop reason: SDUPTHER

## 2023-08-16 RX ORDER — LIDOCAINE HYDROCHLORIDE 10 MG/ML
1 INJECTION, SOLUTION EPIDURAL; INFILTRATION; INTRACAUDAL; PERINEURAL
Status: DISCONTINUED | OUTPATIENT
Start: 2023-08-17 | End: 2023-08-16 | Stop reason: HOSPADM

## 2023-08-16 RX ORDER — PROPOFOL 10 MG/ML
INJECTION, EMULSION INTRAVENOUS PRN
Status: DISCONTINUED | OUTPATIENT
Start: 2023-08-16 | End: 2023-08-16 | Stop reason: SDUPTHER

## 2023-08-16 RX ORDER — SODIUM CHLORIDE, SODIUM LACTATE, POTASSIUM CHLORIDE, CALCIUM CHLORIDE 600; 310; 30; 20 MG/100ML; MG/100ML; MG/100ML; MG/100ML
INJECTION, SOLUTION INTRAVENOUS CONTINUOUS
Status: DISCONTINUED | OUTPATIENT
Start: 2023-08-16 | End: 2023-08-16 | Stop reason: HOSPADM

## 2023-08-16 RX ORDER — SODIUM CHLORIDE 9 MG/ML
INJECTION, SOLUTION INTRAVENOUS CONTINUOUS
Status: DISCONTINUED | OUTPATIENT
Start: 2023-08-16 | End: 2023-08-16 | Stop reason: HOSPADM

## 2023-08-16 RX ADMIN — PROPOFOL 50 MG: 10 INJECTION, EMULSION INTRAVENOUS at 09:43

## 2023-08-16 RX ADMIN — SODIUM CHLORIDE, POTASSIUM CHLORIDE, SODIUM LACTATE AND CALCIUM CHLORIDE: 600; 310; 30; 20 INJECTION, SOLUTION INTRAVENOUS at 08:21

## 2023-08-16 RX ADMIN — PROPOFOL 30 MG: 10 INJECTION, EMULSION INTRAVENOUS at 09:48

## 2023-08-16 RX ADMIN — PROPOFOL 30 MG: 10 INJECTION, EMULSION INTRAVENOUS at 09:47

## 2023-08-16 RX ADMIN — LIDOCAINE HYDROCHLORIDE 50 MG: 10 INJECTION, SOLUTION EPIDURAL; INFILTRATION; INTRACAUDAL; PERINEURAL at 09:43

## 2023-08-16 RX ADMIN — PROPOFOL 40 MG: 10 INJECTION, EMULSION INTRAVENOUS at 09:44

## 2023-08-16 RX ADMIN — PROPOFOL 30 MG: 10 INJECTION, EMULSION INTRAVENOUS at 09:46

## 2023-08-16 ASSESSMENT — PAIN - FUNCTIONAL ASSESSMENT: PAIN_FUNCTIONAL_ASSESSMENT: 0-10

## 2023-08-16 ASSESSMENT — ENCOUNTER SYMPTOMS
VOMITING: 0
SHORTNESS OF BREATH: 0

## 2023-08-16 NOTE — ANESTHESIA POSTPROCEDURE EVALUATION
Department of Anesthesiology  Postprocedure Note    Patient: Tonya Mclean  MRN: [id number]  YOB: 1970  Date of evaluation: 8/16/2023      Procedure Summary     Date: 08/16/23 Room / Location: [address] - INPATIENT    Anesthesia Start: 6774 Anesthesia Stop: 0957    Procedure: EGD BIOPSY (Mouth) Diagnosis:       Gastroesophageal reflux disease, unspecified whether esophagitis present      (Gastroesophageal reflux disease, unspecified whether esophagitis present [K21.9])    Surgeons: Karmen Izaguirre MD Responsible Provider: Julieta Jin DO    Anesthesia Type: MAC ASA Status: 4          Anesthesia Type: No value filed.     Pawan Phase I: Pawan Score: 10    Pawan Phase II:        Anesthesia Post Evaluation    Patient location during evaluation: PACU  Patient participation: complete - patient participated  Level of consciousness: awake and alert  Airway patency: patent  Nausea & Vomiting: no nausea and no vomiting  Complications: no  Cardiovascular status: hemodynamically stable  Respiratory status: acceptable  Hydration status: stable  Pain management: adequate

## 2023-08-16 NOTE — OP NOTE
PROCEDURE NOTE    DATE OF PROCEDURE: 8/16/2023     SURGEON: Andrey Barba MD    ASSISTANT: None    PREOPERATIVE DIAGNOSIS: NAUSEA  ABD PAINS  GERD    POSTOPERATIVE DIAGNOSIS: As described below    OPERATION: Upper GI endoscopy with Biopsy    ANESTHESIA: MAC PER ANESTHESIA     ESTIMATED BLOOD LOSS: Less than 50 ml    COMPLICATIONS: None. SPECIMENS:  Was Obtained:     HISTORY: The patient is a 47y.o. year old female with history of above preop diagnosis. I recommended esophagogastroduodenoscopy with possible biopsy and I explained the risk, benefits, expected outcome, and alternatives to the procedure. Risks included but are not limited to bleeding, infection, respiratory distress, hypotension, and perforation of the esophagus, stomach, or duodenum. Patient understands and is in agreement. PROCEDURE: The patient was given IV conscious sedation. The patient's SPO2 remained above 90% throughout the procedure. The gastroscope was inserted orally and advanced under direct vision through the esophagus, through the stomach, through the pylorus, and into the descending duodenum. Findings:    Retropharyngeal area was grossly normal appearing    Esophagus: abnormal: TERTIARY CONTRACTIONS  ONE CM ECTOPIC MUCOSA AT GEJ WITH NON OBSTRCTING SCHATZKI;S RING WAS NOTED  FOUR QUADRANT BIOPSIES WERE TAKEN     PROMINENT 4-5 CM HIATAL HERNIA WAS NOTED    Stomach:    Fundus: abnormal: FEW SMALL POLYPS    Body: abnormal: THREE 4 MM POLYPS BIOPSIES WERE TAKEN     Antrum: abnormal: MILD GASTRITIS WAS BIOPSIED    Duodenum:     Descending: normal    Bulb: normal    The scope was removed and the patient tolerated the procedure well.      Recommendations/Plan:   F/U Biopsies  F/U In Office in 3-4 weeks  Discussed with the family  Post sedation patient was stable with stable vital signs and stable O2 saturations    Electronically signed by Andrey Barba MD  on 8/16/2023 at 9:51 AM

## 2023-08-18 LAB — SURGICAL PATHOLOGY REPORT: NORMAL

## 2023-08-21 ENCOUNTER — HOSPITAL ENCOUNTER (OUTPATIENT)
Dept: PHARMACY | Age: 53
Setting detail: THERAPIES SERIES
Discharge: HOME | End: 2023-08-21
Payer: COMMERCIAL

## 2023-08-21 DIAGNOSIS — Z86.718 HX OF DEEP VENOUS THROMBOSIS: Primary | ICD-10-CM

## 2023-08-21 LAB
INR BLD: 1.8
PROTIME: 21 SECONDS

## 2023-08-21 PROCEDURE — 99212 OFFICE O/P EST SF 10 MIN: CPT

## 2023-08-21 PROCEDURE — 85610 PROTHROMBIN TIME: CPT

## 2023-08-21 NOTE — PROGRESS NOTES
1  Total # of Interventions Accepted: 1  Time Spent (min): [address], PharmD  [facility]  8/21/2023 11:48 AM

## 2023-08-23 DIAGNOSIS — R73.03 PREDIABETES: ICD-10-CM

## 2023-08-23 DIAGNOSIS — Z86.718 HX OF DEEP VENOUS THROMBOSIS: ICD-10-CM

## 2023-08-23 DIAGNOSIS — Z80.0 FAMILY HISTORY OF COLON CANCER: ICD-10-CM

## 2023-08-23 DIAGNOSIS — Z79.01 ANTICOAGULATED ON COUMADIN: ICD-10-CM

## 2023-08-23 DIAGNOSIS — K21.9 GASTROESOPHAGEAL REFLUX DISEASE, UNSPECIFIED WHETHER ESOPHAGITIS PRESENT: ICD-10-CM

## 2023-08-23 DIAGNOSIS — E66.8 MODERATE OBESITY: ICD-10-CM

## 2023-08-28 ENCOUNTER — HOSPITAL ENCOUNTER (OUTPATIENT)
Dept: PHARMACY | Age: 53
Setting detail: THERAPIES SERIES
Discharge: HOME | End: 2023-08-28
Payer: COMMERCIAL

## 2023-08-28 DIAGNOSIS — Z86.718 HX OF DEEP VENOUS THROMBOSIS: Primary | ICD-10-CM

## 2023-08-28 LAB
INR BLD: 2.2
PROTIME: 26.9 SECONDS

## 2023-08-28 PROCEDURE — 99211 OFF/OP EST MAY X REQ PHY/QHP: CPT

## 2023-08-28 PROCEDURE — 85610 PROTHROMBIN TIME: CPT

## 2023-08-28 NOTE — PROGRESS NOTES
Medication Management Service, Warfarin Management  [facility] Medication Management, [phone]  Visit Date: 8/28/2023   Subjective:   Jessica Young is a 47 y.o. female who presents to clinic today for anticoagulation monitoring and adjustment. Patient was referred for warfarin management due to  Indication:   DVT. INR goal: of 2.0-3.0. Duration of therapy: indefinite. Patient reports the following:   Adherent with regimen:  Yes  Missed or extra doses:   patient reports missing one dose in the last week but she took it the next morning and did not skip any total doses - estimates this dose was missed 3-4 days ago and taken the following day    Bleeding or thromboembolic side effects:  None  Significant medication, dietary, alcohol, or tobacco changes:  None  Significant recent illness, disease state changes, or hospitalization:  None  Upcoming surgeries or procedures:  None           Assessment and PLAN   PT/INR done in office per protocol. INR today is 2.2, therapeutic. Plan:  Instructed the patient to continue the current warfarin regimen of 2.5 mg Tues, Thur, Sat, and 5 mg on all other days. Using warfarin 5 mg tablets. Recheck INR in 4 week(s). Patient verbalized understanding of dosing directions and information discussed. Dosing schedule given to patient. Progress note sent to referring office. Patient acknowledges working in consult agreement with pharmacist as referred by his/her physician.       Electronically signed by Ines Hector [facility] on 8/28/23 at 9:57 AM EDT    For Pharmacy Admin Tracking Only    Intervention Detail:   Total # of Interventions Recommended: 0  Total # of Interventions Accepted: 0  Time Spent (min): 20

## 2023-09-06 ENCOUNTER — TELEPHONE (OUTPATIENT)
Dept: GASTROENTEROLOGY | Age: 53
End: 2023-09-06

## 2023-09-11 RX ORDER — POTASSIUM CHLORIDE 20 MEQ/1
20 TABLET, EXTENDED RELEASE ORAL DAILY
Qty: 90 TABLET | Refills: 0 | Status: SHIPPED | OUTPATIENT
Start: 2023-09-11

## 2023-09-12 RX ORDER — POTASSIUM CHLORIDE 20 MEQ/1
TABLET, EXTENDED RELEASE ORAL
Qty: 90 TABLET | Refills: 0 | OUTPATIENT
Start: 2023-09-12

## 2023-09-25 ENCOUNTER — HOSPITAL ENCOUNTER (OUTPATIENT)
Dept: PHARMACY | Age: 53
Setting detail: THERAPIES SERIES
Discharge: HOME | End: 2023-09-25
Payer: COMMERCIAL

## 2023-09-25 DIAGNOSIS — Z86.718 HX OF DEEP VENOUS THROMBOSIS: Primary | ICD-10-CM

## 2023-09-25 LAB
INR BLD: 2.2
PROTIME: 26.3 SECONDS

## 2023-09-25 PROCEDURE — 85610 PROTHROMBIN TIME: CPT

## 2023-09-25 PROCEDURE — 99211 OFF/OP EST MAY X REQ PHY/QHP: CPT

## 2023-09-25 NOTE — PROGRESS NOTES
Medication Management Service, Warfarin Management  [facility] Medication Management, [phone]  Visit Date: 9/25/2023   Subjective:   Won Edwards is a 47 y.o. female who presents to clinic today for anticoagulation monitoring and adjustment. Patient was referred for warfarin management due to  Indication:   DVT. INR goal: of 2.0-3.0. Duration of therapy: indefinite. Patient reports the following:   Adherent with regimen:  Yes  Missed or extra doses:   missed one dose 09/18 but took it the following day, total weekly dose the same    Bleeding or thromboembolic side effects:  None  Significant medication, dietary, alcohol, or tobacco changes:  None  Significant recent illness, disease state changes, or hospitalization:  None  Upcoming surgeries or procedures:  None           Assessment and PLAN   PT/INR done in office per protocol. INR today is 2.2, therapeutic. Plan:  Instructed the patient to continue the current warfarin regimen of 2.5 mg Tues, Thur, Sat, and 5 mg on all other days. Using warfarin 5 mg tablets. Recheck INR in 4 week(s). Patient verbalized understanding of dosing directions and information discussed. Dosing schedule given to patient. Progress note sent to referring office. Patient acknowledges working in consult agreement with pharmacist as referred by his/her physician.       Electronically signed by Boaz Slaughter [address] [place] [address] on 9/25/23 at 8:57 AM EDT    For Pharmacy Admin Tracking Only    Intervention Detail:   Total # of Interventions Recommended: 0  Total # of Interventions Accepted: 0  Time Spent (min): 20

## 2023-11-03 ENCOUNTER — HOSPITAL ENCOUNTER (OUTPATIENT)
Dept: PHARMACY | Age: 53
Setting detail: THERAPIES SERIES
Discharge: HOME | End: 2023-11-03
Payer: COMMERCIAL

## 2023-11-03 DIAGNOSIS — Z86.718 HX OF DEEP VENOUS THROMBOSIS: Primary | ICD-10-CM

## 2023-11-03 LAB
INR BLD: 2.4
LV EF: 24 %
PROTIME: 29.3 SECONDS

## 2023-11-03 PROCEDURE — 85610 PROTHROMBIN TIME: CPT

## 2023-11-03 PROCEDURE — 99211 OFF/OP EST MAY X REQ PHY/QHP: CPT

## 2023-11-03 NOTE — PROGRESS NOTES
Medication Management Service, Warfarin Management  [facility] Medication Management, [phone]  Visit Date: 11/3/2023   Subjective:   Torie Drew is a 47 y.o. female who presents to clinic today for anticoagulation monitoring and adjustment. Patient was referred for warfarin management due to  Indication:   DVT. INR goal: of 2.0-3.0. Duration of therapy: indefinite. Patient reports the following:   Adherent with regimen:  Yes  Missed or extra doses:  None  Bleeding or thromboembolic side effects:  None  Significant medication, dietary, alcohol, or tobacco changes:  None  Significant recent illness, disease state changes, or hospitalization:  None  Upcoming surgeries or procedures:  None           Assessment and PLAN   PT/INR done in office per protocol. INR today is 2.4, therapeutic. Plan:  Instructed the patient to continue the current warfarin regimen of 2.5 mg Tues, Thur, Sat, and 5 mg on all other days. Using warfarin 5 mg tablets. Recheck INR in 5 week(s). Patient verbalized understanding of dosing directions and information discussed. Dosing schedule given to patient. Progress note sent to referring office. Patient acknowledges working in consult agreement with pharmacist as referred by his/her physician.       Electronically signed by CAR Boggs [facility] on 11/3/23 at 8:57 AM EDT    For Pharmacy Admin Tracking Only    Intervention Detail:   Total # of Interventions Recommended: 0  Total # of Interventions Accepted: 0  Time Spent (min): 20

## 2023-11-08 DIAGNOSIS — I50.22 CHRONIC SYSTOLIC CONGESTIVE HEART FAILURE (HCC): ICD-10-CM

## 2023-11-08 RX ORDER — METOPROLOL SUCCINATE 25 MG/1
50 TABLET, EXTENDED RELEASE ORAL 2 TIMES DAILY
Qty: 120 TABLET | Refills: 0 | Status: SHIPPED | OUTPATIENT
Start: 2023-11-08

## 2023-12-06 RX ORDER — POTASSIUM CHLORIDE 1500 MG/1
TABLET, EXTENDED RELEASE ORAL
Qty: 90 TABLET | Refills: 1 | Status: SHIPPED | OUTPATIENT
Start: 2023-12-06

## 2023-12-07 DIAGNOSIS — I50.22 CHRONIC SYSTOLIC CONGESTIVE HEART FAILURE (HCC): ICD-10-CM

## 2023-12-07 RX ORDER — METOPROLOL SUCCINATE 25 MG/1
50 TABLET, EXTENDED RELEASE ORAL 2 TIMES DAILY
Qty: 120 TABLET | Refills: 3 | Status: SHIPPED | OUTPATIENT
Start: 2023-12-07

## 2023-12-08 DIAGNOSIS — I50.22 CHRONIC SYSTOLIC CONGESTIVE HEART FAILURE (HCC): ICD-10-CM

## 2023-12-08 RX ORDER — FUROSEMIDE 40 MG/1
40 TABLET ORAL DAILY
Qty: 90 TABLET | Refills: 3 | Status: SHIPPED | OUTPATIENT
Start: 2023-12-08

## 2023-12-11 ENCOUNTER — HOSPITAL ENCOUNTER (OUTPATIENT)
Dept: PHARMACY | Age: 53
Setting detail: THERAPIES SERIES
Discharge: HOME | End: 2023-12-11
Payer: COMMERCIAL

## 2023-12-11 DIAGNOSIS — Z86.718 HX OF DEEP VENOUS THROMBOSIS: Primary | ICD-10-CM

## 2023-12-11 LAB
INR BLD: 2.8
PROTIME: 34.2 SECONDS

## 2023-12-11 PROCEDURE — 99211 OFF/OP EST MAY X REQ PHY/QHP: CPT

## 2023-12-11 PROCEDURE — 85610 PROTHROMBIN TIME: CPT

## 2023-12-11 NOTE — PROGRESS NOTES
Medication Management Service, Warfarin Management  [facility] Medication Management, [phone]  Visit Date: 12/11/2023   Subjective:   Carol Shafer is a 47 y.o. female who presents to clinic today for anticoagulation monitoring and adjustment. Patient was referred for warfarin management due to  Indication:   DVT. INR goal: of 2.0-3.0. Duration of therapy: indefinite. Patient reports the following:   Adherent with regimen:  Yes  Missed or extra doses:  None   Bleeding or thromboembolic side effects:  None  Significant medication, dietary, alcohol, or tobacco changes:  None  Significant recent illness, disease state changes, or hospitalization:  None  Upcoming surgeries or procedures:  None           Assessment and PLAN   PT/INR done in office per protocol. INR today is 2.8, therapeutic. Plan:  Instructed the patient to continue the current warfarin regimen of 2.5 mg Tues, Thur, Sat, and 5 mg on all other days. Using warfarin 5 mg tablets. Recheck INR in 5 week(s). Patient verbalized understanding of dosing directions and information discussed. Dosing schedule given to patient. Progress note sent to referring office. Patient acknowledges working in consult agreement with pharmacist as referred by his/her physician.       Electronically signed by Evens Boykin [facility] on 12/11/23 at 11:13 AM EST    For Pharmacy Admin Tracking Only    Intervention Detail:   Total # of Interventions Recommended: 0  Total # of Interventions Accepted: 0  Time Spent (min): 20

## 2024-01-10 ENCOUNTER — OFFICE VISIT (OUTPATIENT)
Dept: PRIMARY CARE CLINIC | Age: 54
End: 2024-01-10
Payer: COMMERCIAL

## 2024-01-10 VITALS
SYSTOLIC BLOOD PRESSURE: 118 MMHG | BODY MASS INDEX: 37.13 KG/M2 | HEART RATE: 70 BPM | DIASTOLIC BLOOD PRESSURE: 82 MMHG | WEIGHT: 245 LBS | HEIGHT: 68 IN | OXYGEN SATURATION: 97 %

## 2024-01-10 DIAGNOSIS — R73.03 PREDIABETES: ICD-10-CM

## 2024-01-10 DIAGNOSIS — I10 ESSENTIAL HYPERTENSION: ICD-10-CM

## 2024-01-10 DIAGNOSIS — E55.9 VITAMIN D DEFICIENCY: ICD-10-CM

## 2024-01-10 DIAGNOSIS — Z23 NEED FOR PNEUMOCOCCAL VACCINE: ICD-10-CM

## 2024-01-10 DIAGNOSIS — I42.0 DILATED CARDIOMYOPATHY (HCC): ICD-10-CM

## 2024-01-10 DIAGNOSIS — Z12.31 ENCOUNTER FOR SCREENING MAMMOGRAM FOR MALIGNANT NEOPLASM OF BREAST: ICD-10-CM

## 2024-01-10 DIAGNOSIS — Z00.00 HEALTHCARE MAINTENANCE: Primary | ICD-10-CM

## 2024-01-10 DIAGNOSIS — Z23 NEED FOR INFLUENZA VACCINATION: ICD-10-CM

## 2024-01-10 PROCEDURE — 90472 IMMUNIZATION ADMIN EACH ADD: CPT | Performed by: FAMILY MEDICINE

## 2024-01-10 PROCEDURE — 90674 CCIIV4 VAC NO PRSV 0.5 ML IM: CPT | Performed by: FAMILY MEDICINE

## 2024-01-10 PROCEDURE — 3074F SYST BP LT 130 MM HG: CPT | Performed by: FAMILY MEDICINE

## 2024-01-10 PROCEDURE — 99396 PREV VISIT EST AGE 40-64: CPT | Performed by: FAMILY MEDICINE

## 2024-01-10 PROCEDURE — 3079F DIAST BP 80-89 MM HG: CPT | Performed by: FAMILY MEDICINE

## 2024-01-10 PROCEDURE — 90471 IMMUNIZATION ADMIN: CPT | Performed by: FAMILY MEDICINE

## 2024-01-10 PROCEDURE — 90677 PCV20 VACCINE IM: CPT | Performed by: FAMILY MEDICINE

## 2024-01-10 PROCEDURE — G8482 FLU IMMUNIZE ORDER/ADMIN: HCPCS | Performed by: FAMILY MEDICINE

## 2024-01-10 SDOH — ECONOMIC STABILITY: FOOD INSECURITY: WITHIN THE PAST 12 MONTHS, YOU WORRIED THAT YOUR FOOD WOULD RUN OUT BEFORE YOU GOT MONEY TO BUY MORE.: NEVER TRUE

## 2024-01-10 SDOH — ECONOMIC STABILITY: FOOD INSECURITY: WITHIN THE PAST 12 MONTHS, THE FOOD YOU BOUGHT JUST DIDN'T LAST AND YOU DIDN'T HAVE MONEY TO GET MORE.: NEVER TRUE

## 2024-01-10 SDOH — ECONOMIC STABILITY: INCOME INSECURITY: HOW HARD IS IT FOR YOU TO PAY FOR THE VERY BASICS LIKE FOOD, HOUSING, MEDICAL CARE, AND HEATING?: NOT HARD AT ALL

## 2024-01-10 ASSESSMENT — PATIENT HEALTH QUESTIONNAIRE - PHQ9
SUM OF ALL RESPONSES TO PHQ QUESTIONS 1-9: 0
SUM OF ALL RESPONSES TO PHQ QUESTIONS 1-9: 0
2. FEELING DOWN, DEPRESSED OR HOPELESS: 0
1. LITTLE INTEREST OR PLEASURE IN DOING THINGS: 0
SUM OF ALL RESPONSES TO PHQ QUESTIONS 1-9: 0
SUM OF ALL RESPONSES TO PHQ9 QUESTIONS 1 & 2: 0
SUM OF ALL RESPONSES TO PHQ QUESTIONS 1-9: 0

## 2024-01-10 ASSESSMENT — ENCOUNTER SYMPTOMS
VOMITING: 0
SHORTNESS OF BREATH: 0

## 2024-01-10 NOTE — PROGRESS NOTES
[facility]  [facility] PRIMARY [facility]ARE  [address] [place] [address] [address]  [address]  [facility] [address]  Dept: [phone]  Dept Fax: [phone]    Cheyanne Atkinson is a 53 y.o. female who presents today for her medical conditions/complaints as noted below.  Cheyanne Atkinson is c/o of  Chief Complaint   Patient presents with    Annual Exam         HPI:     HPI    Pt here for yearly physical.    On coumadin for hx of DVT. Following with coumadin clinic.    Follows with cardiology, hx of CABG and has aicd.  Doing well overall.     HTN controlled.             Hemoglobin A1C (%)   Date Value   12/28/2022 6.0   12/06/2021 6.0   03/11/2021 6.4 (H)             ( goal A1C is < 7)   No components found for: \"LABMICR\"  LDL Cholesterol (mg/dL)   Date Value   12/28/2022 164 (H)   05/04/2021 120   03/11/2021 154 (H)       (goal LDL is <100)   AST (U/L)   Date Value   12/28/2022 21     ALT (U/L)   Date Value   12/28/2022 22     BUN (mg/dL)   Date Value   12/28/2022 14     BP Readings from Last 3 Encounters:   01/10/24 118/82   09/21/23 122/82   08/16/23 128/74          (goal 120/80)    Past Medical History:   Diagnosis Date    CAD (coronary artery disease)     CHF (congestive heart failure) (HCC)     Diabetes mellitus (HCC)     GERD (gastroesophageal reflux disease)     Hx of blood clots     Bilateral legs    Hyperlipidemia     Hypertension     Prediabetes       Past Surgical History:   Procedure Laterality Date    CARDIAC DEFIBRILLATOR PLACEMENT  05/03/2021    CORONARY ARTERY BYPASS GRAFT  2004    ENDOSCOPY, COLON, DIAGNOSTIC      EYE SURGERY  1990    Eye Socket    FRACTURE SURGERY      Mina and pins in R. collar bone    PACEMAKER PLACEMENT      SHOULDER SURGERY Right 1994    rods and pins placed    TONSILLECTOMY      UPPER GASTROINTESTINAL ENDOSCOPY N/A 8/16/2023    EGD BIOPSY performed by Zulma Mcdonough MD at [facility] OR       Family History   Problem Relation Age of Onset    Heart Disease Father     Colon

## 2024-01-15 ENCOUNTER — HOSPITAL ENCOUNTER (OUTPATIENT)
Dept: PHARMACY | Age: 54
Setting detail: THERAPIES SERIES
Discharge: HOME | End: 2024-01-15
Payer: COMMERCIAL

## 2024-01-15 ENCOUNTER — HOSPITAL ENCOUNTER (OUTPATIENT)
Age: 54
Discharge: HOME | End: 2024-01-15
Payer: COMMERCIAL

## 2024-01-15 DIAGNOSIS — R73.03 PREDIABETES: ICD-10-CM

## 2024-01-15 DIAGNOSIS — Z86.718 HX OF DEEP VENOUS THROMBOSIS: Primary | ICD-10-CM

## 2024-01-15 DIAGNOSIS — E55.9 VITAMIN D DEFICIENCY: ICD-10-CM

## 2024-01-15 DIAGNOSIS — Z00.00 HEALTHCARE MAINTENANCE: ICD-10-CM

## 2024-01-15 LAB
25(OH)D3 SERPL-MCNC: 40.4 NG/ML (ref 30–100)
ALBUMIN SERPL-MCNC: 4.2 G/DL (ref 3.5–5.2)
ALBUMIN/GLOB SERPL: 1.2 {RATIO} (ref 1–2.5)
ALP SERPL-CCNC: 141 U/L (ref 35–104)
ALT SERPL-CCNC: 19 U/L (ref 5–33)
ANION GAP SERPL CALCULATED.3IONS-SCNC: 11 MMOL/L (ref 9–17)
AST SERPL-CCNC: 17 U/L
BASOPHILS # BLD: 0.1 K/UL (ref 0–0.2)
BASOPHILS NFR BLD: 1 % (ref 0–2)
BILIRUB SERPL-MCNC: 0.7 MG/DL (ref 0.3–1.2)
BUN SERPL-MCNC: 15 MG/DL (ref 6–20)
CALCIUM SERPL-MCNC: 9.7 MG/DL (ref 8.6–10.4)
CHLORIDE SERPL-SCNC: 102 MMOL/L (ref 98–107)
CHOLEST SERPL-MCNC: 231 MG/DL (ref 0–199)
CHOLESTEROL/HDL RATIO: 6
CO2 SERPL-SCNC: 27 MMOL/L (ref 20–31)
CREAT SERPL-MCNC: 0.7 MG/DL (ref 0.5–0.9)
EOSINOPHIL # BLD: 0.5 K/UL (ref 0–0.4)
EOSINOPHILS RELATIVE PERCENT: 6 % (ref 1–4)
ERYTHROCYTE [DISTWIDTH] IN BLOOD BY AUTOMATED COUNT: 13.5 % (ref 12.5–15.4)
EST. AVERAGE GLUCOSE BLD GHB EST-MCNC: 126 MG/DL
GFR SERPL CREATININE-BSD FRML MDRD: >60 ML/MIN/1.73M2
GLUCOSE SERPL-MCNC: 117 MG/DL (ref 70–99)
HBA1C MFR BLD: 6 % (ref 4–6)
HCT VFR BLD AUTO: 47.1 % (ref 36–46)
HDLC SERPL-MCNC: 38 MG/DL
HGB BLD-MCNC: 16.1 G/DL (ref 12–16)
INR BLD: 2.8
LDLC SERPL CALC-MCNC: 170 MG/DL (ref 0–100)
LYMPHOCYTES NFR BLD: 2.1 K/UL (ref 1–4.8)
LYMPHOCYTES RELATIVE PERCENT: 25 % (ref 24–44)
MCH RBC QN AUTO: 31 PG (ref 26–34)
MCHC RBC AUTO-ENTMCNC: 34.2 G/DL (ref 31–37)
MCV RBC AUTO: 90.8 FL (ref 80–100)
MONOCYTES NFR BLD: 0.6 K/UL (ref 0.1–1.2)
MONOCYTES NFR BLD: 8 % (ref 2–11)
NEUTROPHILS NFR BLD: 60 % (ref 36–66)
NEUTS SEG NFR BLD: 4.9 K/UL (ref 1.8–7.7)
PLATELET # BLD AUTO: 268 K/UL (ref 140–450)
PMV BLD AUTO: 8.2 FL (ref 6–12)
POTASSIUM SERPL-SCNC: 4.4 MMOL/L (ref 3.7–5.3)
PROT SERPL-MCNC: 7.8 G/DL (ref 6.4–8.3)
PROTIME: 33.2 SECONDS
RBC # BLD AUTO: 5.19 M/UL (ref 4–5.2)
SODIUM SERPL-SCNC: 140 MMOL/L (ref 135–144)
TRIGL SERPL-MCNC: 117 MG/DL
VLDLC SERPL CALC-MCNC: 23 MG/DL
WBC OTHER # BLD: 8.2 K/UL (ref 3.5–11)

## 2024-01-15 PROCEDURE — 85025 COMPLETE CBC W/AUTO DIFF WBC: CPT

## 2024-01-15 PROCEDURE — 99211 OFF/OP EST MAY X REQ PHY/QHP: CPT

## 2024-01-15 PROCEDURE — 36415 COLL VENOUS BLD VENIPUNCTURE: CPT

## 2024-01-15 PROCEDURE — 85610 PROTHROMBIN TIME: CPT

## 2024-01-15 PROCEDURE — 83036 HEMOGLOBIN GLYCOSYLATED A1C: CPT

## 2024-01-15 PROCEDURE — 80061 LIPID PANEL: CPT

## 2024-01-15 PROCEDURE — 82306 VITAMIN D 25 HYDROXY: CPT

## 2024-01-15 PROCEDURE — 80053 COMPREHEN METABOLIC PANEL: CPT

## 2024-01-15 NOTE — PROGRESS NOTES
Medication Management Service, Warfarin Management  [facility] Medication Management, [phone]  Visit Date: 1/15/2024   Subjective:   Cheyanne Atkinson is a 53 y.o. female who presents to clinic today for anticoagulation monitoring and adjustment.    Patient was referred for warfarin management due to  Indication:   DVT.   INR goal: of 2.0-3.0.  Duration of therapy: indefinite.    Patient reports the following:   Adherent with regimen:  Yes  Missed or extra doses:  None   Bleeding or thromboembolic side effects:  None  Significant medication, dietary, alcohol, or tobacco changes:  None  Significant recent illness, disease state changes, or hospitalization:  None  Upcoming surgeries or procedures:  None           Assessment and PLAN   PT/INR done in office per protocol.     INR today is 2.8, therapeutic.    Plan:  Instructed the patient to continue the current warfarin regimen of 2.5 mg Tues, Thur, Sat, and 5 mg on all other days.  Using warfarin 5 mg tablets.    Recheck INR in 6 week(s).     Patient verbalized understanding of dosing directions and information discussed. Dosing schedule given to patient. Progress note sent to referring office.  Patient acknowledges working in consult agreement with pharmacist as referred by his/her physician.      Electronically signed by Sailaja Bonilla RPH on 1/15/24 at 9:05 AM EST    For Pharmacy Admin Tracking Only    Intervention Detail:   Total # of Interventions Recommended: 0  Total # of Interventions Accepted: 0  Time Spent (min): 20

## 2024-01-22 DIAGNOSIS — E78.2 MIXED HYPERLIPIDEMIA: Primary | ICD-10-CM

## 2024-01-22 RX ORDER — ROSUVASTATIN CALCIUM 20 MG/1
20 TABLET, COATED ORAL DAILY
Qty: 90 TABLET | Refills: 1 | Status: SHIPPED | OUTPATIENT
Start: 2024-01-22

## 2024-01-31 ENCOUNTER — HOSPITAL ENCOUNTER (OUTPATIENT)
Dept: MAMMOGRAPHY | Age: 54
Discharge: HOME | End: 2024-02-02
Payer: COMMERCIAL

## 2024-01-31 VITALS — BODY MASS INDEX: 36.37 KG/M2 | WEIGHT: 240 LBS | HEIGHT: 68 IN

## 2024-01-31 DIAGNOSIS — Z12.31 ENCOUNTER FOR SCREENING MAMMOGRAM FOR MALIGNANT NEOPLASM OF BREAST: ICD-10-CM

## 2024-01-31 PROCEDURE — 77063 BREAST TOMOSYNTHESIS BI: CPT

## 2024-02-26 ENCOUNTER — HOSPITAL ENCOUNTER (OUTPATIENT)
Dept: PHARMACY | Age: 54
Setting detail: THERAPIES SERIES
Discharge: HOME | End: 2024-02-26
Payer: COMMERCIAL

## 2024-02-26 DIAGNOSIS — Z86.718 HX OF DEEP VENOUS THROMBOSIS: Primary | ICD-10-CM

## 2024-02-26 LAB
INR BLD: 3.2
PROTIME: 38.7 SECONDS

## 2024-02-26 PROCEDURE — 99211 OFF/OP EST MAY X REQ PHY/QHP: CPT

## 2024-02-26 PROCEDURE — 85610 PROTHROMBIN TIME: CPT

## 2024-02-26 NOTE — PROGRESS NOTES
Medication Management Service, Warfarin Management  [facility] Medication Management, [phone]  Visit Date: 2024   Subjective:   Cheyanne Atkinson is a 53 y.o. female who presents to clinic today for anticoagulation monitoring and adjustment.    Patient was referred for warfarin management due to  Indication:   DVT.   INR goal: of 2.0-3.0.  Duration of therapy: indefinite.    Patient reports the following:   Adherent with regimen:  Yes  Missed or extra doses:  None   Bleeding or thromboembolic side effects:  None  Significant medication, dietary, alcohol, or tobacco changes:  None  Significant recent illness, disease state changes, or hospitalization:  None  Upcoming surgeries or procedures:  None           Assessment and PLAN   PT/INR done in office per protocol.     INR today is 3.2, supratherapeutic.    Plan:  Instructed the patient to take a decreased dose of 2.5 mg today only, then continue with the current warfarin regimen of 2.5 mg Tues, Thur, Sat, and 5 mg on all other days.  Using warfarin 5 mg tablets.    Recheck INR in 3 week(s).     Patient verbalized understanding of dosing directions and information discussed. Dosing schedule given to patient. Progress note sent to referring office.  Patient acknowledges working in consult agreement with pharmacist as referred by his/her physician.      Electronically signed by Sailaja Bonilla RPH on 24 at 9:44 AM EST    For Pharmacy Admin Tracking Only    Intervention Detail: Adherence Monitorin and Dose Adjustment: 1, reason: Therapy Optimization  Total # of Interventions Recommended: 2  Total # of Interventions Accepted: 2  Time Spent (min): 20

## 2024-02-28 NOTE — PROGRESS NOTES
Medication Management Service, Warfarin Management  [facility], [phone]  Visit Date: 1/3/2023   Subjective:   Slime Rosa is a 47 y.o. female who presents to clinic today for anticoagulation monitoring and adjustment. Patient seen in clinic for warfarin management due to  Indication:   DVT. INR goal: of 2.0-3.0. Duration of therapy: indefinite. Assessment and PLAN   PT/INR done in office per protocol. INR today is 2.9, therapeutic. Plan: Will continue current regimen of warfarin 2.5 mg on Tuesday, Thursday, Saturday and 5 mg all other days of the week. Using warfarin 5 mg tablets. Pt has a procedure scheduled on 02/01/23. Dosing instructions and possible bridging to be discussed with cardiology. Recheck INR in 3 weeks. Patient seen [facility]. Patient verbalized understanding of dosing directions and information discussed. Dosing schedule given to patient. Progress note sent to referring office. Patient acknowledges working in consult agreement with pharmacist as referred by his/her physician.       Electronically signed by CAR Crawford [facility] on 1/3/23 at 11:56 AM EST     For Pharmacy Admin Tracking Only    No interventions made  Time Spent (min): 10
4 (moderate pain)

## 2024-03-14 ENCOUNTER — OFFICE VISIT (OUTPATIENT)
Dept: FAMILY MEDICINE CLINIC | Age: 54
End: 2024-03-14
Payer: COMMERCIAL

## 2024-03-14 VITALS
DIASTOLIC BLOOD PRESSURE: 68 MMHG | TEMPERATURE: 97.4 F | SYSTOLIC BLOOD PRESSURE: 120 MMHG | HEART RATE: 53 BPM | OXYGEN SATURATION: 95 %

## 2024-03-14 DIAGNOSIS — J01.90 ACUTE SINUSITIS, RECURRENCE NOT SPECIFIED, UNSPECIFIED LOCATION: Primary | ICD-10-CM

## 2024-03-14 PROCEDURE — 3074F SYST BP LT 130 MM HG: CPT | Performed by: PHYSICIAN ASSISTANT

## 2024-03-14 PROCEDURE — 3017F COLORECTAL CA SCREEN DOC REV: CPT | Performed by: PHYSICIAN ASSISTANT

## 2024-03-14 PROCEDURE — G8427 DOCREV CUR MEDS BY ELIG CLIN: HCPCS | Performed by: PHYSICIAN ASSISTANT

## 2024-03-14 PROCEDURE — 1036F TOBACCO NON-USER: CPT | Performed by: PHYSICIAN ASSISTANT

## 2024-03-14 PROCEDURE — 3078F DIAST BP <80 MM HG: CPT | Performed by: PHYSICIAN ASSISTANT

## 2024-03-14 PROCEDURE — G8482 FLU IMMUNIZE ORDER/ADMIN: HCPCS | Performed by: PHYSICIAN ASSISTANT

## 2024-03-14 PROCEDURE — 99213 OFFICE O/P EST LOW 20 MIN: CPT | Performed by: PHYSICIAN ASSISTANT

## 2024-03-14 PROCEDURE — G8417 CALC BMI ABV UP PARAM F/U: HCPCS | Performed by: PHYSICIAN ASSISTANT

## 2024-03-14 RX ORDER — PREDNISONE 20 MG/1
20 TABLET ORAL 2 TIMES DAILY
Qty: 10 TABLET | Refills: 0 | Status: SHIPPED | OUTPATIENT
Start: 2024-03-14 | End: 2024-03-19

## 2024-03-14 RX ORDER — BENZONATATE 200 MG/1
200 CAPSULE ORAL 3 TIMES DAILY PRN
Qty: 21 CAPSULE | Refills: 0 | Status: SHIPPED | OUTPATIENT
Start: 2024-03-14 | End: 2024-03-21

## 2024-03-14 RX ORDER — LORATADINE 10 MG/1
10 TABLET ORAL DAILY
Qty: 30 TABLET | Refills: 0 | Status: SHIPPED | OUTPATIENT
Start: 2024-03-14

## 2024-03-14 RX ORDER — FLUTICASONE PROPIONATE 50 MCG
1 SPRAY, SUSPENSION (ML) NASAL DAILY
Qty: 16 G | Refills: 0 | Status: SHIPPED | OUTPATIENT
Start: 2024-03-14

## 2024-03-14 RX ORDER — AMOXICILLIN AND CLAVULANATE POTASSIUM 875; 125 MG/1; MG/1
1 TABLET, FILM COATED ORAL 2 TIMES DAILY
Qty: 20 TABLET | Refills: 0 | Status: SHIPPED | OUTPATIENT
Start: 2024-03-14 | End: 2024-03-24

## 2024-03-14 ASSESSMENT — ENCOUNTER SYMPTOMS
SINUS PRESSURE: 1
RHINORRHEA: 1
VOMITING: 0
SORE THROAT: 1
EYES NEGATIVE: 1
COUGH: 1
DIARRHEA: 1
NAUSEA: 0
HOARSE VOICE: 1

## 2024-03-14 NOTE — PROGRESS NOTES
orders of the defined types were placed in this encounter.        Plan:  Based on the duration and severity of the symptoms-- I will treat this as bacterial at this time.  Patient instructed to complete antibiotic prescription fully.  May use Motrin/Tylenol for fever/pain.  Saline washes, salt water gargles and over the counter preparations if desired.  Patient agreeable to treatment plan.  Educational materials provided on AVS.  Follow up if symptoms do not improve/worsen.    Patient instructed to return to the office if symptoms worsen, return, or have any other concerns.Patient understands and is agreeable.         Aaliyah Angela PA-C 3/14/2024 5:20 PM

## 2024-03-18 ENCOUNTER — HOSPITAL ENCOUNTER (OUTPATIENT)
Dept: PHARMACY | Age: 54
Setting detail: THERAPIES SERIES
Discharge: HOME | End: 2024-03-18
Payer: COMMERCIAL

## 2024-03-18 DIAGNOSIS — Z86.718 HX OF DEEP VENOUS THROMBOSIS: Primary | ICD-10-CM

## 2024-03-18 LAB
INR BLD: 4.8
PROTIME: 57.3 SECONDS

## 2024-03-18 PROCEDURE — 99213 OFFICE O/P EST LOW 20 MIN: CPT

## 2024-03-18 PROCEDURE — 85610 PROTHROMBIN TIME: CPT

## 2024-03-25 NOTE — PROGRESS NOTES
Medication Management Service, Warfarin Management  [facility] Medication Management, [phone]  Visit Date: 3/26/2024   Subjective:   Cheyanne Atkinson is a 53 y.o. female who presents to clinic today for anticoagulation monitoring and adjustment.    Patient was referred for warfarin management due to  Indication:   DVT.   INR goal: of 2.0-3.0.  Duration of therapy: indefinite.    Patient seen 3/18, INR 4.8, thought due to Augmentin and prednisone. Held two doses, then advised decreased dose until next INR check    Patient reports the following:   Adherent with regimen:  Yes  Missed or extra doses:   only as directed for elevated INR    Bleeding or thromboembolic side effects:  None  Significant medication, dietary, alcohol, or tobacco changes:   has completed Augmentin and prednisone, is feeling better, not other changes in medications and no over the counter medications taken  Significant recent illness, disease state changes, or hospitalization:   recent sinusitis  Upcoming surgeries or procedures:  None           Assessment and PLAN   PT/INR done in office per protocol.     INR today is 2.7, therapeutic.    Plan:  Resume maintenance dose of 2.5 mg on , , and , and 5 mg all other days.  Using warfarin 5 mg tablets.    Recheck INR in 4 week(s).     Patient verbalized understanding of dosing directions and information discussed. Dosing schedule given to patient. Progress note sent to referring office.  Patient acknowledges working in consult agreement with pharmacist as referred by his/her physician.      Electronically signed by Sailaja Bonilla RPH on 3/25/24 at 10:54 AM EDT    For Pharmacy Admin Tracking Only    Intervention Detail: Adherence Monitorin  Total # of Interventions Recommended: 1  Total # of Interventions Accepted: 1  Time Spent (min): 20

## 2024-03-26 ENCOUNTER — HOSPITAL ENCOUNTER (OUTPATIENT)
Dept: PHARMACY | Age: 54
Setting detail: THERAPIES SERIES
Discharge: HOME | End: 2024-03-26
Payer: COMMERCIAL

## 2024-03-26 DIAGNOSIS — Z86.718 HX OF DEEP VENOUS THROMBOSIS: Primary | ICD-10-CM

## 2024-03-26 LAB
INR BLD: 2.7
PROTIME: 32.6 SECONDS

## 2024-03-26 PROCEDURE — 99211 OFF/OP EST MAY X REQ PHY/QHP: CPT

## 2024-03-26 PROCEDURE — 85610 PROTHROMBIN TIME: CPT

## 2024-03-28 DIAGNOSIS — I50.22 CHRONIC SYSTOLIC CONGESTIVE HEART FAILURE (HCC): ICD-10-CM

## 2024-03-28 RX ORDER — METOPROLOL SUCCINATE 25 MG/1
50 TABLET, EXTENDED RELEASE ORAL 2 TIMES DAILY
Qty: 120 TABLET | Refills: 3 | Status: SHIPPED | OUTPATIENT
Start: 2024-03-28

## 2024-04-14 DIAGNOSIS — Z86.718 HX OF DEEP VENOUS THROMBOSIS: Primary | ICD-10-CM

## 2024-04-26 ENCOUNTER — HOSPITAL ENCOUNTER (OUTPATIENT)
Dept: PHARMACY | Age: 54
Setting detail: THERAPIES SERIES
Discharge: HOME | End: 2024-04-26
Payer: COMMERCIAL

## 2024-04-26 DIAGNOSIS — Z86.718 HX OF DEEP VENOUS THROMBOSIS: Primary | ICD-10-CM

## 2024-04-26 LAB
INR BLD: 3.9
PROTIME: 46.8 SECONDS

## 2024-04-26 PROCEDURE — 85610 PROTHROMBIN TIME: CPT

## 2024-04-26 PROCEDURE — 99213 OFFICE O/P EST LOW 20 MIN: CPT

## 2024-04-26 NOTE — PROGRESS NOTES
Medication Management Service, Warfarin Management  [facility] Medication Management, [phone]  Visit Date: 2024   Subjective:   Cheyanne Atkinson is a 53 y.o. female who presents to clinic today for anticoagulation monitoring and adjustment.    Patient was referred for warfarin management due to  Indication:   DVT.   INR goal: of 2.0-3.0.  Duration of therapy: indefinite.    I saw patient 3/26, INR 2.7  Patient likes 6 weeks between appointments but was out of range 3/18 likely due to antibiotics    Patient reports the following:   Adherent with regimen:  Yes  Missed or extra doses:  None   Bleeding or thromboembolic side effects:  None    Significant medication, dietary, alcohol, or tobacco changes:   a lot of stress with helping with her mom's medical issues, has been eating a lot of fast food and likely less greens than usual    Significant recent illness, disease state changes, or hospitalization:  None  Upcoming surgeries or procedures:  None           Assessment and PLAN   PT/INR done in office per protocol.     INR today is 3.9, supratherapeutic.    Plan:  Hold today's dose, then resume maintenance regimen of 2.5 mg every e, Thu, Sat; 5 mg all other days  Using warfarin 5 mg tablets.    Recheck INR in 2 week(s).     Patient verbalized understanding of dosing directions and information discussed. Dosing schedule given to patient. Progress note sent to referring office.  Patient acknowledges working in consult agreement with pharmacist as referred by his/her physician.      Electronically signed by Sailaja Bonilla RPH on 24 at 7:56 AM EDT    For Pharmacy Admin Tracking Only    Intervention Detail: Adherence Monitorin and Dose Adjustment: 1, reason: Therapy Optimization  Total # of Interventions Recommended: 2  Total # of Interventions Accepted: 2  Time Spent (min): 20

## 2024-05-10 ENCOUNTER — HOSPITAL ENCOUNTER (OUTPATIENT)
Dept: PHARMACY | Age: 54
Setting detail: THERAPIES SERIES
Discharge: HOME | End: 2024-05-10
Payer: COMMERCIAL

## 2024-05-10 DIAGNOSIS — Z86.718 HX OF DEEP VENOUS THROMBOSIS: Primary | ICD-10-CM

## 2024-05-10 LAB
INR BLD: 3.2
PROTIME: 38.6 SECONDS

## 2024-05-10 PROCEDURE — 99213 OFFICE O/P EST LOW 20 MIN: CPT

## 2024-05-10 PROCEDURE — 85610 PROTHROMBIN TIME: CPT

## 2024-05-10 NOTE — PROGRESS NOTES
Medication Management Service, Warfarin Management  [facility] Medication Management, [phone]  Visit Date: 5/10/2024   Subjective:   Cheyanne Atkinson is a 53 y.o. female who presents to clinic today for anticoagulation monitoring and adjustment.    Patient was referred for warfarin management due to  Indication:   DVT.   INR goal: of 2.0-3.0.  Duration of therapy: indefinite.    Patient seen , INR 3.9, held one dose and then resumed maintenance dose. Patient had been under a lot of stress with her mother's medical issues and had been eating fewer greens than usual    Patient reports the following:   Adherent with regimen:  Yes  Missed or extra doses:  None   Bleeding or thromboembolic side effects:  None    Significant medication, dietary, alcohol, or tobacco changes:   still some stress with her mother's medical issues, not back to baseline green vegetables , expects this may continue for a while while her mother's health hopefully improves    Significant recent illness, disease state changes, or hospitalization:  None  Upcoming surgeries or procedures:  None           Assessment and PLAN   PT/INR done in office per protocol.     INR today is 3.2, supratherapeutic. Second supratherapeutic reading.    Plan:  Instructed the patient to start a decreased warfarin regimen of 5 mg Mon, Wed, Fri, and 2.5 mg on all other days.  Using warfarin 5 mg tablets.    Recheck INR in 3 week(s).     Patient verbalized understanding of dosing directions and information discussed. Dosing schedule given to patient. Progress note sent to referring office.  Patient acknowledges working in consult agreement with pharmacist as referred by his/her physician.      Electronically signed by Sailaja Bonilla RPH on 5/10/24 at 8:57 AM EDT    For Pharmacy Admin Tracking Only    Intervention Detail: Adherence Monitorin and Dose Adjustment: 1, reason: Therapy Optimization  Total # of Interventions Recommended:

## 2024-05-15 DIAGNOSIS — E55.9 VITAMIN D DEFICIENCY: ICD-10-CM

## 2024-05-15 RX ORDER — ERGOCALCIFEROL 1.25 MG/1
50000 CAPSULE ORAL WEEKLY
Qty: 12 CAPSULE | Refills: 0 | Status: SHIPPED | OUTPATIENT
Start: 2024-05-15

## 2024-05-23 DIAGNOSIS — K21.9 GASTROESOPHAGEAL REFLUX DISEASE WITHOUT ESOPHAGITIS: ICD-10-CM

## 2024-05-23 DIAGNOSIS — E55.9 VITAMIN D DEFICIENCY: ICD-10-CM

## 2024-05-23 DIAGNOSIS — I50.22 CHRONIC SYSTOLIC CONGESTIVE HEART FAILURE (HCC): ICD-10-CM

## 2024-05-23 RX ORDER — ERGOCALCIFEROL 1.25 MG/1
50000 CAPSULE ORAL WEEKLY
Qty: 12 CAPSULE | Refills: 0 | Status: SHIPPED | OUTPATIENT
Start: 2024-05-23

## 2024-05-23 RX ORDER — OMEPRAZOLE 20 MG/1
20 CAPSULE, DELAYED RELEASE ORAL DAILY
Qty: 90 CAPSULE | Refills: 0 | Status: SHIPPED | OUTPATIENT
Start: 2024-05-23

## 2024-06-01 DIAGNOSIS — I50.22 CHRONIC SYSTOLIC CONGESTIVE HEART FAILURE (HCC): ICD-10-CM

## 2024-06-01 DIAGNOSIS — E78.2 MIXED HYPERLIPIDEMIA: ICD-10-CM

## 2024-06-01 RX ORDER — ROSUVASTATIN CALCIUM 20 MG/1
20 TABLET, COATED ORAL DAILY
Qty: 90 TABLET | Refills: 2 | Status: SHIPPED | OUTPATIENT
Start: 2024-06-01

## 2024-06-01 RX ORDER — POTASSIUM CHLORIDE 1500 MG/1
20 TABLET, EXTENDED RELEASE ORAL DAILY
Qty: 90 TABLET | Refills: 2 | Status: SHIPPED | OUTPATIENT
Start: 2024-06-01

## 2024-06-01 RX ORDER — WARFARIN SODIUM 5 MG/1
TABLET ORAL
Qty: 60 TABLET | Refills: 2 | Status: SHIPPED | OUTPATIENT
Start: 2024-06-01

## 2024-06-03 ENCOUNTER — HOSPITAL ENCOUNTER (OUTPATIENT)
Dept: PHARMACY | Age: 54
Setting detail: THERAPIES SERIES
Discharge: HOME | End: 2024-06-03
Payer: COMMERCIAL

## 2024-06-03 DIAGNOSIS — Z86.718 HX OF DEEP VENOUS THROMBOSIS: Primary | ICD-10-CM

## 2024-06-03 LAB
INR BLD: 2.6
PROTIME: 30.9 SECONDS

## 2024-06-03 PROCEDURE — 85610 PROTHROMBIN TIME: CPT

## 2024-06-03 PROCEDURE — 99211 OFF/OP EST MAY X REQ PHY/QHP: CPT

## 2024-06-10 ENCOUNTER — OFFICE VISIT (OUTPATIENT)
Dept: FAMILY MEDICINE CLINIC | Age: 54
End: 2024-06-10

## 2024-06-10 VITALS
RESPIRATION RATE: 14 BRPM | DIASTOLIC BLOOD PRESSURE: 74 MMHG | SYSTOLIC BLOOD PRESSURE: 122 MMHG | HEART RATE: 82 BPM | TEMPERATURE: 98.7 F | OXYGEN SATURATION: 97 %

## 2024-06-10 DIAGNOSIS — S21.002A WOUND OF LEFT BREAST, INITIAL ENCOUNTER: Primary | ICD-10-CM

## 2024-06-10 DIAGNOSIS — N61.0 CELLULITIS OF BREAST: ICD-10-CM

## 2024-06-10 RX ORDER — CEPHALEXIN 500 MG/1
500 CAPSULE ORAL 3 TIMES DAILY
Qty: 30 CAPSULE | Refills: 0 | Status: SHIPPED | OUTPATIENT
Start: 2024-06-10 | End: 2024-06-20

## 2024-06-10 ASSESSMENT — ENCOUNTER SYMPTOMS
COLOR CHANGE: 1
NAUSEA: 0
VOMITING: 0

## 2024-06-10 NOTE — PROGRESS NOTES
[facility]YSICIANS [facility] WALK-IN  [address]  [address]  [facility] [address]  Dept: [phone]  Dept Fax: [phone]    Cheyanne Atkinson is a 53 y.o. female who presents today for her medical conditions/complaints of   Chief Complaint   Patient presents with    Skin Problem     Left breast X 2 weeks           HPI:     /74   Pulse 82   Temp 98.7 °F (37.1 °C)   Resp 14   SpO2 97%       HPI  Pt presented to the walk in today with c/o wound to her left breast.  Pt states 2 weeks ago she noticed a blister on the left breast.  She did not pop it but eventually it did pop.  She has noticed increased redness and warmth to the breast.  There is no fever or drainage.  Minimal pain.    See images uploaded into media file.        Past Medical History:   Diagnosis Date    CAD (coronary artery disease)     CHF (congestive heart failure) (HCC)     Diabetes mellitus (HCC)     GERD (gastroesophageal reflux disease)     Hx of blood clots     Bilateral legs    Hyperlipidemia     Hypertension     Prediabetes         Past Surgical History:   Procedure Laterality Date    CARDIAC DEFIBRILLATOR PLACEMENT  05/03/2021    CORONARY ARTERY BYPASS GRAFT  2004    ENDOSCOPY, COLON, DIAGNOSTIC      EYE SURGERY  1990    Eye Socket    FRACTURE SURGERY      Mina and pins in R. collar bone    PACEMAKER PLACEMENT      SHOULDER SURGERY Right 1994    rods and pins placed    TONSILLECTOMY      UPPER GASTROINTESTINAL ENDOSCOPY N/A 8/16/2023    EGD BIOPSY performed by Zulma Mcdonough MD at [facility] OR       Family History   Problem Relation Age of Onset    Heart Attack Mother     Heart Disease Father     Colon Cancer Brother     Breast Cancer Neg Hx        Social History     Tobacco Use    Smoking status: Former     Current packs/day: 0.00     Average packs/day: 1 pack/day for 25.0 years (25.0 ttl pk-yrs)     Types: Cigarettes     Start date: 01/1982     Quit date: 01/2007

## 2024-06-14 NOTE — DISCHARGE INSTRUCTIONS
[facility] WOUND and HYPERBARIC TREATMENT  CENTER      Visit  Discharge Instructions / Physician Orders  DATE:6/17/24     Home Care:NONE     SUPPLIES ORDERED THRU:     not ordered at this time                DATE LAST SUPPLIED     Wound Location:  Left Breast     Cleanse with: Liquid antibacterial soap and water, rinse well      Dressing Orders:  Primary dressing   Triad cream dime thickness   Secondary dressing    cover with bandaide or gauze    secure with           x 30days     Frequency:  daily     Additional Orders: Increase protein to diet (meat, cheese, eggs, fish, peanut butter, nuts and beans)  Multivitamin daily    OFFLOADING [] YES  TYPE:                  [x] NA    Weekly wound care visits until determined otherwise.    Antibiotic therapy-wound care related YES [] NO [] NA[]  Finish Keflex  MY CHART []     Smart Device  []     HYPERBARIC TREATMENT-                TREATMENT #                          Your next appointment with the Wound Care Center is in 1 week                                                                                                   (Please note your next appointment above and if you are unable to keep, kindly give a 24 hour notice. Thank you.)  If more than 15 min late we cannot guarantee you will be seen due to clinician schedule  Per Policy, Excessive cancellation will call for dismissal from program.  If you experience any of the following, please call the Wound Care Center during business hours:  [phone]     * Increase in Pain  * Temperature over 101  * Increase in drainage from your wound  * Drainage with a foul odor  * Bleeding  * Increase in swelling  * Need for compression bandage changes due to slippage, breakthrough drainage.     If you need medical attention outside of the business hours of the Wound Care Centers please contact your PCP or go to the nearest emergency room.     The information contained in the After Visit Summary has been reviewed with me, the

## 2024-06-17 ENCOUNTER — HOSPITAL ENCOUNTER (OUTPATIENT)
Dept: WOUND CARE | Age: 54
Discharge: HOME | End: 2024-06-17
Payer: COMMERCIAL

## 2024-06-17 VITALS
HEART RATE: 64 BPM | RESPIRATION RATE: 18 BRPM | HEIGHT: 68 IN | DIASTOLIC BLOOD PRESSURE: 96 MMHG | TEMPERATURE: 98.1 F | BODY MASS INDEX: 36.83 KG/M2 | WEIGHT: 243 LBS | SYSTOLIC BLOOD PRESSURE: 142 MMHG

## 2024-06-17 DIAGNOSIS — S21.002A OPEN WOUND OF LEFT BREAST, INITIAL ENCOUNTER: Primary | ICD-10-CM

## 2024-06-17 PROCEDURE — 99203 OFFICE O/P NEW LOW 30 MIN: CPT | Performed by: INTERNAL MEDICINE

## 2024-06-17 PROCEDURE — 99213 OFFICE O/P EST LOW 20 MIN: CPT

## 2024-06-17 PROCEDURE — 11042 DBRDMT SUBQ TIS 1ST 20SQCM/<: CPT

## 2024-06-17 PROCEDURE — 11042 DBRDMT SUBQ TIS 1ST 20SQCM/<: CPT | Performed by: INTERNAL MEDICINE

## 2024-06-17 RX ORDER — TRIAMCINOLONE ACETONIDE 1 MG/G
OINTMENT TOPICAL ONCE
OUTPATIENT
Start: 2024-06-17 | End: 2024-06-17

## 2024-06-17 RX ORDER — LIDOCAINE 50 MG/G
OINTMENT TOPICAL ONCE
OUTPATIENT
Start: 2024-06-17 | End: 2024-06-17

## 2024-06-17 RX ORDER — IBUPROFEN 200 MG
TABLET ORAL ONCE
OUTPATIENT
Start: 2024-06-17 | End: 2024-06-17

## 2024-06-17 RX ORDER — CLOBETASOL PROPIONATE 0.5 MG/G
OINTMENT TOPICAL ONCE
OUTPATIENT
Start: 2024-06-17 | End: 2024-06-17

## 2024-06-17 RX ORDER — LIDOCAINE 40 MG/G
CREAM TOPICAL ONCE
OUTPATIENT
Start: 2024-06-17 | End: 2024-06-17

## 2024-06-17 RX ORDER — SODIUM CHLOR/HYPOCHLOROUS ACID 0.033 %
SOLUTION, IRRIGATION IRRIGATION ONCE
OUTPATIENT
Start: 2024-06-17 | End: 2024-06-17

## 2024-06-17 RX ORDER — LIDOCAINE HYDROCHLORIDE 20 MG/ML
JELLY TOPICAL ONCE
OUTPATIENT
Start: 2024-06-17 | End: 2024-06-17

## 2024-06-17 RX ORDER — GINSENG 100 MG
CAPSULE ORAL ONCE
OUTPATIENT
Start: 2024-06-17 | End: 2024-06-17

## 2024-06-17 RX ORDER — BETAMETHASONE DIPROPIONATE 0.5 MG/G
CREAM TOPICAL ONCE
OUTPATIENT
Start: 2024-06-17 | End: 2024-06-17

## 2024-06-17 RX ORDER — LIDOCAINE HYDROCHLORIDE 40 MG/ML
SOLUTION TOPICAL ONCE
OUTPATIENT
Start: 2024-06-17 | End: 2024-06-17

## 2024-06-17 RX ORDER — GENTAMICIN SULFATE 1 MG/G
OINTMENT TOPICAL ONCE
OUTPATIENT
Start: 2024-06-17 | End: 2024-06-17

## 2024-06-17 RX ORDER — BACITRACIN ZINC AND POLYMYXIN B SULFATE 500; 1000 [USP'U]/G; [USP'U]/G
OINTMENT TOPICAL ONCE
OUTPATIENT
Start: 2024-06-17 | End: 2024-06-17

## 2024-06-17 ASSESSMENT — PAIN SCALES - GENERAL: PAINLEVEL_OUTOF10: 0

## 2024-06-17 NOTE — PROGRESS NOTES
Readings from Last 3 Encounters:   06/17/24 110.2 kg (243 lb)   03/21/24 110.5 kg (243 lb 8 oz)   01/31/24 108.9 kg (240 lb)       PHYSICAL EXAM    General Appearance: alert and oriented to person, place and time, well developed and well- nourished, in no acute distress  Skin: warm and dry, no rash   Head: normocephalic and atraumatic  Eyes: pupils equal, round  Neck: supple  Pulmonary/Chest: normal air movement, no respiratory distress  Extremities: no cyanosis, clubbing or edema  Musculoskeletal: normal range of motion, no joint swelling, deformity or tenderness        Assessment:     Problem List Items Addressed This Visit    None  Visit Diagnoses       Open wound of left breast, initial encounter    -  Primary             Procedure Note  Indications:  Based on my examination of this patient's wound(s)/ulcer(s) today, debridement is required to promote healing and evaluate the wound base.    Performed by: Salina Hollingsworth MD    Consent obtained:  Yes    Time out taken:  Yes    Pain Control: Anesthetic  Anesthetic: 4% Lidocaine Liquid Topical       Debridement:Excisional Debridement    Using curette the wound(s)/ulcer(s) was/were sharply debrided down through and including the removal of epidermis and subcutaneous tissue.        Devitalized Tissue Debrided:  fibrin, biofilm, and slough    Pre Debridement Measurements:  Are located in the Wound/Ulcer Documentation Flow Sheet    Wound/Ulcer #: 1    Post Debridement Measurements:  Wound/Ulcer Descriptions are Pre Debridement except measurements:        Wound 06/17/24 Breast Left wound #1 left medial breast (Active)   Wound Image   06/17/24 1025   Wound Etiology Other 06/17/24 1025   Dressing Status Old drainage noted 06/17/24 1025   Wound Cleansed Cleansed with saline 06/17/24 1025   Wound Length (cm) 0.6 cm 06/17/24 1025   Wound Width (cm) 0.5 cm 06/17/24 1025   Wound Depth (cm) 0.1 cm 06/17/24 1025   Wound Surface Area (cm^2) 0.3 cm^2 06/17/24 1025   Wound Volume (cm^3)

## 2024-06-17 NOTE — PLAN OF CARE
Problem: Chronic Conditions and Co-morbidities  Goal: Patient's chronic conditions and co-morbidity symptoms are monitored and maintained or improved  Outcome: Progressing     Problem: Pain  Goal: Verbalizes/displays adequate comfort level or baseline comfort level  Outcome: Progressing     Problem: Cognitive:  Goal: Knowledge of wound care  Description: Knowledge of wound care  Outcome: Progressing  Goal: Understands risk factors for wounds  Description: Understands risk factors for wounds  Outcome: Progressing

## 2024-06-24 ENCOUNTER — HOSPITAL ENCOUNTER (OUTPATIENT)
Dept: WOUND CARE | Age: 54
Discharge: HOME | End: 2024-06-24
Payer: COMMERCIAL

## 2024-06-24 VITALS
HEIGHT: 68 IN | DIASTOLIC BLOOD PRESSURE: 95 MMHG | BODY MASS INDEX: 36.83 KG/M2 | HEART RATE: 64 BPM | TEMPERATURE: 95.9 F | RESPIRATION RATE: 18 BRPM | WEIGHT: 243 LBS | SYSTOLIC BLOOD PRESSURE: 152 MMHG

## 2024-06-24 DIAGNOSIS — S21.002A OPEN WOUND OF LEFT BREAST, INITIAL ENCOUNTER: Primary | ICD-10-CM

## 2024-06-24 PROCEDURE — 11042 DBRDMT SUBQ TIS 1ST 20SQCM/<: CPT

## 2024-06-24 PROCEDURE — 11042 DBRDMT SUBQ TIS 1ST 20SQCM/<: CPT | Performed by: INTERNAL MEDICINE

## 2024-06-24 RX ORDER — GINSENG 100 MG
CAPSULE ORAL ONCE
OUTPATIENT
Start: 2024-06-24 | End: 2024-06-24

## 2024-06-24 RX ORDER — LIDOCAINE HYDROCHLORIDE 20 MG/ML
JELLY TOPICAL ONCE
OUTPATIENT
Start: 2024-06-24 | End: 2024-06-24

## 2024-06-24 RX ORDER — GENTAMICIN SULFATE 1 MG/G
OINTMENT TOPICAL ONCE
OUTPATIENT
Start: 2024-06-24 | End: 2024-06-24

## 2024-06-24 RX ORDER — LIDOCAINE 50 MG/G
OINTMENT TOPICAL ONCE
OUTPATIENT
Start: 2024-06-24 | End: 2024-06-24

## 2024-06-24 RX ORDER — BETAMETHASONE DIPROPIONATE 0.5 MG/G
CREAM TOPICAL ONCE
OUTPATIENT
Start: 2024-06-24 | End: 2024-06-24

## 2024-06-24 RX ORDER — IBUPROFEN 200 MG
TABLET ORAL ONCE
OUTPATIENT
Start: 2024-06-24 | End: 2024-06-24

## 2024-06-24 RX ORDER — BACITRACIN ZINC AND POLYMYXIN B SULFATE 500; 1000 [USP'U]/G; [USP'U]/G
OINTMENT TOPICAL ONCE
OUTPATIENT
Start: 2024-06-24 | End: 2024-06-24

## 2024-06-24 RX ORDER — SODIUM CHLOR/HYPOCHLOROUS ACID 0.033 %
SOLUTION, IRRIGATION IRRIGATION ONCE
OUTPATIENT
Start: 2024-06-24 | End: 2024-06-24

## 2024-06-24 RX ORDER — LIDOCAINE HYDROCHLORIDE 40 MG/ML
SOLUTION TOPICAL ONCE
Status: COMPLETED | OUTPATIENT
Start: 2024-06-24 | End: 2024-06-24

## 2024-06-24 RX ORDER — CLOBETASOL PROPIONATE 0.5 MG/G
OINTMENT TOPICAL ONCE
OUTPATIENT
Start: 2024-06-24 | End: 2024-06-24

## 2024-06-24 RX ORDER — TRIAMCINOLONE ACETONIDE 1 MG/G
OINTMENT TOPICAL ONCE
OUTPATIENT
Start: 2024-06-24 | End: 2024-06-24

## 2024-06-24 RX ORDER — LIDOCAINE HYDROCHLORIDE 40 MG/ML
SOLUTION TOPICAL ONCE
OUTPATIENT
Start: 2024-06-24 | End: 2024-06-24

## 2024-06-24 RX ORDER — LIDOCAINE 40 MG/G
CREAM TOPICAL ONCE
OUTPATIENT
Start: 2024-06-24 | End: 2024-06-24

## 2024-06-24 RX ADMIN — LIDOCAINE HYDROCHLORIDE 5 ML: 40 SOLUTION TOPICAL at 08:43

## 2024-06-24 ASSESSMENT — PAIN - FUNCTIONAL ASSESSMENT: PAIN_FUNCTIONAL_ASSESSMENT: ACTIVITIES ARE NOT PREVENTED

## 2024-06-24 ASSESSMENT — PAIN SCALES - GENERAL: PAINLEVEL_OUTOF10: 0

## 2024-06-24 NOTE — DISCHARGE INSTRUCTIONS
[facility] WOUND and HYPERBARIC TREATMENT  CENTER                            Visit  Discharge Instructions / Physician Orders  DATE:6/24/24     Home Care:NONE     SUPPLIES ORDERED THRU:     not ordered at this time                DATE LAST SUPPLIED     Wound Location:  Left Breast     Cleanse with: Liquid antibacterial soap and water, rinse well      Dressing Orders:  Primary dressing   Maia   Secondary dressing    cover with bandaide or gauze    secure with           x 30days     Frequency:  daily     Additional Orders: Increase protein to diet (meat, cheese, eggs, fish, peanut butter, nuts and beans)  Multivitamin daily     OFFLOADING [] YES  TYPE:                  [x] NA     Weekly wound care visits until determined otherwise.     Antibiotic therapy-wound care related YES [] NO [] NA[]  Finish Keflex-completed  MY CHART []     Smart Device  []      HYPERBARIC TREATMENT-                TREATMENT #                          Your next appointment with the Wound Care Center is in 1 week                                                                                                   (Please note your next appointment above and if you are unable to keep, kindly give a 24 hour notice. Thank you.)  If more than 15 min late we cannot guarantee you will be seen due to clinician schedule  Per Policy, Excessive cancellation will call for dismissal from program.  If you experience any of the following, please call the Wound Care Center during business hours:  [phone]     * Increase in Pain  * Temperature over 101  * Increase in drainage from your wound  * Drainage with a foul odor  * Bleeding  * Increase in swelling  * Need for compression bandage changes due to slippage, breakthrough drainage.     If you need medical attention outside of the business hours of the Wound Care Centers please contact your PCP or go to the nearest emergency room.     The information contained in the After Visit Summary has been reviewed

## 2024-06-24 NOTE — PROGRESS NOTES
[facility]   Progress Note and Procedure Note      Cheyanne Atkinson  MEDICAL RECORD NUMBER:  [id number]  AGE: 53 y.o.   GENDER: female  : 1970  EPISODE DATE:  2024    Subjective:     Chief Complaint   Patient presents with    Wound Check     Left breast         HISTORY of PRESENT ILLNESS HPI     Cheyanne Atkinson is a 53 y.o. female who presents today for wound/ulcer evaluation.   History of Wound Context: The patient is here for a left breast wound improving, decreasing in size, no surrounding redness, no purulent drainage.  The patient had blister to the left breast around 4 weeks ago that popped with surrounding redness  The patient went to walk-in clinic on 6/10/2024 was started on cephalexin course completed.  She denied fever or chills, no new complaints.          PAST MEDICAL HISTORY        Diagnosis Date    Blood circulation, collateral     CAD (coronary artery disease)     CHF (congestive heart failure) (HCC)     Diabetes mellitus (HCC)     GERD (gastroesophageal reflux disease)     Hx of blood clots     Bilateral legs    Hyperlipidemia     Hypertension     Prediabetes        PAST SURGICAL HISTORY    Past Surgical History:   Procedure Laterality Date    CARDIAC DEFIBRILLATOR PLACEMENT  2021    CORONARY ARTERY BYPASS GRAFT      ENDOSCOPY, COLON, DIAGNOSTIC      EYE SURGERY      Eye Socket    FRACTURE SURGERY      Mina and pins in R. collar bone    PACEMAKER PLACEMENT      SHOULDER SURGERY Right     rods and pins placed    TONSILLECTOMY      UPPER GASTROINTESTINAL ENDOSCOPY N/A 2023    EGD BIOPSY performed by Zulma Mcdonough MD at [facility] OR       FAMILY HISTORY    Family History   Problem Relation Age of Onset    Heart Attack Mother     Heart Disease Father     Colon Cancer Brother     Breast Cancer Neg Hx        SOCIAL HISTORY    Social History     Tobacco Use    Smoking status: Former     Current packs/day: 0.00     Average packs/day: 1

## 2024-06-29 NOTE — DISCHARGE INSTRUCTIONS
[facility] WOUND and HYPERBARIC TREATMENT  CENTER                            Visit  Discharge Instructions / Physician Orders  DATE:7/1/24     Home Care:NONE     SUPPLIES ORDERED THRU:     not ordered at this time                DATE LAST SUPPLIED     Wound Location:  Left Breast     Cleanse with: Liquid antibacterial soap and water, rinse well      Dressing Orders:  Primary dressing   Maia Apply 1-2 drops of normal saline  Secondary dressing    cover gentac   secure with           x 30days     Frequency:  daily     Additional Orders: Increase protein to diet (meat, cheese, eggs, fish, peanut butter, nuts and beans)  Multivitamin daily     OFFLOADING [] YES  TYPE:                  [x] NA     Weekly wound care visits until determined otherwise.     Antibiotic therapy-wound care related YES [] NO [] NA[]  Finish Keflex-completed  MY CHART []     Smart Device  []      HYPERBARIC TREATMENT-                TREATMENT #                          Your next appointment with the Wound Care Center is in 1 week                                                                                                   (Please note your next appointment above and if you are unable to keep, kindly give a 24 hour notice. Thank you.)  If more than 15 min late we cannot guarantee you will be seen due to clinician schedule  Per Policy, Excessive cancellation will call for dismissal from program.  If you experience any of the following, please call the Wound Care Center during business hours:  [phone]     * Increase in Pain  * Temperature over 101  * Increase in drainage from your wound  * Drainage with a foul odor  * Bleeding  * Increase in swelling  * Need for compression bandage changes due to slippage, breakthrough drainage.     If you need medical attention outside of the business hours of the Wound Care Centers please contact your PCP or go to the nearest emergency room.     The information contained in the After Visit Summary has

## 2024-07-01 ENCOUNTER — HOSPITAL ENCOUNTER (OUTPATIENT)
Dept: WOUND CARE | Age: 54
Discharge: HOME | End: 2024-07-01
Payer: COMMERCIAL

## 2024-07-01 ENCOUNTER — HOSPITAL ENCOUNTER (OUTPATIENT)
Dept: PHARMACY | Age: 54
Setting detail: THERAPIES SERIES
Discharge: HOME | End: 2024-07-01
Payer: COMMERCIAL

## 2024-07-01 VITALS
DIASTOLIC BLOOD PRESSURE: 78 MMHG | SYSTOLIC BLOOD PRESSURE: 118 MMHG | RESPIRATION RATE: 16 BRPM | HEART RATE: 65 BPM | TEMPERATURE: 97.4 F

## 2024-07-01 DIAGNOSIS — Z86.718 HX OF DEEP VENOUS THROMBOSIS: Primary | ICD-10-CM

## 2024-07-01 DIAGNOSIS — S21.002A OPEN WOUND OF LEFT BREAST, INITIAL ENCOUNTER: Primary | ICD-10-CM

## 2024-07-01 LAB
INR BLD: 3.2
PROTIME: 38.5 SECONDS

## 2024-07-01 PROCEDURE — 11042 DBRDMT SUBQ TIS 1ST 20SQCM/<: CPT

## 2024-07-01 PROCEDURE — 11042 DBRDMT SUBQ TIS 1ST 20SQCM/<: CPT | Performed by: INTERNAL MEDICINE

## 2024-07-01 PROCEDURE — 85610 PROTHROMBIN TIME: CPT

## 2024-07-01 PROCEDURE — 99213 OFFICE O/P EST LOW 20 MIN: CPT

## 2024-07-01 RX ORDER — GINSENG 100 MG
CAPSULE ORAL ONCE
OUTPATIENT
Start: 2024-07-01 | End: 2024-07-01

## 2024-07-01 RX ORDER — TRIAMCINOLONE ACETONIDE 1 MG/G
OINTMENT TOPICAL ONCE
OUTPATIENT
Start: 2024-07-01 | End: 2024-07-01

## 2024-07-01 RX ORDER — LIDOCAINE 50 MG/G
OINTMENT TOPICAL ONCE
OUTPATIENT
Start: 2024-07-01 | End: 2024-07-01

## 2024-07-01 RX ORDER — LIDOCAINE HYDROCHLORIDE 40 MG/ML
SOLUTION TOPICAL ONCE
OUTPATIENT
Start: 2024-07-01 | End: 2024-07-01

## 2024-07-01 RX ORDER — GENTAMICIN SULFATE 1 MG/G
OINTMENT TOPICAL ONCE
OUTPATIENT
Start: 2024-07-01 | End: 2024-07-01

## 2024-07-01 RX ORDER — IBUPROFEN 200 MG
TABLET ORAL ONCE
OUTPATIENT
Start: 2024-07-01 | End: 2024-07-01

## 2024-07-01 RX ORDER — LIDOCAINE HYDROCHLORIDE 20 MG/ML
JELLY TOPICAL ONCE
OUTPATIENT
Start: 2024-07-01 | End: 2024-07-01

## 2024-07-01 RX ORDER — LIDOCAINE HYDROCHLORIDE 40 MG/ML
SOLUTION TOPICAL ONCE
Status: COMPLETED | OUTPATIENT
Start: 2024-07-01 | End: 2024-07-01

## 2024-07-01 RX ORDER — BETAMETHASONE DIPROPIONATE 0.5 MG/G
CREAM TOPICAL ONCE
OUTPATIENT
Start: 2024-07-01 | End: 2024-07-01

## 2024-07-01 RX ORDER — LIDOCAINE 40 MG/G
CREAM TOPICAL ONCE
OUTPATIENT
Start: 2024-07-01 | End: 2024-07-01

## 2024-07-01 RX ORDER — BACITRACIN ZINC AND POLYMYXIN B SULFATE 500; 1000 [USP'U]/G; [USP'U]/G
OINTMENT TOPICAL ONCE
OUTPATIENT
Start: 2024-07-01 | End: 2024-07-01

## 2024-07-01 RX ORDER — SODIUM CHLOR/HYPOCHLOROUS ACID 0.033 %
SOLUTION, IRRIGATION IRRIGATION ONCE
OUTPATIENT
Start: 2024-07-01 | End: 2024-07-01

## 2024-07-01 RX ORDER — CLOBETASOL PROPIONATE 0.5 MG/G
OINTMENT TOPICAL ONCE
OUTPATIENT
Start: 2024-07-01 | End: 2024-07-01

## 2024-07-01 RX ADMIN — LIDOCAINE HYDROCHLORIDE 10 ML: 40 SOLUTION TOPICAL at 11:03

## 2024-07-01 ASSESSMENT — PAIN SCALES - GENERAL: PAINLEVEL_OUTOF10: 0

## 2024-07-01 NOTE — PROGRESS NOTES
Medication Management Service, Warfarin Management  [facility] Medication Management, [phone]  Visit Date: 2024   Subjective:   Cheyanne Atkinson is a 53 y.o. female who presents to clinic today for anticoagulation monitoring and adjustment.    Patient was referred for warfarin management due to  Indication:   DVT.   INR goal: of 2.0-3.0.  Duration of therapy: indefinite.    Patient reports the following:   Adherent with regimen:  Yes  Missed or extra doses:  None   Bleeding or thromboembolic side effects:   no bleeding but does have a bruise on wound/site of SSTI    Significant medication, dietary, alcohol, or tobacco changes:   \"better\" eating habits - more food made at home vs fast food because her mom is doing better health-wise at the moment, recent 10-day course of Keflex for SSTI    Significant recent illness, disease state changes, or hospitalization:   Going to wound care for SSTI, improving and not on any antibiotics currently    Upcoming surgeries or procedures:  None           Assessment and PLAN   PT/INR done in office per protocol.     INR today is 3.2, supratherapeutic.    Plan:  Take 2.5 mg dose today, then continue maintenance dose of 5 mg on MWF and 2.5 mg all other days.  Using warfarin 5 mg tablets. Suspect elevated INR may be still due to recent antibiotic course.    Recheck INR in 2 week(s).     Patient verbalized understanding of dosing directions and information discussed. Dosing schedule given to patient. Progress note sent to referring office.  Patient acknowledges working in consult agreement with pharmacist as referred by his/her physician.      Electronically signed by Sailaja Bonilla RPH on 24 at 8:16 AM EDT    For Pharmacy Admin Tracking Only    Intervention Detail: Adherence Monitorin and Dose Adjustment: 1, reason: Therapy Optimization  Total # of Interventions Recommended: 3  Total # of Interventions Accepted: 3  Time Spent (min): 20.

## 2024-07-01 NOTE — PROGRESS NOTES
[facility]   Progress Note and Procedure Note      Cheyanne Atkinson  MEDICAL RECORD NUMBER:  [id number]  AGE: 53 y.o.   GENDER: female  : 1970  EPISODE DATE:  2024    Subjective:     Chief Complaint   Patient presents with    Wound Check     Left breast         HISTORY of PRESENT ILLNESS HPI     Cheyanne Atkinson is a 53 y.o. female who presents today for wound/ulcer evaluation.   History of Wound Context: The patient is here for a left breast wound, no purulent drainage, surrounding no surrounding redness .  The patient went to walk-in clinic on 6/10/2024 was started on cephalexin course completed.  She denied fever or chills, no new complaints.          PAST MEDICAL HISTORY        Diagnosis Date    Blood circulation, collateral     CAD (coronary artery disease)     CHF (congestive heart failure) (HCC)     Diabetes mellitus (HCC)     GERD (gastroesophageal reflux disease)     Hx of blood clots     Bilateral legs    Hyperlipidemia     Hypertension     Prediabetes        PAST SURGICAL HISTORY    Past Surgical History:   Procedure Laterality Date    CARDIAC DEFIBRILLATOR PLACEMENT  2021    CORONARY ARTERY BYPASS GRAFT      ENDOSCOPY, COLON, DIAGNOSTIC      EYE SURGERY      Eye Socket    FRACTURE SURGERY      Mina and pins in R. collar bone    PACEMAKER PLACEMENT      SHOULDER SURGERY Right     rods and pins placed    TONSILLECTOMY      UPPER GASTROINTESTINAL ENDOSCOPY N/A 2023    EGD BIOPSY performed by Zulma Mcdonough MD at [facility] OR       FAMILY HISTORY    Family History   Problem Relation Age of Onset    Heart Attack Mother     Heart Disease Father     Colon Cancer Brother     Breast Cancer Neg Hx        SOCIAL HISTORY    Social History     Tobacco Use    Smoking status: Former     Current packs/day: 0.00     Average packs/day: 1 pack/day for 25.0 years (25.0 ttl pk-yrs)     Types: Cigarettes     Start date: 1982     Quit date: 2007

## 2024-07-06 NOTE — DISCHARGE INSTRUCTIONS
had the opportunity to ask questions regarding this information. I have elected to receive;      []After Visit Summary  [x]Comprehensive Discharge Instruction        Patient signature______________________________________Date:___  Electronically signed by Carl Dyson RN on 7/8/2024 at 10:14 AM   Electronically signed by Salina Hollingsworth MD on 7/8/2024 at 10:18 AM

## 2024-07-08 ENCOUNTER — HOSPITAL ENCOUNTER (OUTPATIENT)
Dept: WOUND CARE | Age: 54
Discharge: HOME | End: 2024-07-08
Payer: COMMERCIAL

## 2024-07-08 VITALS
SYSTOLIC BLOOD PRESSURE: 124 MMHG | TEMPERATURE: 97.1 F | HEART RATE: 60 BPM | HEIGHT: 68 IN | DIASTOLIC BLOOD PRESSURE: 84 MMHG | RESPIRATION RATE: 16 BRPM | WEIGHT: 243 LBS | BODY MASS INDEX: 36.83 KG/M2

## 2024-07-08 DIAGNOSIS — S21.002A OPEN WOUND OF LEFT BREAST, INITIAL ENCOUNTER: Primary | ICD-10-CM

## 2024-07-08 PROCEDURE — 99213 OFFICE O/P EST LOW 20 MIN: CPT | Performed by: INTERNAL MEDICINE

## 2024-07-08 PROCEDURE — 99212 OFFICE O/P EST SF 10 MIN: CPT

## 2024-07-08 RX ORDER — IBUPROFEN 200 MG
TABLET ORAL ONCE
OUTPATIENT
Start: 2024-07-08 | End: 2024-07-08

## 2024-07-08 RX ORDER — LIDOCAINE 40 MG/G
CREAM TOPICAL ONCE
OUTPATIENT
Start: 2024-07-08 | End: 2024-07-08

## 2024-07-08 RX ORDER — LIDOCAINE HYDROCHLORIDE 20 MG/ML
JELLY TOPICAL ONCE
OUTPATIENT
Start: 2024-07-08 | End: 2024-07-08

## 2024-07-08 RX ORDER — TRIAMCINOLONE ACETONIDE 1 MG/G
OINTMENT TOPICAL ONCE
OUTPATIENT
Start: 2024-07-08 | End: 2024-07-08

## 2024-07-08 RX ORDER — LIDOCAINE HYDROCHLORIDE 40 MG/ML
SOLUTION TOPICAL ONCE
OUTPATIENT
Start: 2024-07-08 | End: 2024-07-08

## 2024-07-08 RX ORDER — GENTAMICIN SULFATE 1 MG/G
OINTMENT TOPICAL ONCE
OUTPATIENT
Start: 2024-07-08 | End: 2024-07-08

## 2024-07-08 RX ORDER — BACITRACIN ZINC AND POLYMYXIN B SULFATE 500; 1000 [USP'U]/G; [USP'U]/G
OINTMENT TOPICAL ONCE
OUTPATIENT
Start: 2024-07-08 | End: 2024-07-08

## 2024-07-08 RX ORDER — GINSENG 100 MG
CAPSULE ORAL ONCE
OUTPATIENT
Start: 2024-07-08 | End: 2024-07-08

## 2024-07-08 RX ORDER — CLOBETASOL PROPIONATE 0.5 MG/G
OINTMENT TOPICAL ONCE
OUTPATIENT
Start: 2024-07-08 | End: 2024-07-08

## 2024-07-08 RX ORDER — LIDOCAINE 50 MG/G
OINTMENT TOPICAL ONCE
OUTPATIENT
Start: 2024-07-08 | End: 2024-07-08

## 2024-07-08 RX ORDER — SODIUM CHLOR/HYPOCHLOROUS ACID 0.033 %
SOLUTION, IRRIGATION IRRIGATION ONCE
OUTPATIENT
Start: 2024-07-08 | End: 2024-07-08

## 2024-07-08 RX ORDER — LIDOCAINE HYDROCHLORIDE 40 MG/ML
SOLUTION TOPICAL ONCE
Status: DISCONTINUED | OUTPATIENT
Start: 2024-07-08 | End: 2024-07-09 | Stop reason: HOSPADM

## 2024-07-08 RX ORDER — BETAMETHASONE DIPROPIONATE 0.5 MG/G
CREAM TOPICAL ONCE
OUTPATIENT
Start: 2024-07-08 | End: 2024-07-08

## 2024-07-08 ASSESSMENT — PAIN SCALES - GENERAL: PAINLEVEL_OUTOF10: 0

## 2024-07-08 NOTE — PROGRESS NOTES
[email or web address]     Passive exposure: Past    Smokeless tobacco: Never   Vaping Use    Vaping Use: Never used   Substance Use Topics    Alcohol use: Not Currently    Drug use: Not Currently       ALLERGIES    No Known Allergies    MEDICATIONS    Current Outpatient Medications on File Prior to Encounter   Medication Sig Dispense Refill    potassium chloride (KLOR-CON M20) 20 MEQ extended release tablet Take 1 tablet by mouth once daily 90 tablet 2    rosuvastatin (CRESTOR) 20 MG tablet Take 1 tablet by mouth once daily 90 tablet 2    empagliflozin (JARDIANCE) 10 MG tablet Take 1 tablet by mouth once daily 90 tablet 2    warfarin (COUMADIN) 5 MG tablet TAKE 1/2 TO 1 (ONE-HALF TO ONE) TABLET BY MOUTH ONCE DAILY AS DIRECTED 60 tablet 2    omeprazole (PRILOSEC) 20 MG delayed release capsule Take 1 capsule by mouth once daily 90 capsule 0    vitamin D (ERGOCALCIFEROL) 1.25 MG (12751 UT) CAPS capsule Take 1 capsule by mouth once a week 12 capsule 0    metoprolol succinate (TOPROL XL) 25 MG extended release tablet Take 2 tablets by mouth twice daily 120 tablet 3    sacubitril-valsartan (ENTRESTO) 49-51 MG per tablet Take 1 tablet by mouth twice daily 180 tablet 1    furosemide (LASIX) 40 MG tablet Take 1 tablet by mouth once daily 90 tablet 3    Handicap Placard MISC by Does not apply route For 5 years 1 each 0     No current facility-administered medications on file prior to encounter.       REVIEW OF SYSTEMS    As per history of present illness    Objective:      /84   Pulse 60   Temp 97.1 °F (36.2 °C) (Tympanic)   Resp 16   Ht 1.727 m (5' 8\")   Wt 110.2 kg (243 lb)   BMI 36.95 kg/m²     Wt Readings from Last 3 Encounters:   24 110.2 kg (243 lb)   24 110.2 kg (243 lb)   24 110.2 kg (243 lb)       PHYSICAL EXAM    General Appearance: alert and oriented to person, place and time, well developed and well- nourished, in no acute distress  Skin: warm and dry, no rash   Head: normocephalic and

## 2024-07-10 ENCOUNTER — OFFICE VISIT (OUTPATIENT)
Dept: PRIMARY CARE CLINIC | Age: 54
End: 2024-07-10
Payer: COMMERCIAL

## 2024-07-10 VITALS
SYSTOLIC BLOOD PRESSURE: 116 MMHG | OXYGEN SATURATION: 98 % | BODY MASS INDEX: 37.25 KG/M2 | HEART RATE: 85 BPM | DIASTOLIC BLOOD PRESSURE: 78 MMHG | WEIGHT: 245 LBS

## 2024-07-10 DIAGNOSIS — G89.29 CHRONIC PAIN OF RIGHT KNEE: ICD-10-CM

## 2024-07-10 DIAGNOSIS — I50.22 CHRONIC SYSTOLIC CONGESTIVE HEART FAILURE (HCC): ICD-10-CM

## 2024-07-10 DIAGNOSIS — I10 ESSENTIAL HYPERTENSION: Primary | ICD-10-CM

## 2024-07-10 DIAGNOSIS — B37.9 CANDIDIASIS: ICD-10-CM

## 2024-07-10 DIAGNOSIS — M25.561 CHRONIC PAIN OF RIGHT KNEE: ICD-10-CM

## 2024-07-10 DIAGNOSIS — Z86.718 HX OF DEEP VENOUS THROMBOSIS: ICD-10-CM

## 2024-07-10 PROCEDURE — 99214 OFFICE O/P EST MOD 30 MIN: CPT | Performed by: FAMILY MEDICINE

## 2024-07-10 PROCEDURE — 3074F SYST BP LT 130 MM HG: CPT | Performed by: FAMILY MEDICINE

## 2024-07-10 PROCEDURE — 1036F TOBACCO NON-USER: CPT | Performed by: FAMILY MEDICINE

## 2024-07-10 PROCEDURE — G8427 DOCREV CUR MEDS BY ELIG CLIN: HCPCS | Performed by: FAMILY MEDICINE

## 2024-07-10 PROCEDURE — G8417 CALC BMI ABV UP PARAM F/U: HCPCS | Performed by: FAMILY MEDICINE

## 2024-07-10 PROCEDURE — 3078F DIAST BP <80 MM HG: CPT | Performed by: FAMILY MEDICINE

## 2024-07-10 PROCEDURE — 3017F COLORECTAL CA SCREEN DOC REV: CPT | Performed by: FAMILY MEDICINE

## 2024-07-10 RX ORDER — FLUCONAZOLE 150 MG/1
150 TABLET ORAL ONCE
Qty: 1 TABLET | Refills: 0 | Status: SHIPPED | OUTPATIENT
Start: 2024-07-10 | End: 2024-07-10

## 2024-07-10 SDOH — ECONOMIC STABILITY: FOOD INSECURITY: WITHIN THE PAST 12 MONTHS, THE FOOD YOU BOUGHT JUST DIDN'T LAST AND YOU DIDN'T HAVE MONEY TO GET MORE.: NEVER TRUE

## 2024-07-10 SDOH — ECONOMIC STABILITY: INCOME INSECURITY: HOW HARD IS IT FOR YOU TO PAY FOR THE VERY BASICS LIKE FOOD, HOUSING, MEDICAL CARE, AND HEATING?: NOT HARD AT ALL

## 2024-07-10 SDOH — ECONOMIC STABILITY: FOOD INSECURITY: WITHIN THE PAST 12 MONTHS, YOU WORRIED THAT YOUR FOOD WOULD RUN OUT BEFORE YOU GOT MONEY TO BUY MORE.: NEVER TRUE

## 2024-07-10 ASSESSMENT — PATIENT HEALTH QUESTIONNAIRE - PHQ9
2. FEELING DOWN, DEPRESSED OR HOPELESS: NOT AT ALL
SUM OF ALL RESPONSES TO PHQ9 QUESTIONS 1 & 2: 0
1. LITTLE INTEREST OR PLEASURE IN DOING THINGS: NOT AT ALL
SUM OF ALL RESPONSES TO PHQ QUESTIONS 1-9: 0

## 2024-07-10 ASSESSMENT — ENCOUNTER SYMPTOMS
SHORTNESS OF BREATH: 0
VOMITING: 0

## 2024-07-10 NOTE — PROGRESS NOTES
this visit.     No Known Allergies    Health Maintenance   Topic Date Due    Hepatitis B vaccine (1 of 3 - 3-dose series) Never done    HIV screen  Never done    Hepatitis C screen  Never done    DTaP/Tdap/Td vaccine (1 - Tdap) Never done    Shingles vaccine (1 of 2) Never done    COVID-19 Vaccine (4 - 2023-24 season) 09/01/2023    Flu vaccine (1) 08/01/2024    Colorectal Cancer Screen  10/27/2024    Depression Screen  01/10/2025    A1C test (Diabetic or Prediabetic)  01/15/2025    Lipids  01/15/2025    Breast cancer screen  01/31/2026    Cervical cancer screen  01/10/2028    Pneumococcal 0-64 years Vaccine  Completed    Hepatitis A vaccine  Aged Out    Hib vaccine  Aged Out    Polio vaccine  Aged Out    Meningococcal (ACWY) vaccine  Aged Out       Subjective:      Review of Systems   Constitutional:  Negative for chills and fever.   Respiratory:  Negative for shortness of breath.    Gastrointestinal:  Negative for vomiting.   Genitourinary:  Positive for vaginal discharge.   Musculoskeletal:  Positive for arthralgias.       Objective:     Physical Exam  Constitutional:       Appearance: She is well-developed.   HENT:      Head: Normocephalic and atraumatic.      Right Ear: External ear normal.      Left Ear: External ear normal.   Eyes:      Conjunctiva/sclera: Conjunctivae normal.      Pupils: Pupils are equal, round, and reactive to light.   Cardiovascular:      Rate and Rhythm: Normal rate and regular rhythm.      Heart sounds: Normal heart sounds.   Pulmonary:      Effort: Pulmonary effort is normal.      Breath sounds: Normal breath sounds.   Musculoskeletal:      Cervical back: Neck supple.      Comments: No ttp of left knee. No swelling noted of left knee.    Skin:     General: Skin is warm and dry.   Neurological:      Mental Status: She is alert and oriented to person, place, and time.   Psychiatric:         Mood and Affect: Mood normal.         Behavior: Behavior normal.         Thought Content: Thought

## 2024-07-16 ENCOUNTER — HOSPITAL ENCOUNTER (OUTPATIENT)
Dept: PHARMACY | Age: 54
Setting detail: THERAPIES SERIES
Discharge: HOME | End: 2024-07-16
Payer: COMMERCIAL

## 2024-07-16 ENCOUNTER — HOSPITAL ENCOUNTER (OUTPATIENT)
Age: 54
Discharge: HOME | End: 2024-07-18
Payer: COMMERCIAL

## 2024-07-16 ENCOUNTER — HOSPITAL ENCOUNTER (OUTPATIENT)
Dept: GENERAL RADIOLOGY | Age: 54
Discharge: HOME | End: 2024-07-18
Payer: COMMERCIAL

## 2024-07-16 DIAGNOSIS — M25.561 CHRONIC PAIN OF RIGHT KNEE: ICD-10-CM

## 2024-07-16 DIAGNOSIS — G89.29 CHRONIC PAIN OF RIGHT KNEE: ICD-10-CM

## 2024-07-16 DIAGNOSIS — Z86.718 HX OF DEEP VENOUS THROMBOSIS: Primary | ICD-10-CM

## 2024-07-16 LAB
INR BLD: 2.7
PROTIME: 32.6 SECONDS

## 2024-07-16 PROCEDURE — 99211 OFF/OP EST MAY X REQ PHY/QHP: CPT

## 2024-07-16 PROCEDURE — 73562 X-RAY EXAM OF KNEE 3: CPT

## 2024-07-16 PROCEDURE — 85610 PROTHROMBIN TIME: CPT

## 2024-07-16 NOTE — PROGRESS NOTES
Medication Management Service, Warfarin Management  [facility] Medication Management, [phone]  Visit Date: 2024   Subjective:   Cheyanne Atkinson is a 54 y.o. female who presents to clinic today for anticoagulation monitoring and adjustment.    Patient was referred for warfarin management due to  Indication:   DVT.   INR goal: of 2.0-3.0.  Duration of therapy: indefinite.    Patient reports the following:   Adherent with regimen:  Yes  Missed or extra doses:  None   Bleeding or thromboembolic side effects:  None    Significant medication, dietary, alcohol, or tobacco changes:   was prescribed a dose of Fluconazole for yeast infection which she did not take yet but plans to take    Significant recent illness, disease state changes, or hospitalization:  None  Upcoming surgeries or procedures:  None           Assessment and PLAN   PT/INR done in office per protocol.     INR today is 2.7, therapeutic.    Plan:  Instructed the patient to continue the current warfarin regimen of 5 mg Mon, Wed, Fri, and 2.5 mg on all other days.  Using warfarin 5 mg tablets.    Recheck INR in 4 week(s).     Patient verbalized understanding of dosing directions and information discussed. Dosing schedule given to patient. Progress note sent to referring office.  Patient acknowledges working in consult agreement with pharmacist as referred by his/her physician.      Electronically signed by Sailaja Bonilla RPH on 24 at 10:50 AM EDT    For Pharmacy Admin Tracking Only    Intervention Detail: Adherence Monitorin  Total # of Interventions Recommended: 1  Total # of Interventions Accepted: 1  Time Spent (min): 20

## 2024-08-12 ENCOUNTER — ANTI-COAG VISIT (OUTPATIENT)
Age: 54
End: 2024-08-12
Payer: COMMERCIAL

## 2024-08-12 DIAGNOSIS — Z86.718 HX OF DEEP VENOUS THROMBOSIS: Primary | ICD-10-CM

## 2024-08-12 LAB
INR BLD: 3.8
PROTIME: 45.7 SECONDS

## 2024-08-12 PROCEDURE — 85610 PROTHROMBIN TIME: CPT

## 2024-08-12 PROCEDURE — 99211 OFF/OP EST MAY X REQ PHY/QHP: CPT

## 2024-08-12 NOTE — PROGRESS NOTES
Medication Management Service, Warfarin Management  [facility] Medication Management, [phone]  Visit Date: 2024   Subjective:   Cheyanne Atkinson is a 54 y.o. female who presents to clinic today for anticoagulation monitoring and adjustment.    Patient was referred for warfarin management due to  Indication:   DVT.   INR goal: of 2.0-3.0.  Duration of therapy: indefinite.    Patient reports the following:   Adherent with regimen:  Yes  Missed or extra doses:  None   Bleeding or thromboembolic side effects:  None    Significant medication, dietary, alcohol, or tobacco changes:   had one alcoholic drink (fuzzy navel) on , not typical for her to drink at all though    Significant recent illness, disease state changes, or hospitalization:   woke up with a sore throat, hasn't treated with anything, no other symptoms    Upcoming surgeries or procedures:  None           Assessment and PLAN   PT/INR done in office per protocol.     INR today is 3.8, supratherapeutic.    Plan:  Hold today's warfarin dose, then resume maintenance dose of warfarin 5 mg on Monday/Wednesday/Friday and 2.5 mg all other days. Patient's most recent INR was in range at this dose, her diet is not significantly different since then with exception of her alcoholic drink within the last week which is unusual for her. Using warfarin 5 mg tablets.    Recheck INR in 2-3 week(s).     Patient verbalized understanding of dosing directions and information discussed. Dosing schedule given to patient. Progress note sent to referring office.  Patient acknowledges working in consult agreement with pharmacist as referred by his/her physician.      Electronically signed by Sailaja Bonilla RPH on 24 at 7:51 AM EDT    For Pharmacy Admin Tracking Only    Intervention Detail: Adherence Monitorin and Dose Adjustment: 1, reason: Therapy Optimization  Total # of Interventions Recommended: 2  Total # of Interventions Accepted:

## 2024-08-20 DIAGNOSIS — I50.22 CHRONIC SYSTOLIC CONGESTIVE HEART FAILURE (HCC): ICD-10-CM

## 2024-08-21 RX ORDER — METOPROLOL SUCCINATE 25 MG/1
50 TABLET, EXTENDED RELEASE ORAL 2 TIMES DAILY
Qty: 120 TABLET | Refills: 0 | Status: SHIPPED | OUTPATIENT
Start: 2024-08-21

## 2024-08-30 ENCOUNTER — ANTI-COAG VISIT (OUTPATIENT)
Age: 54
End: 2024-08-30
Payer: COMMERCIAL

## 2024-08-30 DIAGNOSIS — Z86.718 HX OF DEEP VENOUS THROMBOSIS: Primary | ICD-10-CM

## 2024-08-30 LAB
INTERNATIONAL NORMALIZATION RATIO, POC: 3.9
PROTHROMBIN TIME, POC: 47.1

## 2024-08-30 PROCEDURE — 99211 OFF/OP EST MAY X REQ PHY/QHP: CPT

## 2024-08-30 PROCEDURE — 85610 PROTHROMBIN TIME: CPT

## 2024-08-30 NOTE — PROGRESS NOTES
Medication Management Service, Warfarin Management  [facility] Medication Management, [phone]  Visit Date: 2024   Subjective:   Cheyanne Atkinson is a 54 y.o. female who presents to clinic today for anticoagulation monitoring and adjustment.    Patient was referred for warfarin management due to  Indication:   DVT.   INR goal: of 2.0-3.0.  Duration of therapy: indefinite.    Patient reports the following:   Adherent with regimen:  Yes  Missed or extra doses:  None   Bleeding or thromboembolic side effects:  None  Significant medication, dietary, alcohol, or tobacco changes:  None  Significant recent illness, disease state changes, or hospitalization:  None  Upcoming surgeries or procedures:  None           Assessment and PLAN   PT/INR done in office per protocol.     INR today is 3.9, supratherapeutic with no obvious reason why.    Plan:  Instructed the patient to hold today's  doses, then begin 5 mg on Mon and Fri and 2.5 mg all other days.  Using warfarin 5 mg tablets.    Recheck INR in 2 week(s).     Patient verbalized understanding of dosing directions and information discussed. Dosing schedule given to patient. Progress note sent to referring office.  Patient acknowledges working in consult agreement with pharmacist as referred by his/her physician.      Electronically signed by RADHIKA HURTADO RPH on 24 at 11:28 AM EDT    For Pharmacy Admin Tracking Only    Intervention Detail: Adherence Monitorin  Total # of Interventions Recommended: 1  Total # of Interventions Accepted: 1  Time Spent (min): 20

## 2024-09-06 DIAGNOSIS — K21.9 GASTROESOPHAGEAL REFLUX DISEASE WITHOUT ESOPHAGITIS: ICD-10-CM

## 2024-09-12 ENCOUNTER — OFFICE VISIT (OUTPATIENT)
Dept: FAMILY MEDICINE CLINIC | Age: 54
End: 2024-09-12
Payer: COMMERCIAL

## 2024-09-12 ENCOUNTER — ANTI-COAG VISIT (OUTPATIENT)
Age: 54
End: 2024-09-12
Payer: COMMERCIAL

## 2024-09-12 VITALS
HEART RATE: 62 BPM | WEIGHT: 240 LBS | RESPIRATION RATE: 16 BRPM | SYSTOLIC BLOOD PRESSURE: 128 MMHG | TEMPERATURE: 97.8 F | OXYGEN SATURATION: 96 % | DIASTOLIC BLOOD PRESSURE: 74 MMHG | BODY MASS INDEX: 36.49 KG/M2

## 2024-09-12 DIAGNOSIS — Z86.718 HX OF DEEP VENOUS THROMBOSIS: Primary | ICD-10-CM

## 2024-09-12 DIAGNOSIS — J32.9 SINUSITIS, UNSPECIFIED CHRONICITY, UNSPECIFIED LOCATION: Primary | ICD-10-CM

## 2024-09-12 LAB
INTERNATIONAL NORMALIZATION RATIO, POC: 2.9
PROTHROMBIN TIME, POC: 35.1

## 2024-09-12 PROCEDURE — 85610 PROTHROMBIN TIME: CPT

## 2024-09-12 PROCEDURE — 3017F COLORECTAL CA SCREEN DOC REV: CPT | Performed by: NURSE PRACTITIONER

## 2024-09-12 PROCEDURE — 99211 OFF/OP EST MAY X REQ PHY/QHP: CPT

## 2024-09-12 PROCEDURE — 99213 OFFICE O/P EST LOW 20 MIN: CPT | Performed by: NURSE PRACTITIONER

## 2024-09-12 PROCEDURE — 1036F TOBACCO NON-USER: CPT | Performed by: NURSE PRACTITIONER

## 2024-09-12 PROCEDURE — 3078F DIAST BP <80 MM HG: CPT | Performed by: NURSE PRACTITIONER

## 2024-09-12 PROCEDURE — 3074F SYST BP LT 130 MM HG: CPT | Performed by: NURSE PRACTITIONER

## 2024-09-12 PROCEDURE — G8417 CALC BMI ABV UP PARAM F/U: HCPCS | Performed by: NURSE PRACTITIONER

## 2024-09-12 PROCEDURE — G8427 DOCREV CUR MEDS BY ELIG CLIN: HCPCS | Performed by: NURSE PRACTITIONER

## 2024-09-12 RX ORDER — BENZONATATE 200 MG/1
200 CAPSULE ORAL 3 TIMES DAILY PRN
Qty: 30 CAPSULE | Refills: 0 | Status: SHIPPED | OUTPATIENT
Start: 2024-09-12 | End: 2024-09-12

## 2024-09-12 ASSESSMENT — ENCOUNTER SYMPTOMS
SORE THROAT: 1
SINUS COMPLAINT: 1
TROUBLE SWALLOWING: 0
SINUS PAIN: 0
RHINORRHEA: 1
SINUS PRESSURE: 1
FACIAL SWELLING: 0
ABDOMINAL PAIN: 0
VOICE CHANGE: 0
WHEEZING: 0
COUGH: 1
SHORTNESS OF BREATH: 0
EYE DISCHARGE: 0

## 2024-09-30 DIAGNOSIS — I50.22 CHRONIC SYSTOLIC CONGESTIVE HEART FAILURE (HCC): ICD-10-CM

## 2024-09-30 RX ORDER — METOPROLOL SUCCINATE 25 MG/1
50 TABLET, EXTENDED RELEASE ORAL 2 TIMES DAILY
Qty: 120 TABLET | Refills: 0 | Status: SHIPPED | OUTPATIENT
Start: 2024-09-30

## 2024-10-10 ENCOUNTER — HOSPITAL ENCOUNTER (OUTPATIENT)
Age: 54
Discharge: HOME | End: 2024-10-10
Payer: COMMERCIAL

## 2024-10-10 ENCOUNTER — ANTI-COAG VISIT (OUTPATIENT)
Age: 54
End: 2024-10-10
Payer: COMMERCIAL

## 2024-10-10 DIAGNOSIS — E78.5 HYPERLIPIDEMIA, UNSPECIFIED HYPERLIPIDEMIA TYPE: ICD-10-CM

## 2024-10-10 DIAGNOSIS — Z86.718 HX OF DEEP VENOUS THROMBOSIS: Primary | ICD-10-CM

## 2024-10-10 LAB
AST SERPL-CCNC: 19 U/L
CHOLEST SERPL-MCNC: 214 MG/DL (ref 0–199)
CHOLESTEROL/HDL RATIO: 5
CK SERPL-CCNC: 89 U/L (ref 26–192)
HDLC SERPL-MCNC: 40 MG/DL
INTERNATIONAL NORMALIZATION RATIO, POC: 2.5
LDLC SERPL CALC-MCNC: 150 MG/DL (ref 0–100)
PROTHROMBIN TIME, POC: 30.5
TRIGL SERPL-MCNC: 121 MG/DL
VLDLC SERPL CALC-MCNC: 24 MG/DL

## 2024-10-10 PROCEDURE — 80061 LIPID PANEL: CPT

## 2024-10-10 PROCEDURE — 82550 ASSAY OF CK (CPK): CPT

## 2024-10-10 PROCEDURE — 85610 PROTHROMBIN TIME: CPT

## 2024-10-10 PROCEDURE — 84450 TRANSFERASE (AST) (SGOT): CPT

## 2024-10-10 PROCEDURE — 99211 OFF/OP EST MAY X REQ PHY/QHP: CPT

## 2024-10-10 PROCEDURE — 36415 COLL VENOUS BLD VENIPUNCTURE: CPT

## 2024-10-10 NOTE — PROGRESS NOTES
Medication Management Service, Warfarin Management  [facility] Medication Management, [phone]  Visit Date: 10/10/2024   Subjective:   Cheyanne Atkinson is a 54 y.o. female who presents to clinic today for anticoagulation monitoring and adjustment.    Patient was referred for warfarin management due to  Indication:   DVT.   INR goal: of 2.0-3.0.  Duration of therapy: indefinite.    Patient reports the following:   Adherent with regimen:  Yes  Missed or extra doses:  None   Bleeding or thromboembolic side effects:  None    Significant medication, dietary, alcohol, or tobacco changes:   took Augmentin x 10 days starting  - therapy completed , patient took adjusted dose as directed during that antibiotic course.    Significant recent illness, disease state changes, or hospitalization:  None  Upcoming surgeries or procedures:  None           Assessment and PLAN   PT/INR done in office per protocol.     INR today is 2.5, therapeutic.    Plan:  Instructed the patient to continue the current warfarin regimen of 5 mg Mon, Fri, and 2.5 mg on all other days.  Using warfarin 5 mg tablets.    Recheck INR in 4 week(s).     Patient verbalized understanding of dosing directions and information discussed. Dosing schedule given to patient. Progress note sent to referring office.  Patient acknowledges working in consult agreement with pharmacist as referred by his/her physician.      Electronically signed by Sailaja Bonilla RPH on 10/10/24 at 8:31 AM EDT    For Pharmacy Admin Tracking Only    Intervention Detail: Adherence Monitorin  Total # of Interventions Recommended: 1  Total # of Interventions Accepted: 1  Time Spent (min): 20

## 2024-10-14 DIAGNOSIS — I50.22 CHRONIC SYSTOLIC CONGESTIVE HEART FAILURE (HCC): ICD-10-CM

## 2024-10-15 RX ORDER — METOPROLOL SUCCINATE 25 MG/1
50 TABLET, EXTENDED RELEASE ORAL 2 TIMES DAILY
Qty: 120 TABLET | Refills: 0 | Status: SHIPPED | OUTPATIENT
Start: 2024-10-15

## 2024-11-07 NOTE — PROGRESS NOTES
Medication Management Service, Warfarin Management  [facility] Medication Management, [phone]  Visit Date: 2024   Subjective:   Cheyanne Atkinson is a 54 y.o. female who presents to clinic today for anticoagulation monitoring and adjustment.    Patient was referred for warfarin management due to  Indication:   DVT.   INR goal: of 2.0-3.0.  Duration of therapy: indefinite.    Patient reports the following:   Adherent with regimen:  Yes  Missed or extra doses:  None   Bleeding or thromboembolic side effects:  None    Significant medication, dietary, alcohol, or tobacco changes:   has taken some tylenol for headaches    Significant recent illness, disease state changes, or hospitalization:   recent headaches    Upcoming surgeries or procedures:  None           Assessment and PLAN   PT/INR done in office per protocol.     INR today is 2.1, therapeutic.    Plan:  Instructed the patient to continue the current warfarin regimen of 5 mg Mon, Fri, and 2.5 mg on all other days.  Using warfarin 5 mg tablets.    Recheck INR in 6 week(s). A 6-week f/u is reasonable considering this is the 3rd consecutive monthly INR in range.    Patient verbalized understanding of dosing directions and information discussed. Dosing schedule given to patient. Progress note sent to referring office.  Patient acknowledges working in consult agreement with pharmacist as referred by his/her physician.      Electronically signed by Sailaja Bonilla RPH on 24 at 6:55 PM EST    For Pharmacy Admin Tracking Only    Intervention Detail: Adherence Monitorin  Total # of Interventions Recommended: 1  Total # of Interventions Accepted: 1  Time Spent (min): 20

## 2024-11-08 ENCOUNTER — ANTI-COAG VISIT (OUTPATIENT)
Age: 54
End: 2024-11-08
Payer: COMMERCIAL

## 2024-11-08 DIAGNOSIS — Z86.718 HX OF DEEP VENOUS THROMBOSIS: Primary | ICD-10-CM

## 2024-11-08 LAB
INTERNATIONAL NORMALIZATION RATIO, POC: 2.1
PROTHROMBIN TIME, POC: 25.1

## 2024-11-08 PROCEDURE — 99211 OFF/OP EST MAY X REQ PHY/QHP: CPT

## 2024-11-08 PROCEDURE — 85610 PROTHROMBIN TIME: CPT

## 2024-11-22 DIAGNOSIS — E55.9 VITAMIN D DEFICIENCY: ICD-10-CM

## 2024-11-22 DIAGNOSIS — I50.22 CHRONIC SYSTOLIC CONGESTIVE HEART FAILURE (HCC): ICD-10-CM

## 2024-11-22 RX ORDER — METOPROLOL SUCCINATE 25 MG/1
50 TABLET, EXTENDED RELEASE ORAL 2 TIMES DAILY
Qty: 120 TABLET | Refills: 0 | Status: SHIPPED | OUTPATIENT
Start: 2024-11-22

## 2024-11-22 RX ORDER — ERGOCALCIFEROL 1.25 MG/1
50000 CAPSULE, LIQUID FILLED ORAL WEEKLY
Qty: 12 CAPSULE | Refills: 0 | Status: SHIPPED | OUTPATIENT
Start: 2024-11-22

## 2024-11-29 ENCOUNTER — OFFICE VISIT (OUTPATIENT)
Dept: FAMILY MEDICINE CLINIC | Age: 54
End: 2024-11-29
Payer: COMMERCIAL

## 2024-11-29 VITALS
SYSTOLIC BLOOD PRESSURE: 122 MMHG | BODY MASS INDEX: 37.1 KG/M2 | DIASTOLIC BLOOD PRESSURE: 78 MMHG | RESPIRATION RATE: 16 BRPM | OXYGEN SATURATION: 98 % | WEIGHT: 242 LBS | HEART RATE: 76 BPM | TEMPERATURE: 97.2 F

## 2024-11-29 DIAGNOSIS — J01.00 ACUTE NON-RECURRENT MAXILLARY SINUSITIS: Primary | ICD-10-CM

## 2024-11-29 PROCEDURE — 3074F SYST BP LT 130 MM HG: CPT | Performed by: NURSE PRACTITIONER

## 2024-11-29 PROCEDURE — 99213 OFFICE O/P EST LOW 20 MIN: CPT | Performed by: NURSE PRACTITIONER

## 2024-11-29 PROCEDURE — G8484 FLU IMMUNIZE NO ADMIN: HCPCS | Performed by: NURSE PRACTITIONER

## 2024-11-29 PROCEDURE — 3017F COLORECTAL CA SCREEN DOC REV: CPT | Performed by: NURSE PRACTITIONER

## 2024-11-29 PROCEDURE — G8427 DOCREV CUR MEDS BY ELIG CLIN: HCPCS | Performed by: NURSE PRACTITIONER

## 2024-11-29 PROCEDURE — 1036F TOBACCO NON-USER: CPT | Performed by: NURSE PRACTITIONER

## 2024-11-29 PROCEDURE — 3078F DIAST BP <80 MM HG: CPT | Performed by: NURSE PRACTITIONER

## 2024-11-29 PROCEDURE — G8417 CALC BMI ABV UP PARAM F/U: HCPCS | Performed by: NURSE PRACTITIONER

## 2024-11-29 RX ORDER — AMOXICILLIN 875 MG/1
875 TABLET, COATED ORAL 2 TIMES DAILY
Qty: 20 TABLET | Refills: 0 | Status: SHIPPED | OUTPATIENT
Start: 2024-11-29 | End: 2024-12-09

## 2024-11-29 RX ORDER — FLUTICASONE PROPIONATE 50 MCG
2 SPRAY, SUSPENSION (ML) NASAL DAILY
Qty: 16 G | Refills: 0 | Status: SHIPPED | OUTPATIENT
Start: 2024-11-29

## 2024-11-29 ASSESSMENT — ENCOUNTER SYMPTOMS
SINUS PAIN: 1
SWOLLEN GLANDS: 0
HOARSE VOICE: 0
VOICE CHANGE: 0
SINUS COMPLAINT: 1
SINUS PRESSURE: 1
CHOKING: 0
COUGH: 1
TROUBLE SWALLOWING: 0
STRIDOR: 0
SORE THROAT: 0
SHORTNESS OF BREATH: 0
WHEEZING: 0
CHEST TIGHTNESS: 0

## 2024-11-29 NOTE — PROGRESS NOTES
sacubitril-valsartan (ENTRESTO) 49-51 MG per tablet Take 1 tablet by mouth twice daily 180 tablet 1    potassium chloride (KLOR-CON M20) 20 MEQ extended release tablet Take 1 tablet by mouth once daily 90 tablet 2    rosuvastatin (CRESTOR) 20 MG tablet Take 1 tablet by mouth once daily 90 tablet 2    empagliflozin (JARDIANCE) 10 MG tablet Take 1 tablet by mouth once daily 90 tablet 2    warfarin (COUMADIN) 5 MG tablet TAKE 1/2 TO 1 (ONE-HALF TO ONE) TABLET BY MOUTH ONCE DAILY AS DIRECTED 60 tablet 2    furosemide (LASIX) 40 MG tablet Take 1 tablet by mouth once daily 90 tablet 3    omeprazole (PRILOSEC) 20 MG delayed release capsule Take 1 capsule by mouth once daily 90 capsule 0    Handicap Placard MISC by Does not apply route For 5 years 1 each 0     No current facility-administered medications for this visit.     No Known Allergies    Subjective:      Review of Systems   Constitutional:  Negative for activity change, appetite change, chills, diaphoresis, fatigue and fever.   HENT:  Positive for congestion, postnasal drip, sinus pressure and sinus pain. Negative for ear pain, hoarse voice, sneezing, sore throat, trouble swallowing and voice change.    Respiratory:  Positive for cough. Negative for choking, chest tightness, shortness of breath, wheezing and stridor.    Cardiovascular: Negative.    Musculoskeletal:  Negative for neck pain.   Neurological:  Negative for headaches.   All other systems reviewed and are negative.    14 systems reviewed and negative except as listed in HPI.    Objective:     Physical Exam  Vitals and nursing note reviewed.   Constitutional:       General: She is not in acute distress.     Appearance: Normal appearance. She is not ill-appearing, toxic-appearing or diaphoretic.   HENT:      Head: Normocephalic and atraumatic.      Right Ear: Tympanic membrane, ear canal and external ear normal.      Left Ear: Tympanic membrane, ear canal and external ear normal.      Nose: Congestion

## 2024-12-20 ENCOUNTER — ANTI-COAG VISIT (OUTPATIENT)
Age: 54
End: 2024-12-20
Payer: COMMERCIAL

## 2024-12-20 DIAGNOSIS — Z86.718 HX OF DEEP VENOUS THROMBOSIS: Primary | ICD-10-CM

## 2024-12-20 LAB
INTERNATIONAL NORMALIZATION RATIO, POC: 2.1
PROTHROMBIN TIME, POC: 25.5

## 2024-12-20 PROCEDURE — 85610 PROTHROMBIN TIME: CPT

## 2024-12-20 PROCEDURE — 99211 OFF/OP EST MAY X REQ PHY/QHP: CPT

## 2024-12-20 NOTE — PROGRESS NOTES
Medication Management Service, Warfarin Management  [facility] Medication Management, [phone]  Visit Date: 24  Subjective:   Cheyanne Atkinson is a 54 y.o. female who presents to clinic today for anticoagulation monitoring and adjustment.     Patient was referred for warfarin management due to  Indication:   DVT.   INR goal: of 2.0-3.0.  Duration of therapy: indefinite.     Patient reports the following:   Adherent with regimen:  Yes  Missed or extra doses:  None   Bleeding or thromboembolic side effects:  None     Significant medication, dietary, alcohol, or tobacco changes:   No     Significant recent illness, disease state changes, or hospitalization:  No     Upcoming surgeries or procedures:  None           Assessment and PLAN   PT/INR done in office per protocol.     INR today is 2.1, therapeutic.     Plan:  Instructed the patient to continue the current warfarin regimen of 5 mg Mon, Fri, and 2.5 mg on all other days.  Using warfarin 5 mg tablets.     Recheck INR in 6 week(s).      Patient verbalized understanding of dosing directions and information discussed. Dosing schedule given to patient. Progress note sent to referring office.  Patient acknowledges working in consult agreement with pharmacist as referred by his/her physician.       Electronically signed by Manolo Cameron [facility] on 24 at 11:44 AM EST     For Pharmacy Admin Tracking Only     Intervention Detail: Adherence Monitorin  Total # of Interventions Recommended: 0  Total # of Interventions Accepted: 0  Time Spent (min): 20

## 2025-01-13 ENCOUNTER — OFFICE VISIT (OUTPATIENT)
Dept: PRIMARY CARE CLINIC | Age: 55
End: 2025-01-13
Payer: COMMERCIAL

## 2025-01-13 VITALS
BODY MASS INDEX: 37.26 KG/M2 | WEIGHT: 243 LBS | OXYGEN SATURATION: 93 % | DIASTOLIC BLOOD PRESSURE: 80 MMHG | SYSTOLIC BLOOD PRESSURE: 130 MMHG | HEART RATE: 83 BPM

## 2025-01-13 DIAGNOSIS — E55.9 VITAMIN D DEFICIENCY: ICD-10-CM

## 2025-01-13 DIAGNOSIS — Z00.00 HEALTHCARE MAINTENANCE: Primary | ICD-10-CM

## 2025-01-13 DIAGNOSIS — R73.03 PREDIABETES: ICD-10-CM

## 2025-01-13 DIAGNOSIS — M72.2 PLANTAR FASCIITIS: ICD-10-CM

## 2025-01-13 DIAGNOSIS — I42.0 DILATED CARDIOMYOPATHY (HCC): ICD-10-CM

## 2025-01-13 PROCEDURE — 3075F SYST BP GE 130 - 139MM HG: CPT | Performed by: FAMILY MEDICINE

## 2025-01-13 PROCEDURE — 3079F DIAST BP 80-89 MM HG: CPT | Performed by: FAMILY MEDICINE

## 2025-01-13 PROCEDURE — 99396 PREV VISIT EST AGE 40-64: CPT | Performed by: FAMILY MEDICINE

## 2025-01-13 SDOH — ECONOMIC STABILITY: FOOD INSECURITY: WITHIN THE PAST 12 MONTHS, YOU WORRIED THAT YOUR FOOD WOULD RUN OUT BEFORE YOU GOT MONEY TO BUY MORE.: NEVER TRUE

## 2025-01-13 SDOH — ECONOMIC STABILITY: FOOD INSECURITY: WITHIN THE PAST 12 MONTHS, THE FOOD YOU BOUGHT JUST DIDN'T LAST AND YOU DIDN'T HAVE MONEY TO GET MORE.: NEVER TRUE

## 2025-01-13 ASSESSMENT — PATIENT HEALTH QUESTIONNAIRE - PHQ9
SUM OF ALL RESPONSES TO PHQ QUESTIONS 1-9: 0
SUM OF ALL RESPONSES TO PHQ9 QUESTIONS 1 & 2: 0
SUM OF ALL RESPONSES TO PHQ QUESTIONS 1-9: 0
SUM OF ALL RESPONSES TO PHQ QUESTIONS 1-9: 0
2. FEELING DOWN, DEPRESSED OR HOPELESS: NOT AT ALL
SUM OF ALL RESPONSES TO PHQ QUESTIONS 1-9: 0
1. LITTLE INTEREST OR PLEASURE IN DOING THINGS: NOT AT ALL

## 2025-01-13 ASSESSMENT — ENCOUNTER SYMPTOMS: VOMITING: 0

## 2025-01-13 NOTE — PROGRESS NOTES
[facility]  [facility] PRIMARY CARE  [address] [place] [address] [address]  [address]  [facility] [address]  Dept: [phone]  Dept Fax: [phone]    Cheyanne Atkinson is a 54 y.o. female who presents today for her medical conditions/complaints as noted below.  Cheyanne Atkinson is c/o of  Chief Complaint   Patient presents with    Annual Exam    Foot Pain     Bilateral foot pain, mostly right, especially in the morning, x2mos         HPI:     HPI    Here for her physical.    Lots of pain in her feet when she gets up. Feels like she has been walking all day when she wakes up. Pain in the bottom of her feet.    Follows regularly with cardiology- hx of dilated cardiomyopathy, AICD . Gets device check regularly.  On coumadin for hx of DVT      Hemoglobin A1C (%)   Date Value   01/15/2024 6.0   12/28/2022 6.0   12/06/2021 6.0             ( goal A1C is < 7)   No components found for: \"LABMICR\"  No components found for: \"LDLCHOLESTEROL\", \"LDLCALC\"    (goal LDL is <100)   AST (U/L)   Date Value   10/10/2024 19     ALT (U/L)   Date Value   01/15/2024 19     BUN (mg/dL)   Date Value   01/15/2024 15     BP Readings from Last 3 Encounters:   01/13/25 130/80   11/29/24 122/78   10/02/24 128/74          (goal 120/80)    Past Medical History:   Diagnosis Date    Blood circulation, collateral     CAD (coronary artery disease)     CHF (congestive heart failure) (HCC)     Diabetes mellitus (HCC)     GERD (gastroesophageal reflux disease)     Hx of blood clots     Bilateral legs    Hyperlipidemia     Hypertension     Prediabetes       Past Surgical History:   Procedure Laterality Date    CARDIAC DEFIBRILLATOR PLACEMENT  05/03/2021    CORONARY ARTERY BYPASS GRAFT  2004    ENDOSCOPY, COLON, DIAGNOSTIC      EYE SURGERY  1990    Eye Socket    FRACTURE SURGERY      Mina and pins in R. collar bone    PACEMAKER PLACEMENT      SHOULDER SURGERY Right 1994    rods and pins placed    TONSILLECTOMY      UPPER GASTROINTESTINAL

## 2025-01-14 ENCOUNTER — TELEPHONE (OUTPATIENT)
Dept: PRIMARY CARE CLINIC | Age: 55
End: 2025-01-14

## 2025-01-14 DIAGNOSIS — I50.22 CHRONIC SYSTOLIC CONGESTIVE HEART FAILURE (HCC): ICD-10-CM

## 2025-01-14 DIAGNOSIS — Z12.11 SCREENING FOR MALIGNANT NEOPLASM OF COLON: Primary | ICD-10-CM

## 2025-01-14 NOTE — TELEPHONE ENCOUNTER
Late entry  PCP asked writer to contact pt 1/13/25 after she left the office because it had not been discussed on whether pt would like a cologuard kit ordered or colonoscopy. Writer tried to reach pt, she did not answer & did not have VM set up.    Writer attempted to reach pt again today, pt was available to speak and is agreeable to proceeding with a colonoscopy. Writer advised that PCP will place order and someone will reach out to schedule the procedure.

## 2025-01-15 RX ORDER — METOPROLOL SUCCINATE 25 MG/1
50 TABLET, EXTENDED RELEASE ORAL 2 TIMES DAILY
Qty: 120 TABLET | Refills: 0 | Status: SHIPPED | OUTPATIENT
Start: 2025-01-15

## 2025-01-15 RX ORDER — WARFARIN SODIUM 5 MG/1
TABLET ORAL
Qty: 60 TABLET | Refills: 0 | Status: SHIPPED | OUTPATIENT
Start: 2025-01-15

## 2025-01-31 ENCOUNTER — HOSPITAL ENCOUNTER (OUTPATIENT)
Age: 55
Discharge: HOME | End: 2025-01-31
Payer: COMMERCIAL

## 2025-01-31 ENCOUNTER — ANTI-COAG VISIT (OUTPATIENT)
Age: 55
End: 2025-01-31
Payer: COMMERCIAL

## 2025-01-31 DIAGNOSIS — R73.03 PREDIABETES: ICD-10-CM

## 2025-01-31 DIAGNOSIS — E55.9 VITAMIN D DEFICIENCY: ICD-10-CM

## 2025-01-31 DIAGNOSIS — Z86.718 HX OF DEEP VENOUS THROMBOSIS: Primary | ICD-10-CM

## 2025-01-31 DIAGNOSIS — Z00.00 HEALTHCARE MAINTENANCE: ICD-10-CM

## 2025-01-31 LAB
25(OH)D3 SERPL-MCNC: 34.7 NG/ML (ref 30–100)
ALBUMIN SERPL-MCNC: 4 G/DL (ref 3.5–5.2)
ALBUMIN/GLOB SERPL: 1.3 {RATIO} (ref 1–2.5)
ALP SERPL-CCNC: 128 U/L (ref 35–104)
ALT SERPL-CCNC: 17 U/L (ref 5–33)
ANION GAP SERPL CALCULATED.3IONS-SCNC: 9 MMOL/L (ref 9–17)
AST SERPL-CCNC: 21 U/L
BASOPHILS # BLD: 0.1 K/UL (ref 0–0.2)
BASOPHILS NFR BLD: 2 % (ref 0–2)
BILIRUB SERPL-MCNC: 0.5 MG/DL (ref 0.3–1.2)
BUN SERPL-MCNC: 11 MG/DL (ref 6–20)
CALCIUM SERPL-MCNC: 9.2 MG/DL (ref 8.6–10.4)
CHLORIDE SERPL-SCNC: 105 MMOL/L (ref 98–107)
CHOLEST SERPL-MCNC: 225 MG/DL (ref 0–199)
CHOLESTEROL/HDL RATIO: 5.5
CO2 SERPL-SCNC: 27 MMOL/L (ref 20–31)
CREAT SERPL-MCNC: 0.7 MG/DL (ref 0.5–0.9)
EOSINOPHIL # BLD: 0.4 K/UL (ref 0–0.4)
EOSINOPHILS RELATIVE PERCENT: 6 % (ref 1–4)
ERYTHROCYTE [DISTWIDTH] IN BLOOD BY AUTOMATED COUNT: 13 % (ref 12.5–15.4)
EST. AVERAGE GLUCOSE BLD GHB EST-MCNC: 128 MG/DL
GFR, ESTIMATED: >90 ML/MIN/1.73M2
GLUCOSE SERPL-MCNC: 114 MG/DL (ref 70–99)
HBA1C MFR BLD: 6.1 % (ref 4–6)
HCT VFR BLD AUTO: 47.2 % (ref 36–46)
HDLC SERPL-MCNC: 41 MG/DL
HGB BLD-MCNC: 16.1 G/DL (ref 12–16)
INTERNATIONAL NORMALIZATION RATIO, POC: 2.1
LDLC SERPL CALC-MCNC: 162 MG/DL (ref 0–100)
LYMPHOCYTES NFR BLD: 2 K/UL (ref 1–4.8)
LYMPHOCYTES RELATIVE PERCENT: 32 % (ref 24–44)
MCH RBC QN AUTO: 31.4 PG (ref 26–34)
MCHC RBC AUTO-ENTMCNC: 34.2 G/DL (ref 31–37)
MCV RBC AUTO: 91.7 FL (ref 80–100)
MONOCYTES NFR BLD: 0.6 K/UL (ref 0.1–1.2)
MONOCYTES NFR BLD: 9 % (ref 2–11)
NEUTROPHILS NFR BLD: 51 % (ref 36–66)
NEUTS SEG NFR BLD: 3.2 K/UL (ref 1.8–7.7)
PLATELET # BLD AUTO: 250 K/UL (ref 140–450)
PMV BLD AUTO: 8.4 FL (ref 6–12)
POTASSIUM SERPL-SCNC: 4.3 MMOL/L (ref 3.7–5.3)
PROT SERPL-MCNC: 7.2 G/DL (ref 6.4–8.3)
PROTHROMBIN TIME, POC: 25.2
RBC # BLD AUTO: 5.14 M/UL (ref 4–5.2)
SODIUM SERPL-SCNC: 141 MMOL/L (ref 135–144)
TRIGL SERPL-MCNC: 112 MG/DL
VLDLC SERPL CALC-MCNC: 22 MG/DL (ref 1–30)
WBC OTHER # BLD: 6.3 K/UL (ref 3.5–11)

## 2025-01-31 PROCEDURE — 36415 COLL VENOUS BLD VENIPUNCTURE: CPT

## 2025-01-31 PROCEDURE — 80053 COMPREHEN METABOLIC PANEL: CPT

## 2025-01-31 PROCEDURE — 80061 LIPID PANEL: CPT

## 2025-01-31 PROCEDURE — 85610 PROTHROMBIN TIME: CPT

## 2025-01-31 PROCEDURE — 83036 HEMOGLOBIN GLYCOSYLATED A1C: CPT

## 2025-01-31 PROCEDURE — 82306 VITAMIN D 25 HYDROXY: CPT

## 2025-01-31 PROCEDURE — 85025 COMPLETE CBC W/AUTO DIFF WBC: CPT

## 2025-01-31 PROCEDURE — 99211 OFF/OP EST MAY X REQ PHY/QHP: CPT

## 2025-01-31 NOTE — PROGRESS NOTES
Medication Management Service, Warfarin Management  [facility] Medication Management, [phone]  Visit Date: 25  Subjective:   Cheyanne Atkinson is a 54 y.o. female who presents to clinic today for anticoagulation monitoring and adjustment.     Patient was referred for warfarin management due to  Indication:   DVT.   INR goal: of 2.0-3.0.  Duration of therapy: indefinite.     Patient reports the following:   Adherent with regimen:  Yes  Missed or extra doses:  None   Bleeding or thromboembolic side effects:  None     Significant medication, dietary, alcohol, or tobacco changes:   No     Significant recent illness, disease state changes, or hospitalization:  No     Upcoming surgeries or procedures:  None           Assessment and PLAN   PT/INR done in office per protocol.     INR today is 2.1, therapeutic.     Plan:  Instructed the patient to continue the current warfarin regimen of 5 mg Mon, Fri, and 2.5 mg on all other days.  Using warfarin 5 mg tablets.     Recheck INR in 5 week(s).      Patient verbalized understanding of dosing directions and information discussed. Dosing schedule given to patient. Progress note sent to referring office.  Patient acknowledges working in consult agreement with pharmacist as referred by his/her physician.       Electronically signed by Manolo Cameron [facility] on  25 at 11:44 AM EST     For Pharmacy Admin Tracking Only     Intervention Detail: Adherence Monitorin  Total # of Interventions Recommended: 0  Total # of Interventions Accepted: 0  Time Spent (min): 20

## 2025-02-05 ENCOUNTER — TELEPHONE (OUTPATIENT)
Dept: PRIMARY CARE CLINIC | Age: 55
End: 2025-02-05

## 2025-02-05 DIAGNOSIS — M72.2 PLANTAR FASCIITIS: Primary | ICD-10-CM

## 2025-02-05 NOTE — TELEPHONE ENCOUNTER
Patient stopped into office stating her and PCP recently discussed podiatry referral. Patient would like PCP to place referral.

## 2025-02-06 NOTE — RESULT ENCOUNTER NOTE
Cholesterol is elevated, is pt taking her rosuvastatin daily? If she is we will need to increase it. A1c 6.1- continue to work on cutting back sugary foods.

## 2025-02-10 DIAGNOSIS — E78.2 MIXED HYPERLIPIDEMIA: ICD-10-CM

## 2025-02-10 RX ORDER — ROSUVASTATIN CALCIUM 40 MG/1
40 TABLET, COATED ORAL DAILY
Qty: 90 TABLET | Refills: 1 | Status: SHIPPED | OUTPATIENT
Start: 2025-02-10

## 2025-02-13 DIAGNOSIS — E55.9 VITAMIN D DEFICIENCY: ICD-10-CM

## 2025-02-13 DIAGNOSIS — I50.22 CHRONIC SYSTOLIC CONGESTIVE HEART FAILURE (HCC): ICD-10-CM

## 2025-02-14 RX ORDER — ERGOCALCIFEROL 1.25 MG/1
50000 CAPSULE, LIQUID FILLED ORAL WEEKLY
Qty: 12 CAPSULE | Refills: 0 | Status: SHIPPED | OUTPATIENT
Start: 2025-02-14

## 2025-02-14 RX ORDER — METOPROLOL SUCCINATE 25 MG/1
50 TABLET, EXTENDED RELEASE ORAL 2 TIMES DAILY
Qty: 120 TABLET | Refills: 0 | Status: SHIPPED | OUTPATIENT
Start: 2025-02-14

## 2025-03-05 ENCOUNTER — ANTI-COAG VISIT (OUTPATIENT)
Age: 55
End: 2025-03-05
Payer: COMMERCIAL

## 2025-03-05 DIAGNOSIS — Z86.718 HX OF DEEP VENOUS THROMBOSIS: Primary | ICD-10-CM

## 2025-03-05 LAB
INTERNATIONAL NORMALIZATION RATIO, POC: 2.2
PROTHROMBIN TIME, POC: 26.6

## 2025-03-05 PROCEDURE — 85610 PROTHROMBIN TIME: CPT

## 2025-03-05 PROCEDURE — 99211 OFF/OP EST MAY X REQ PHY/QHP: CPT

## 2025-03-05 NOTE — PROGRESS NOTES
Medication Management Service, Warfarin Management  [facility] Medication Management, [phone]  Visit Date:  3/5/2025  Subjective:   Cheyanne Atkinson is a 54 y.o. female who presents to clinic today for anticoagulation monitoring and adjustment.     Patient was referred for warfarin management due to  Indication:   DVT.   INR goal: of 2.0-3.0.  Duration of therapy: indefinite.     Patient reports the following:   Adherent with regimen:  Yes  Missed or extra doses:  None   Bleeding or thromboembolic side effects:  None     Significant medication, dietary, alcohol, or tobacco changes:   No     Significant recent illness, disease state changes, or hospitalization:  No     Upcoming surgeries or procedures:  None           Assessment and PLAN   PT/INR done in office per protocol.     INR today is 2.2, therapeutic.     Plan:  Will continue the current warfarin regimen of 5 mg Mon, Fri, only; 2.5 mg on all other days of the week.  Using warfarin 5 mg tablets.  CONSIDER CHANGE IN TABLET STRENGTH WITH NEXT NEW SCRIPT.      Recheck INR in 8 week(s).      Patient verbalized understanding of dosing directions and information discussed. Dosing schedule given to patient. Progress note sent to referring office.      Eva Charles RP, CACP  Clinical Pharmacist Medication Management  3/5/2025  12:10 PM      For Pharmacy Admin Tracking Only    Intervention Detail: Adherence Monitorin  Total # of Interventions Recommended: 1  Total # of Interventions Accepted: 1  Time Spent (min): 15

## 2025-03-06 DIAGNOSIS — K21.9 GASTROESOPHAGEAL REFLUX DISEASE WITHOUT ESOPHAGITIS: ICD-10-CM

## 2025-03-07 DIAGNOSIS — I50.22 CHRONIC SYSTOLIC CONGESTIVE HEART FAILURE (HCC): ICD-10-CM

## 2025-03-07 RX ORDER — OMEPRAZOLE 20 MG/1
20 CAPSULE, DELAYED RELEASE ORAL DAILY
Qty: 90 CAPSULE | Refills: 0 | Status: SHIPPED | OUTPATIENT
Start: 2025-03-07

## 2025-03-08 RX ORDER — METOPROLOL SUCCINATE 25 MG/1
50 TABLET, EXTENDED RELEASE ORAL 2 TIMES DAILY
Qty: 120 TABLET | Refills: 0 | Status: SHIPPED | OUTPATIENT
Start: 2025-03-08

## 2025-03-13 ENCOUNTER — OFFICE VISIT (OUTPATIENT)
Dept: GASTROENTEROLOGY | Age: 55
End: 2025-03-13

## 2025-03-13 VITALS
SYSTOLIC BLOOD PRESSURE: 126 MMHG | BODY MASS INDEX: 37.26 KG/M2 | TEMPERATURE: 97.3 F | WEIGHT: 243 LBS | DIASTOLIC BLOOD PRESSURE: 86 MMHG

## 2025-03-13 DIAGNOSIS — K44.9 HIATAL HERNIA: ICD-10-CM

## 2025-03-13 DIAGNOSIS — K22.2 SCHATZKI RING OF DISTAL ESOPHAGUS: ICD-10-CM

## 2025-03-13 DIAGNOSIS — Z80.0 FAMILY HISTORY OF COLON CANCER: Primary | ICD-10-CM

## 2025-03-13 DIAGNOSIS — K21.9 GASTROESOPHAGEAL REFLUX DISEASE, UNSPECIFIED WHETHER ESOPHAGITIS PRESENT: Primary | ICD-10-CM

## 2025-03-13 DIAGNOSIS — Z80.0 FAMILY HISTORY OF COLON CANCER: ICD-10-CM

## 2025-03-13 ASSESSMENT — ENCOUNTER SYMPTOMS
RECTAL PAIN: 0
VOMITING: 0
NAUSEA: 0
SHORTNESS OF BREATH: 0
BLOOD IN STOOL: 0
CONSTIPATION: 0
CHOKING: 0
ABDOMINAL PAIN: 0
DIARRHEA: 0
WHEEZING: 0
ABDOMINAL DISTENTION: 0
ANAL BLEEDING: 0
VOICE CHANGE: 0
COUGH: 0
SORE THROAT: 0
TROUBLE SWALLOWING: 0

## 2025-03-13 NOTE — PROGRESS NOTES
reflux symptoms. Routine exercise was streesed.     Avoidance of Caffeine, nicotine and chocolate were explained. Pt was asked to avoid spices grease and fried food. Advices were also given about avoidance of any kind of fast foods, soda pops and high energy drinks.    Pt was advised to place two small block under the head end of the bed which may help with night time reflux. Was advised not to eat any thin at least 2-3 hrs before going to bed and walk especially after dinner    Pt has verbalized understanding and agreement to this plan.    Pt was discussed in detail about the possible side effects of proton pump inhibiter therapy.    She was explained about the possibility of calcium and magnesium malabsorption and was advised to start taking calcium supplements with Vit D. Some over the counter regimens were explained to patient. Some dietary advices were also given.    She has verbalized understanding and agreement to this.    Weight loss      Thank you for allowing me to participate in the care of Ms. Atkinson. For any further questions please do not hesitate to contact me.    I have reviewed and agree with the ROS entered by the MA/Nurse.     This note is created with the assistance of the speech recognition program.  While intending to generate a document that actually reflects the content of the visit, document can still have some errors including those of syntax and sound like substitutions which may escape proof reading.  Actual meaning can be extrapolated by contextual diversion.     Zulma Mcdonough MD, FACG  Board Certified in Gastroenterology and Internal Medicine  [facility] Gastroenterology  Office #: [phone]

## 2025-03-13 NOTE — TELEPHONE ENCOUNTER
Patient was seen in the office today.  Colon/EGD ordered and the patient will call back to schedule.  Needs to discuss dates given in order to obtain a ride.  Writer thanked for the help.    Procedure scheduled/Dr JENNA Mcdonough  Procedure: colon egd   Dx:   1. Gastroesophageal reflux disease, unspecified whether esophagitis present    2. Family history of colon cancer    3. Schatzki ring of distal esophagus    4. Hiatal hernia      Date:  Time:  Hospital: [facility]   Bowel Prep instructions given:  In office/via phone:   Clearance needed: yes  Sees Dr Hollingsworth -cardiology  GLP - 1: N/A    Uses [facility] in [facility]

## 2025-03-14 ENCOUNTER — PREP FOR PROCEDURE (OUTPATIENT)
Dept: GASTROENTEROLOGY | Age: 55
End: 2025-03-14

## 2025-03-14 DIAGNOSIS — Z80.0 FAMILY HX OF COLON CANCER: ICD-10-CM

## 2025-03-14 PROBLEM — K21.9 GERD (GASTROESOPHAGEAL REFLUX DISEASE): Status: ACTIVE | Noted: 2025-03-14

## 2025-03-14 NOTE — TELEPHONE ENCOUNTER
Procedure scheduled/Dr JENNA Mcdonough  Procedure: colon/egd   Dx: gerd, family hx of colon cancer, Schatzki ring of distal esophagus, hiatal hernia   Date: 9/10/25   Time: 8am arrival 630am   Hospital: [facility]  Bowel Prep instructions given: miralax/dulc   In office/via phone: phone   Clearance needed: Dr Hollingsworth/cardiology  GLP - 1: none

## 2025-03-15 RX ORDER — POLYETHYLENE GLYCOL 3350 17 G/17G
POWDER, FOR SOLUTION ORAL
Qty: 238 G | Refills: 0 | Status: SHIPPED | OUTPATIENT
Start: 2025-03-15

## 2025-03-15 RX ORDER — BISACODYL 5 MG/1
TABLET, DELAYED RELEASE ORAL
Qty: 4 TABLET | Refills: 0 | Status: SHIPPED | OUTPATIENT
Start: 2025-03-15

## 2025-03-18 ENCOUNTER — HOSPITAL ENCOUNTER (OUTPATIENT)
Dept: MAMMOGRAPHY | Age: 55
Discharge: HOME | End: 2025-03-20
Payer: COMMERCIAL

## 2025-03-18 VITALS — WEIGHT: 243 LBS | BODY MASS INDEX: 37.26 KG/M2

## 2025-03-18 DIAGNOSIS — Z12.31 OTHER SCREENING MAMMOGRAM: ICD-10-CM

## 2025-03-18 PROCEDURE — 77063 BREAST TOMOSYNTHESIS BI: CPT

## 2025-03-21 ENCOUNTER — RESULTS FOLLOW-UP (OUTPATIENT)
Dept: MAMMOGRAPHY | Age: 55
End: 2025-03-21

## 2025-04-08 ENCOUNTER — OFFICE VISIT (OUTPATIENT)
Dept: PODIATRY | Age: 55
End: 2025-04-08
Payer: COMMERCIAL

## 2025-04-08 VITALS — WEIGHT: 243 LBS | HEIGHT: 67 IN | BODY MASS INDEX: 38.14 KG/M2

## 2025-04-08 DIAGNOSIS — M79.605 PAIN IN BOTH LOWER EXTREMITIES: ICD-10-CM

## 2025-04-08 DIAGNOSIS — M72.2 PLANTAR FASCIITIS: Primary | ICD-10-CM

## 2025-04-08 DIAGNOSIS — M79.604 PAIN IN BOTH LOWER EXTREMITIES: ICD-10-CM

## 2025-04-08 PROCEDURE — G8417 CALC BMI ABV UP PARAM F/U: HCPCS | Performed by: PODIATRIST

## 2025-04-08 PROCEDURE — 3017F COLORECTAL CA SCREEN DOC REV: CPT | Performed by: PODIATRIST

## 2025-04-08 PROCEDURE — G8427 DOCREV CUR MEDS BY ELIG CLIN: HCPCS | Performed by: PODIATRIST

## 2025-04-08 PROCEDURE — 1036F TOBACCO NON-USER: CPT | Performed by: PODIATRIST

## 2025-04-08 PROCEDURE — 99203 OFFICE O/P NEW LOW 30 MIN: CPT | Performed by: PODIATRIST

## 2025-04-08 NOTE — PROGRESS NOTES
[facility] PHYSICIANS [facility] PODIATRY  [address] [place], [place] [address]  Dept: [phone]    NEW PATIENT PROGRESS NOTE  Date of patient's visit: 4/8/2025  Patient's Name:  Cheyanne Atkinson YOB: 1970            Patient Care Team:  Noreen Beach DO as PCP - General (Family Medicine)  Noreen Beach DO as PCP - Empaneled Provider  Zulma Mcdonough MD as Consulting Physician (Gastroenterology)        Chief Complaint   Patient presents with    New Patient     Establish care    Foot Pain     Bl feet right foot more severe x several months         HPI:   Cheyanne Atkinson is a 54 y.o. female who presents to the office today complaining of bilateral foot pain with the right foot being more severe.  Symptoms began several month(s) ago. Patient relates pain is Present to bilateral heel.  Pain is rated 1 out of 10 and is described as intermittent.  Treatments prior to today's visit include: over the counter arch support.  Currently denies F/C/N/V. Pt's primary care physician is Noreen Beach DO last seen January 13 2025     No Known Allergies    Past Medical History:   Diagnosis Date    Blood circulation, collateral     CAD (coronary artery disease)     CHF (congestive heart failure) (HCC)     Diabetes mellitus (HCC)     GERD (gastroesophageal reflux disease)     Hx of blood clots     Bilateral legs    Hyperlipidemia     Hypertension     Prediabetes        Prior to Admission medications    Medication Sig Start Date End Date Taking? Authorizing Provider   polyethylene glycol (GLYCOLAX) 17 GM/SCOOP powder Please follow instructions provided to you by your provider. 3/15/25  Yes Zulma Mcdonough MD   bisacodyl 5 MG EC tablet Please follow instructions given to you by your provider. 3/15/25  Yes Zulma Mcdonough MD   metoprolol succinate (TOPROL XL) 25 MG extended release tablet Take 2 tablets by mouth twice daily 3/8/25  Yes Yong

## 2025-04-16 ENCOUNTER — OFFICE VISIT (OUTPATIENT)
Dept: FAMILY MEDICINE CLINIC | Age: 55
End: 2025-04-16
Payer: COMMERCIAL

## 2025-04-16 VITALS
OXYGEN SATURATION: 98 % | TEMPERATURE: 97.9 F | SYSTOLIC BLOOD PRESSURE: 132 MMHG | BODY MASS INDEX: 38.69 KG/M2 | DIASTOLIC BLOOD PRESSURE: 86 MMHG | RESPIRATION RATE: 16 BRPM | WEIGHT: 247 LBS | HEART RATE: 65 BPM

## 2025-04-16 DIAGNOSIS — H66.90 ACUTE OTITIS MEDIA, UNSPECIFIED OTITIS MEDIA TYPE: Primary | ICD-10-CM

## 2025-04-16 PROCEDURE — G8427 DOCREV CUR MEDS BY ELIG CLIN: HCPCS | Performed by: NURSE PRACTITIONER

## 2025-04-16 PROCEDURE — 3017F COLORECTAL CA SCREEN DOC REV: CPT | Performed by: NURSE PRACTITIONER

## 2025-04-16 PROCEDURE — 3075F SYST BP GE 130 - 139MM HG: CPT | Performed by: NURSE PRACTITIONER

## 2025-04-16 PROCEDURE — 3079F DIAST BP 80-89 MM HG: CPT | Performed by: NURSE PRACTITIONER

## 2025-04-16 PROCEDURE — 1036F TOBACCO NON-USER: CPT | Performed by: NURSE PRACTITIONER

## 2025-04-16 PROCEDURE — 99213 OFFICE O/P EST LOW 20 MIN: CPT | Performed by: NURSE PRACTITIONER

## 2025-04-16 PROCEDURE — G8417 CALC BMI ABV UP PARAM F/U: HCPCS | Performed by: NURSE PRACTITIONER

## 2025-04-16 RX ORDER — FLUTICASONE PROPIONATE 50 MCG
1 SPRAY, SUSPENSION (ML) NASAL DAILY
Qty: 32 G | Refills: 1 | Status: SHIPPED | OUTPATIENT
Start: 2025-04-16

## 2025-04-16 ASSESSMENT — ENCOUNTER SYMPTOMS
ABDOMINAL PAIN: 0
SORE THROAT: 0
RHINORRHEA: 0
WHEEZING: 0
TROUBLE SWALLOWING: 0
COUGH: 0
NAUSEA: 0
VOMITING: 0
SHORTNESS OF BREATH: 0

## 2025-04-16 NOTE — PROGRESS NOTES
[facility]  [address]  [address]  [facility] [address]    Cheyanne Atkinson is a 54 y.o. female who presents today for her medical conditions/complaints of Ear Fullness (X 2-3 weeks. Bilateral - sx greater in the R ear. Clear drainage. )          HPI:     /86   Pulse 65   Temp 97.9 °F (36.6 °C)   Resp 16   Wt 112 kg (247 lb)   SpO2 98%   BMI 38.69 kg/m²       Ear Fullness   There is pain in both ears. This is a new problem. The current episode started in the past 7 days. The problem occurs constantly. The problem has been gradually worsening. There has been no fever. The pain is moderate. Pertinent negatives include no abdominal pain, coughing, headaches, rash, rhinorrhea, sore throat or vomiting. She has tried nothing for the symptoms. The treatment provided no relief.       Past Medical History:   Diagnosis Date    Blood circulation, collateral     CAD (coronary artery disease)     CHF (congestive heart failure) (HCC)     Diabetes mellitus (HCC)     GERD (gastroesophageal reflux disease)     Hx of blood clots     Bilateral legs    Hyperlipidemia     Hypertension     Prediabetes         Past Surgical History:   Procedure Laterality Date    CARDIAC DEFIBRILLATOR PLACEMENT  05/03/2021    CORONARY ARTERY BYPASS GRAFT  2004    ENDOSCOPY, COLON, DIAGNOSTIC      EYE SURGERY  1990    Eye Socket    FRACTURE SURGERY      Mina and pins in R. collar bone    PACEMAKER PLACEMENT      SHOULDER SURGERY Right 1994    rods and pins placed    TONSILLECTOMY      UPPER GASTROINTESTINAL ENDOSCOPY N/A 8/16/2023    EGD BIOPSY performed by Zulma Mcdonough MD at [facility] OR       Family History   Problem Relation Age of Onset    Heart Attack Mother     Heart Disease Father     Colon Cancer Brother     Breast Cancer Neg Hx        Social History     Tobacco Use    Smoking status: Former     Current packs/day: 0.00     Average packs/day: 1 pack/day for 25.0 years (25.0 ttl pk-yrs)     Types:

## 2025-04-18 ENCOUNTER — HOSPITAL ENCOUNTER (EMERGENCY)
Age: 55
Discharge: HOME | End: 2025-04-18
Attending: STUDENT IN AN ORGANIZED HEALTH CARE EDUCATION/TRAINING PROGRAM
Payer: COMMERCIAL

## 2025-04-18 ENCOUNTER — APPOINTMENT (OUTPATIENT)
Dept: GENERAL RADIOLOGY | Age: 55
End: 2025-04-18
Payer: COMMERCIAL

## 2025-04-18 VITALS
SYSTOLIC BLOOD PRESSURE: 164 MMHG | OXYGEN SATURATION: 97 % | HEIGHT: 68 IN | BODY MASS INDEX: 37.44 KG/M2 | TEMPERATURE: 98.2 F | DIASTOLIC BLOOD PRESSURE: 93 MMHG | RESPIRATION RATE: 20 BRPM | HEART RATE: 89 BPM | WEIGHT: 247 LBS

## 2025-04-18 DIAGNOSIS — S61.451A CAT BITE OF HAND, RIGHT, INITIAL ENCOUNTER: Primary | ICD-10-CM
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
## 2025-04-18 PROCEDURE — 90471 IMMUNIZATION ADMIN: CPT | Performed by: STUDENT IN AN ORGANIZED HEALTH CARE EDUCATION/TRAINING PROGRAM

## 2025-04-18 PROCEDURE — 73130 X-RAY EXAM OF HAND: CPT

## 2025-04-18 PROCEDURE — 90715 TDAP VACCINE 7 YRS/> IM: CPT | Performed by: STUDENT IN AN ORGANIZED HEALTH CARE EDUCATION/TRAINING PROGRAM

## 2025-04-18 PROCEDURE — 99284 EMERGENCY DEPT VISIT MOD MDM: CPT

## 2025-04-18 PROCEDURE — 6360000002 HC RX W HCPCS: Performed by: STUDENT IN AN ORGANIZED HEALTH CARE EDUCATION/TRAINING PROGRAM

## 2025-04-18 RX ADMIN — TETANUS TOXOID, REDUCED DIPHTHERIA TOXOID AND ACELLULAR PERTUSSIS VACCINE, ADSORBED 0.5 ML: 5; 2.5; 8; 8; 2.5 SUSPENSION INTRAMUSCULAR at 13:48

## 2025-04-18 ASSESSMENT — PAIN SCALES - GENERAL: PAINLEVEL_OUTOF10: 2

## 2025-04-18 ASSESSMENT — PAIN - FUNCTIONAL ASSESSMENT: PAIN_FUNCTIONAL_ASSESSMENT: 0-10

## 2025-04-18 ASSESSMENT — LIFESTYLE VARIABLES
HOW MANY STANDARD DRINKS CONTAINING ALCOHOL DO YOU HAVE ON A TYPICAL DAY: PATIENT DOES NOT DRINK
HOW OFTEN DO YOU HAVE A DRINK CONTAINING ALCOHOL: NEVER

## 2025-04-18 NOTE — ED PROVIDER NOTES
[facility] Emergency Department  [address]  [facility] [address]  Phone: [phone]  Fax: [phone]        [facility] EMERGENCY DEPARTMENT  EMERGENCY DEPARTMENT ENCOUNTER      Pt Name: Cheyanne Atkinson  MRN: [id number]  Birthdate 1970  Date of evaluation: 4/18/2025  Provider: Didi Molina DO    CHIEF COMPLAINT       Chief Complaint   Patient presents with    Animal Bite     Was giving  the cat a bath and bit her right thumb, right hand swollen, scratches on chest. Happened last night. Patient on augmentin for ears and stared that last night       HISTORY OF PRESENT ILLNESS   (Location/Symptom, Timing/Onset,Context/Setting, Quality, Duration, Modifying Factors, Severity)  Note limiting factors.     Cheyanne Atkinson is a 54 y.o. female who presents to the emergency department with concern for a cat bite.  Patient states she was giving her cat a bath yesterday and her cat bit and scratched her.  Bit her right hand, scratched her chest.  Right-hand-dominant.  Patient is unsure when the last time she had a tetanus shot was.  Patient states that she also was started on Augmentin yesterday for an ear infection, states she is on a 10-day course.  Patient was concerned about a possible tendon injury or getting very sick from the cat bite so wanted to be evaluated.  Patient states that she has had the cat since it has been a baby, states the cat is up-to-date on all shots except its rabies vaccines.    Nursing Notes were reviewed.    REVIEW OF SYSTEMS       Review of Systems   Constitutional:  Negative for chills and fever.   Musculoskeletal:  Positive for arthralgias.   Skin:  Positive for wound.       PAST MEDICAL HISTORY     Past Medical History:   Diagnosis Date    Blood circulation, collateral     CAD (coronary artery disease)     CHF (congestive heart failure) (HCC)     Diabetes mellitus (HCC)     GERD (gastroesophageal reflux disease)     Hx of blood clots      scratches over chest wall as well, deep scratch over right breast.  Discussed with patient that she already is on the appropriate antibiotic therapy with the Augmentin.  Reviewed dosing and this is appropriate to cover for the cat bite as well.  Will plan an x-ray imaging of hand to ensure no foreign bodies or fractures.  Will update tetanus.  Cat is patient's own pet no was a provoked bite, do not feel that she needs any rabies prophylaxis at this time.  Will monitor closely.  Anticipate discharge.    Amount and/or Complexity of Data Reviewed  Radiology: ordered. Decision-making details documented in ED Course.    Risk  Prescription drug management.        DIAGNOSTIC RESULTS     LABS:  Labs Reviewed - No data to display    All other labs were within normal range or not returned as of this dictation.    RADIOLOGY:  XR HAND RIGHT (MIN 3 VIEWS)   Final Result   Soft tissue swelling at the base of the 1st digit with a few radiopaque   densities projecting adjacent to the 1st metacarpal.             EKG:  None      ED COURSE     ED Course as of 04/18/25 1513   Fri Apr 18, 2025   1504 XR HAND RIGHT (MIN 3 VIEWS)  IMPRESSION:  Soft tissue swelling at the base of the 1st digit with a few radiopaque  densities projecting adjacent to the 1st metacarpal.   [AB]   1511 Patient reevaluated, updated on x-ray imaging thoroughly irrigated right hand with 1 L of saline.  Will plan on discharge at this time with close outpatient follow-up with primary doctor.  Provided with information for orthopedics for follow-up.  Emphasized importance of completing antibiotic course.  Given strict return precautions.  Patient expressed understanding, agreed with plan [AB]      ED Course User Index  [AB] Didi Molina DO       CONSULTS:  None    PROCEDURES:  Procedures    Critical Care:  None    FINAL IMPRESSION      1. Cat bite of hand, right, initial encounter          DISPOSITION/PLAN     DISPOSITION Decision To Discharge 04/18/2025

## 2025-04-18 NOTE — DISCHARGE INSTRUCTIONS
We evaluated you for your cat bite.  Ensure you complete your course of antibiotics, this will also help with your cat bite.    Follow close with your primary doctor.    Return to the emergency department if you develop any worsening or concerning symptoms.

## 2025-04-18 NOTE — ED NOTES
Wound is clean and dry.  Saline wipe for assessment.  Bacitracin ointment applied with large bandaid.

## 2025-04-22 ENCOUNTER — TELEPHONE (OUTPATIENT)
Age: 55
End: 2025-04-22

## 2025-04-22 DIAGNOSIS — E55.9 VITAMIN D DEFICIENCY: ICD-10-CM

## 2025-04-22 DIAGNOSIS — I50.22 CHRONIC SYSTOLIC CONGESTIVE HEART FAILURE (HCC): ICD-10-CM

## 2025-04-22 NOTE — TELEPHONE ENCOUNTER
[facility] Medication Management Clinic Note  Called patient to follow up post discharge from ED for cat bite. Patient was not prescribed any antibiotics in the ED because she was actually already taking one for ear infection. She started this 10-day course 4/17. Patient was scheduled for her regular INR check on 4/28. Advised moving appointment sooner for safety check of INR. Appointment rescheduled for 4/24.    Sailaja Villeda PharmD  [facility]  4/22/2025 12:39 PM

## 2025-04-23 RX ORDER — ERGOCALCIFEROL 1.25 MG/1
50000 CAPSULE, LIQUID FILLED ORAL WEEKLY
Qty: 12 CAPSULE | Refills: 1 | Status: SHIPPED | OUTPATIENT
Start: 2025-04-23

## 2025-04-23 RX ORDER — EMPAGLIFLOZIN 10 MG/1
10 TABLET, FILM COATED ORAL DAILY
Qty: 90 TABLET | Refills: 1 | Status: SHIPPED | OUTPATIENT
Start: 2025-04-23

## 2025-04-23 RX ORDER — METOPROLOL SUCCINATE 25 MG/1
50 TABLET, EXTENDED RELEASE ORAL 2 TIMES DAILY
Qty: 120 TABLET | Refills: 5 | Status: SHIPPED | OUTPATIENT
Start: 2025-04-23

## 2025-04-24 ENCOUNTER — ANTI-COAG VISIT (OUTPATIENT)
Age: 55
End: 2025-04-24
Payer: COMMERCIAL

## 2025-04-24 DIAGNOSIS — Z86.718 HX OF DEEP VENOUS THROMBOSIS: Primary | ICD-10-CM

## 2025-04-24 LAB
INTERNATIONAL NORMALIZATION RATIO, POC: 2.5
PROTHROMBIN TIME, POC: 29.9

## 2025-04-24 PROCEDURE — 99213 OFFICE O/P EST LOW 20 MIN: CPT

## 2025-04-24 PROCEDURE — 85610 PROTHROMBIN TIME: CPT

## 2025-04-24 RX ORDER — WARFARIN SODIUM 2.5 MG/1
TABLET ORAL
Qty: 135 TABLET | Refills: 2 | Status: SHIPPED | OUTPATIENT
Start: 2025-04-24

## 2025-04-24 NOTE — PROGRESS NOTES
Medication Management Service, Warfarin Management  [facility] Medication Management, [phone]  Visit Date: 4/24/2025   Subjective:   Cheyanne Atkinson is a 54 y.o. female who presents to clinic today for anticoagulation monitoring and adjustment.    Patient was referred for warfarin management due to  Indication:   DVT.   INR goal: of 2.0-3.0.  Duration of therapy: indefinite.    Patient reports the following:   Adherent with regimen:  Yes  Missed or extra doses:  None   Bleeding or thromboembolic side effects:  None    Significant medication, dietary, alcohol, or tobacco changes:  Seen 4/16 at urgent care for ear infection - prescribed Augmentin x 10 days and Flonase nasal spray. Seen in ED 4/18 for cat bite, given tetanus shot, no additional antibiotics ordered.    Significant recent illness, disease state changes, or hospitalization:   noted above, patient is improved now  Upcoming surgeries or procedures:  None mentioned by patient - noted EGD biopsy on chart scheduled for 9/10/25, will discuss with patient in upcoming appointment as this is ~ 5 months away           Assessment and PLAN   PT/INR done in office per protocol.     INR today is 2.5, therapeutic.    Plan:  Instructed the patient to continue the current warfarin regimen of 5 mg Mon, Fri, and 2.5 mg on all other days.  Using warfarin 2.5 mg tablets per patient request. New order for warfarin 2.5 mg tablets (was using 5 mg tablets) sent to patient's pharmacy.    Recheck INR in 6 week(s).   Patient was scheduled 8 weeks out by George Velasquez, who saw her at her last visit. I discussed with patient that our typical clinic policy is no more than 6 weeks between appointments for INR check. Patient agreeable to 6 week appointment.    Patient verbalized understanding of dosing directions and information discussed. Dosing schedule given to patient. Progress note sent to referring office.  Patient acknowledges working in consult agreement

## 2025-04-28 DIAGNOSIS — Z86.718 HX OF DEEP VENOUS THROMBOSIS: Primary | ICD-10-CM

## 2025-06-09 ENCOUNTER — ANTI-COAG VISIT (OUTPATIENT)
Age: 55
End: 2025-06-09
Payer: COMMERCIAL

## 2025-06-09 DIAGNOSIS — Z86.718 HX OF DEEP VENOUS THROMBOSIS: Primary | ICD-10-CM

## 2025-06-09 LAB
INTERNATIONAL NORMALIZATION RATIO, POC: 1.9
PROTHROMBIN TIME, POC: 22.3

## 2025-06-09 PROCEDURE — 85610 PROTHROMBIN TIME: CPT

## 2025-06-09 PROCEDURE — 99211 OFF/OP EST MAY X REQ PHY/QHP: CPT

## 2025-06-09 NOTE — PROGRESS NOTES
Medication Management Service, Warfarin Management  [facility] Medication Management, [phone]  Visit Date: 2025   Subjective:   Cheyanne Atkinson is a 54 y.o. female who presents to clinic today for anticoagulation monitoring and adjustment.    Patient was referred for warfarin management due to  Indication:   DVT.   INR goal: of 2.0-3.0.  Duration of therapy: indefinite.    Patient reports the following:   Adherent with regimen:  mostly    Missed or extra doses:  missed dose of warfarin , states she took it the next day but then can't recall if she took that day's dose or not, was out of her routine with sister visiting from [place]    Bleeding or thromboembolic side effects:  None  Significant medication, dietary, alcohol, or tobacco changes:  None  Significant recent illness, disease state changes, or hospitalization:  None    Upcoming surgeries or procedures:   GI procedure in September, awaiting cardiac clearance for shakeel-procedural plans           Assessment and PLAN   PT/INR done in office per protocol.     INR today is 1.9, subtherapeutic in setting of recent missed dose    Plan:  Instructed the patient to continue the current warfarin regimen of 5 mg Mon, Fri, and 2.5 mg on all other days.  Using warfarin 5 mg tablets.    Recheck INR in 2 week(s).     Patient verbalized understanding of dosing directions and information discussed. Dosing schedule given to patient. Progress note sent to referring office.  Patient acknowledges working in consult agreement with pharmacist as referred by his/her physician.      Electronically signed by Sailaja Bonilla RPH on 25 at 8:00 AM EDT    For Pharmacy Admin Tracking Only    Intervention Detail: Adherence Monitorin  Total # of Interventions Recommended: 1  Total # of Interventions Accepted: 1  Time Spent (min): 20

## 2025-06-16 DIAGNOSIS — K21.9 GASTROESOPHAGEAL REFLUX DISEASE WITHOUT ESOPHAGITIS: ICD-10-CM

## 2025-06-17 RX ORDER — OMEPRAZOLE 20 MG/1
20 CAPSULE, DELAYED RELEASE ORAL DAILY
Qty: 90 CAPSULE | Refills: 1 | Status: SHIPPED | OUTPATIENT
Start: 2025-06-17

## 2025-06-17 RX ORDER — POTASSIUM CHLORIDE 1500 MG/1
20 TABLET, EXTENDED RELEASE ORAL DAILY
Qty: 90 TABLET | Refills: 1 | Status: SHIPPED | OUTPATIENT
Start: 2025-06-17

## 2025-06-26 ENCOUNTER — ANTI-COAG VISIT (OUTPATIENT)
Age: 55
End: 2025-06-26
Payer: COMMERCIAL

## 2025-06-26 DIAGNOSIS — Z86.718 HX OF DEEP VENOUS THROMBOSIS: Primary | ICD-10-CM

## 2025-06-26 LAB
INTERNATIONAL NORMALIZATION RATIO, POC: 1.5
PROTHROMBIN TIME, POC: 17.4

## 2025-06-26 PROCEDURE — 85610 PROTHROMBIN TIME: CPT

## 2025-06-26 PROCEDURE — 99213 OFFICE O/P EST LOW 20 MIN: CPT

## 2025-06-26 NOTE — PROGRESS NOTES
Medication Management Service, Warfarin Management  [facility] Medication Management, [phone]  Visit Date: 2025   Subjective:   Cheyanne Atkinson is a 54 y.o. female who presents to clinic today for anticoagulation monitoring and adjustment.    Patient was referred for warfarin management due to  Indication:   DVT.   INR goal: of 2.0-3.0.  Duration of therapy: indefinite.    Patient reports the following:   Adherent with regimen:  mostly  Missed or extra doses:  patient missed warfarin dose , she did make up for it by taking 5 mg instead of 2.5 mg on      Bleeding or thromboembolic side effects:  None  Significant medication, dietary, alcohol, or tobacco changes:  no recent changes in diet or medications, will likely have 2 drinks this upcoming weekend    Significant recent illness, disease state changes, or hospitalization:  None    Upcoming surgeries or procedures:  GI procedure in September, awaiting cardiac clearance for shakeel-procedural plans            Assessment and PLAN   PT/INR done in office per protocol.     INR today is 1.5, subtherapeutic, likely secondary to missed dose 2 days ago    Plan:  Take increased dose of warfarin 5 mg today, then resume maintenance dose of warfarin 5 mg on  and  and 2.5 mg all other days.  Using warfarin 2.5 mg and 5 mg tablets.    Recheck INR in 1.5 week(s).     Patient verbalized understanding of dosing directions and information discussed. Dosing schedule given to patient. Progress note sent to referring office.  Patient acknowledges working in consult agreement with pharmacist as referred by his/her physician.      Electronically signed by Sailaja Bonilla RPH on 25 at 9:48 AM EDT    For Pharmacy Admin Tracking Only    Intervention Detail: Adherence Monitorin and Dose Adjustment: 1, reason: Therapy Optimization  Total # of Interventions Recommended: 2  Total # of Interventions Accepted: 2  Time Spent (min):

## 2025-07-08 ENCOUNTER — ANTI-COAG VISIT (OUTPATIENT)
Age: 55
End: 2025-07-08
Payer: COMMERCIAL

## 2025-07-08 DIAGNOSIS — Z86.718 HX OF DEEP VENOUS THROMBOSIS: Primary | ICD-10-CM

## 2025-07-08 LAB
INTERNATIONAL NORMALIZATION RATIO, POC: 2.9
PROTHROMBIN TIME, POC: 34.9

## 2025-07-08 PROCEDURE — 99211 OFF/OP EST MAY X REQ PHY/QHP: CPT

## 2025-07-08 PROCEDURE — 85610 PROTHROMBIN TIME: CPT

## 2025-07-08 NOTE — PROGRESS NOTES
Medication Management Service, Warfarin Management  [facility] Medication Management, [phone]  Visit Date: 2025   Subjective:   Cheyanne Atkinson is a 55 y.o. female who presents to clinic today for anticoagulation monitoring and adjustment.    Patient was referred for warfarin management due to  Indication:   DVT.   INR goal: of 2.0-3.0.  Duration of therapy: indefinite.    Patient reports the following:   Adherent with regimen:  Yes  Missed or extra doses:  None   Bleeding or thromboembolic side effects:  None  Significant medication, dietary, alcohol, or tobacco changes:  None  Significant recent illness, disease state changes, or hospitalization:  None    Upcoming surgeries or procedures:  EGD/biopsy 9/10, sees cardiology . Has bridged with Lovenox in the past prior to procedures although historically she has chosen not to bridge after procedure. Advised to discuss with cardiology at her August appointment and we will make shakeel-procedural plan when needed.           Assessment and PLAN   PT/INR done in office per protocol.     INR today is 2.9, therapeutic.    Plan:  Instructed the patient to continue the current warfarin regimen of 5 mg Mon, Fri, and 2.5 mg on all other days.  Using warfarin 2.5 mg tablets.    Recheck INR in 4 week(s).     Patient verbalized understanding of dosing directions and information discussed. Dosing schedule given to patient. Progress note sent to referring office.  Patient acknowledges working in consult agreement with pharmacist as referred by his/her physician.      Electronically signed by Sailaja Bonilla RPH on 25 at 9:11 AM EDT    For Pharmacy Admin Tracking Only    Intervention Detail: Adherence Monitorin  Total # of Interventions Recommended: 1  Total # of Interventions Accepted: 1  Time Spent (min): 20

## 2025-07-15 ENCOUNTER — OFFICE VISIT (OUTPATIENT)
Dept: PRIMARY CARE CLINIC | Age: 55
End: 2025-07-15
Payer: COMMERCIAL

## 2025-07-15 VITALS
BODY MASS INDEX: 37.25 KG/M2 | DIASTOLIC BLOOD PRESSURE: 80 MMHG | SYSTOLIC BLOOD PRESSURE: 118 MMHG | OXYGEN SATURATION: 94 % | WEIGHT: 245 LBS | HEART RATE: 74 BPM

## 2025-07-15 DIAGNOSIS — E78.5 HYPERLIPIDEMIA, UNSPECIFIED HYPERLIPIDEMIA TYPE: ICD-10-CM

## 2025-07-15 DIAGNOSIS — K21.9 GASTROESOPHAGEAL REFLUX DISEASE WITHOUT ESOPHAGITIS: Primary | ICD-10-CM

## 2025-07-15 DIAGNOSIS — L73.9 FOLLICULITIS: ICD-10-CM

## 2025-07-15 DIAGNOSIS — R73.03 PREDIABETES: ICD-10-CM

## 2025-07-15 PROCEDURE — 3079F DIAST BP 80-89 MM HG: CPT | Performed by: FAMILY MEDICINE

## 2025-07-15 PROCEDURE — 3074F SYST BP LT 130 MM HG: CPT | Performed by: FAMILY MEDICINE

## 2025-07-15 PROCEDURE — 3017F COLORECTAL CA SCREEN DOC REV: CPT | Performed by: FAMILY MEDICINE

## 2025-07-15 PROCEDURE — G8417 CALC BMI ABV UP PARAM F/U: HCPCS | Performed by: FAMILY MEDICINE

## 2025-07-15 PROCEDURE — 1036F TOBACCO NON-USER: CPT | Performed by: FAMILY MEDICINE

## 2025-07-15 PROCEDURE — G8427 DOCREV CUR MEDS BY ELIG CLIN: HCPCS | Performed by: FAMILY MEDICINE

## 2025-07-15 PROCEDURE — 99214 OFFICE O/P EST MOD 30 MIN: CPT | Performed by: FAMILY MEDICINE

## 2025-07-15 RX ORDER — DOXYCYCLINE HYCLATE 100 MG
100 TABLET ORAL 2 TIMES DAILY
Qty: 14 TABLET | Refills: 0 | Status: SHIPPED | OUTPATIENT
Start: 2025-07-15 | End: 2025-07-22

## 2025-07-15 ASSESSMENT — ENCOUNTER SYMPTOMS: VOMITING: 0

## 2025-07-15 NOTE — PROGRESS NOTES
[facility]  [facility]  [address] [place] [address] [address]  [address]  [facility] [address]  Dept: [phone]  Dept Fax: [phone]    Cheyanne Atkinson is a 55 y.o. female who presentstoday for her medical conditions/complaints as noted below.  Cheyanne Atkinson is c/o of  Chief Complaint   Patient presents with    Mass     Lump upper leg/pelvic area, painful for a few weeks, black/blue, has a knot    Ear Pain     Left ear pain, may have fluid in ear    Health Maintenance     Pt states she has her colonoscopy coming up 9/10/25         HPI:     History of Present Illness  The patient presents for  6 month follow up    She is scheduled for a colonoscopy and EGD in September. She is also under the care of a cardiologist, with an upcoming appointment in August 2025 for a device check. She has been experiencing discomfort in her ear due to fluid accumulation, even though she regularly cleans it. She does not typically use Flonase. She had an ear infection in April 2025, which was treated with antibiotics.    She is currently on Coumadin and has previously undergone Lovenox bridging before procedures, which she found unpleasant due to the injections. She prefers to return to oral medication post-procedure. Discussed there is risk of blood clot and she is aware.     She has noticed a painful lump in her lower abdomen near the pelvic area, which she describes as feeling like a knot. The lump has not ruptured or formed a head, but it has increased in size over time. She first noticed the lump a few weeks ago when it was smaller and less painful. She attempted to squeeze it, but believes it is too deep.    She has been taking an increased dose of her cholesterol medication, now at 20 mg.        Hemoglobin A1C (%)   Date Value   01/31/2025 6.1 (H)   01/15/2024 6.0   12/28/2022 6.0             ( goal A1C is < 7)   No components found for: \"LABMICR\"  No components found for: \"LDLCHOLESTEROL\",

## 2025-07-18 DIAGNOSIS — E78.2 MIXED HYPERLIPIDEMIA: ICD-10-CM

## 2025-07-18 RX ORDER — ROSUVASTATIN CALCIUM 40 MG/1
40 TABLET, COATED ORAL DAILY
Qty: 90 TABLET | Refills: 1 | Status: SHIPPED | OUTPATIENT
Start: 2025-07-18

## 2025-07-30 ENCOUNTER — OFFICE VISIT (OUTPATIENT)
Dept: PODIATRY | Age: 55
End: 2025-07-30
Payer: COMMERCIAL

## 2025-07-30 VITALS — WEIGHT: 245 LBS | HEIGHT: 68 IN | BODY MASS INDEX: 37.13 KG/M2

## 2025-07-30 DIAGNOSIS — M79.604 PAIN IN BOTH LOWER EXTREMITIES: ICD-10-CM

## 2025-07-30 DIAGNOSIS — M72.2 PLANTAR FASCIITIS: Primary | ICD-10-CM

## 2025-07-30 DIAGNOSIS — M79.605 PAIN IN BOTH LOWER EXTREMITIES: ICD-10-CM

## 2025-07-30 PROCEDURE — G8417 CALC BMI ABV UP PARAM F/U: HCPCS | Performed by: PODIATRIST

## 2025-07-30 PROCEDURE — 99214 OFFICE O/P EST MOD 30 MIN: CPT | Performed by: PODIATRIST

## 2025-07-30 PROCEDURE — 20550 NJX 1 TENDON SHEATH/LIGAMENT: CPT | Performed by: PODIATRIST

## 2025-07-30 PROCEDURE — 1036F TOBACCO NON-USER: CPT | Performed by: PODIATRIST

## 2025-07-30 PROCEDURE — 3017F COLORECTAL CA SCREEN DOC REV: CPT | Performed by: PODIATRIST

## 2025-07-30 PROCEDURE — G8427 DOCREV CUR MEDS BY ELIG CLIN: HCPCS | Performed by: PODIATRIST

## 2025-08-05 ENCOUNTER — ANTI-COAG VISIT (OUTPATIENT)
Age: 55
End: 2025-08-05
Payer: COMMERCIAL

## 2025-08-05 DIAGNOSIS — Z86.718 HX OF DEEP VENOUS THROMBOSIS: Primary | ICD-10-CM

## 2025-08-05 LAB
INTERNATIONAL NORMALIZATION RATIO, POC: 2.2
PROTHROMBIN TIME, POC: 26.2

## 2025-08-05 PROCEDURE — 85610 PROTHROMBIN TIME: CPT

## 2025-08-05 PROCEDURE — 99212 OFFICE O/P EST SF 10 MIN: CPT

## 2025-08-05 RX ORDER — WARFARIN SODIUM 5 MG/1
TABLET ORAL
Qty: 90 TABLET | Refills: 3 | Status: SHIPPED | OUTPATIENT
Start: 2025-08-05

## 2025-08-05 RX ORDER — BETAMETHASONE SODIUM PHOSPHATE AND BETAMETHASONE ACETATE 3; 3 MG/ML; MG/ML
6 INJECTION, SUSPENSION INTRA-ARTICULAR; INTRALESIONAL; INTRAMUSCULAR; SOFT TISSUE ONCE
Status: COMPLETED | OUTPATIENT
Start: 2025-08-05 | End: 2025-08-07

## 2025-08-07 RX ADMIN — BETAMETHASONE SODIUM PHOSPHATE AND BETAMETHASONE ACETATE 6 MG: 3; 3 INJECTION, SUSPENSION INTRA-ARTICULAR; INTRALESIONAL; INTRAMUSCULAR; SOFT TISSUE at 09:38

## 2025-08-14 ENCOUNTER — TELEPHONE (OUTPATIENT)
Age: 55
End: 2025-08-14

## 2025-08-27 ENCOUNTER — OFFICE VISIT (OUTPATIENT)
Dept: PODIATRY | Age: 55
End: 2025-08-27
Payer: COMMERCIAL

## 2025-08-27 VITALS — WEIGHT: 249 LBS | BODY MASS INDEX: 37.74 KG/M2 | HEIGHT: 68 IN

## 2025-08-27 DIAGNOSIS — L60.0 INGROWN NAIL OF GREAT TOE OF RIGHT FOOT: ICD-10-CM

## 2025-08-27 DIAGNOSIS — M72.2 PLANTAR FASCIITIS: Primary | ICD-10-CM

## 2025-08-27 PROCEDURE — 99213 OFFICE O/P EST LOW 20 MIN: CPT | Performed by: PODIATRIST

## 2025-08-27 PROCEDURE — G8417 CALC BMI ABV UP PARAM F/U: HCPCS | Performed by: PODIATRIST

## 2025-08-27 PROCEDURE — G8427 DOCREV CUR MEDS BY ELIG CLIN: HCPCS | Performed by: PODIATRIST

## 2025-08-27 PROCEDURE — 3017F COLORECTAL CA SCREEN DOC REV: CPT | Performed by: PODIATRIST

## 2025-08-27 PROCEDURE — 1036F TOBACCO NON-USER: CPT | Performed by: PODIATRIST

## 2025-09-03 ENCOUNTER — ANTI-COAG VISIT (OUTPATIENT)
Age: 55
End: 2025-09-03
Payer: COMMERCIAL

## 2025-09-03 ENCOUNTER — TELEPHONE (OUTPATIENT)
Age: 55
End: 2025-09-03

## 2025-09-03 DIAGNOSIS — Z86.718 HX OF DEEP VENOUS THROMBOSIS: Primary | ICD-10-CM

## 2025-09-03 LAB
INTERNATIONAL NORMALIZATION RATIO, POC: 2.5
PROTHROMBIN TIME, POC: 30.1

## 2025-09-03 PROCEDURE — 99213 OFFICE O/P EST LOW 20 MIN: CPT

## 2025-09-03 PROCEDURE — 85610 PROTHROMBIN TIME: CPT

## 2025-09-03 RX ORDER — ENOXAPARIN SODIUM 150 MG/ML
120 INJECTION SUBCUTANEOUS EVERY 12 HOURS
Qty: 16 ML | Refills: 1 | Status: SHIPPED | OUTPATIENT
Start: 2025-09-03

## 2025-09-05 ENCOUNTER — COMMUNITY OUTREACH (OUTPATIENT)
Dept: PRIMARY CARE CLINIC | Age: 55
End: 2025-09-05

## (undated) DEVICE — FORCEPS BX L240CM JAW DIA2.8MM L CAP W/ NDL MIC MESH TOOTH

## (undated) DEVICE — GLOVE SURG 8 11.7IN BEAD CUF LIGHT BRN SENSICARE LTX FREE

## (undated) DEVICE — MEDICINE CUP, GRADUATED, STER: Brand: MEDLINE

## (undated) DEVICE — GAUZE,SPONGE,4"X4",16PLY,STRL,LF,10/TRAY: Brand: MEDLINE

## (undated) DEVICE — JELLY,LUBE,STERILE,FLIP TOP,TUBE,2-OZ: Brand: MEDLINE

## (undated) DEVICE — BITEBLOCK ENDOSCP 60FR MAXI WHT POLYETH STURDY W/ VELC WVN

## (undated) DEVICE — CO2 CANNULA,SUPERSOFT, ADLT,7'O2,7'CO2: Brand: MEDLINE

## (undated) DEVICE — ADAPTER TBNG LUER STUB 15 GA INTMED

## (undated) DEVICE — BASIN EMSIS 700ML GRAPHITE PLAS KID SHP GRAD